# Patient Record
Sex: FEMALE | Race: WHITE | NOT HISPANIC OR LATINO | Employment: FULL TIME | URBAN - METROPOLITAN AREA
[De-identification: names, ages, dates, MRNs, and addresses within clinical notes are randomized per-mention and may not be internally consistent; named-entity substitution may affect disease eponyms.]

---

## 2017-04-06 ENCOUNTER — PATIENT MESSAGE (OUTPATIENT)
Dept: NEUROLOGY | Facility: CLINIC | Age: 52
End: 2017-04-06

## 2017-04-07 RX ORDER — RASAGILINE 1 MG/1
1 TABLET ORAL DAILY
Qty: 90 TABLET | Refills: 3 | Status: SHIPPED | OUTPATIENT
Start: 2017-04-07 | End: 2017-04-10 | Stop reason: SDUPTHER

## 2017-04-07 NOTE — TELEPHONE ENCOUNTER
Patient returned  Call and said the pharmacy needs the Rx renewed before it can be refilled. i informed patient that staff would reorder Rx so that it may be signed. Patient verbally expressed understanding.

## 2017-04-10 ENCOUNTER — PATIENT MESSAGE (OUTPATIENT)
Dept: NEUROLOGY | Facility: CLINIC | Age: 52
End: 2017-04-10

## 2017-04-11 RX ORDER — RASAGILINE 1 MG/1
1 TABLET ORAL DAILY
Qty: 90 TABLET | Refills: 3 | Status: SHIPPED | OUTPATIENT
Start: 2017-04-11 | End: 2018-04-05 | Stop reason: SDUPTHER

## 2017-07-03 ENCOUNTER — TELEPHONE (OUTPATIENT)
Dept: NEUROLOGY | Facility: CLINIC | Age: 52
End: 2017-07-03

## 2017-07-03 NOTE — TELEPHONE ENCOUNTER
----- Message from Kim Hewitt sent at 7/3/2017 10:36 AM CDT -----  Contact: Patient [phone]  Patient is calling to schedule a follow up appt with Dr. Padgett or his assistant. Please call

## 2017-07-03 NOTE — TELEPHONE ENCOUNTER
Called and left a message for Ms. Navarro regarding an appt with . I stated that I scheduled her for the next spot in August which is 29 at 4:00PM

## 2017-07-13 DIAGNOSIS — G20.A1 PARKINSON DISEASE: ICD-10-CM

## 2017-07-13 RX ORDER — LEVODOPA AND CARBIDOPA 145; 36.25 MG/1; MG/1
CAPSULE, EXTENDED RELEASE ORAL
Qty: 270 CAPSULE | Refills: 1 | Status: SHIPPED | OUTPATIENT
Start: 2017-07-13 | End: 2021-08-20

## 2017-07-14 ENCOUNTER — PATIENT MESSAGE (OUTPATIENT)
Dept: NEUROLOGY | Facility: CLINIC | Age: 52
End: 2017-07-14

## 2017-07-24 ENCOUNTER — PATIENT MESSAGE (OUTPATIENT)
Dept: NEUROLOGY | Facility: CLINIC | Age: 52
End: 2017-07-24

## 2017-08-29 ENCOUNTER — OFFICE VISIT (OUTPATIENT)
Dept: NEUROLOGY | Facility: CLINIC | Age: 52
End: 2017-08-29
Payer: COMMERCIAL

## 2017-08-29 VITALS
DIASTOLIC BLOOD PRESSURE: 73 MMHG | BODY MASS INDEX: 21.18 KG/M2 | SYSTOLIC BLOOD PRESSURE: 117 MMHG | HEART RATE: 72 BPM | HEIGHT: 66 IN | WEIGHT: 131.81 LBS

## 2017-08-29 DIAGNOSIS — G20.A1 PD (PARKINSON'S DISEASE): Primary | Chronic | ICD-10-CM

## 2017-08-29 PROCEDURE — 99999 PR PBB SHADOW E&M-EST. PATIENT-LVL III: CPT | Mod: PBBFAC,,, | Performed by: PSYCHIATRY & NEUROLOGY

## 2017-08-29 PROCEDURE — 99215 OFFICE O/P EST HI 40 MIN: CPT | Mod: S$GLB,,, | Performed by: PSYCHIATRY & NEUROLOGY

## 2017-08-29 PROCEDURE — 3008F BODY MASS INDEX DOCD: CPT | Mod: S$GLB,,, | Performed by: PSYCHIATRY & NEUROLOGY

## 2017-08-29 NOTE — LETTER
September 5, 2017      Luis Manuel Freire MD  [address] [place] [address]           [facility]  [address]  [place] [address]  Phone: [phone]  Fax: [phone]          Patient: Tika Navarro   MR Number: [id number]   YOB: 1965   Date of Visit: 8/29/2017       Dear Dr. Luis Manuel Freire:    Thank you for referring Tika Navarro to me for evaluation. Attached you will find relevant portions of my assessment and plan of care.    If you have questions, please do not hesitate to call me. I look forward to following Tika Navarro along with you.    Sincerely,    Jorgito Davey  CC:  No Recipients    If you would like to receive this communication electronically, please contact [email or web address] or [phone] to request more information on [employer] Link access.    For providers and/or their staff who would like to refer a patient to [facility], please contact us through our one-stop-shop provider referral line, [facility], at [phone].    If you feel you have received this communication in error or would no longer like to receive these types of communications, please e-mail [email or web address]

## 2017-08-29 NOTE — PROGRESS NOTES
Name: Tika Navarro  MRN: [id number]   CSN: [id number]      Date: 08/29/2017    Referring physician:  Luis Manuel Freire MD  [address] [place] [address]    Chief Complaint:   - taking rytary 145 1 tab tid plus adds in cd/ld 25/100 1/4 tabs as needed  - doing well overall  - feels gait si a bit crooked  - still fairly brittle with dyskinesia  - declines dbs or duopa  - might be good candidate for neuroderm patch study  - dx with jose schuster of hand      From 6/2016 with Alisia:  Dyskinesias are not bad until she gets anxious. Mainly notes in the RUE/ RLE.  Tends to note dyskinesias in the evening around 7 to 7:30 PM, which annoys her as she is home and eating supper and wants to relax. She mentions that if could choose to have tremor or dyskinesias, she'd choose tremor.     She takes Rytary at 7:30- 12:30- 5:30. She uses 1/4 tab of cd/ld PRN tremor, stiffness, or slowness. On a bad day, she will use 1/4 tab up to TID. She has days where she does not require any extra doses.      Impulsive tendencies:  Eating, sex, gambling, and spending.    History of Present Illness (HPI):  46 yo patient with history of YOPD, previously seen by my colleague Dr. Burris, last visit one year ago.    Patient describes initial symptoms of R hand tremor, progressing with mariela and CW over the past 5-7 years.  Had ICD with MPX - now not taking.  Poorly compliant with CD/LD 25/100 and has tendency to use as needed.    Wants more stability and regular follow-up.    Nonmotor/Premotor symptoms:  Hyposmia? Yes  RBD/sleep issues? Yes  Depression/anxiety? No  Constipation? No  Urinary issues? No  Sexual dysfunction? No  Orthostasis? No  Falls? No  Cognitive impairment? No  Psychoses? No  Pain? No    Per Dr. Burris's notes: 47 yo female with PD.  She takes Mirapex ER 0.75mg daily which has been titrated down due to her obsessive gambling problem. She was started on Sinemet 25/100 that she is taking 1 tablet twice daily. She is tolerating it well  without major side effects, but does not think it is helpful at all for right hand tremor. She was previously on Artane with major improvement in symptoms but had to stop that due to forgetfulness and stuttering problems.  Currently she states that her symptoms are worse since March when she stopped Artane. She is having trouble with writing and balance.  Her sleep is fine and has not had any falls. Her gambling problem is improved according to her, but still compulsive 1/week.     ROS:  Review of Systems   Constitutional: Negative for malaise/fatigue and weight loss.   HENT: Negative for hearing loss.    Eyes: Negative for blurred vision and double vision.   Respiratory: Negative for shortness of breath and stridor.    Cardiovascular: Negative for chest pain and palpitations.   Gastrointestinal: Negative for constipation, nausea and vomiting.   Genitourinary: Negative for frequency and urgency.   Musculoskeletal: Negative for falls and myalgias.   Skin: Negative for rash.   Neurological: Positive for tremors. Negative for focal weakness and seizures.   Endo/Heme/Allergies: Does not bruise/bleed easily.   Psychiatric/Behavioral: Negative for depression, hallucinations and memory loss. The patient is not nervous/anxious.        Past Medical History: The patient  has a past medical history of Anxiety; Hypertension; Mitral valve prolapse syndrome; Other chronic sinusitis; Parkinson disease (2004); and PONV (postoperative nausea and vomiting).    Social History: The patient  reports that she has never smoked. She does not have any smokeless tobacco history on file. She reports that she drinks about 0.6 oz of alcohol per week .    Family History: Their family history includes Coronary artery disease in her father; Diabetes in her father and mother; Hypertension in her mother; Neuropathy in her mother.    Allergies: No known drug allergies     Meds:   Current Outpatient Prescriptions on File Prior to Visit   Medication  "Sig Dispense Refill    alprazolam (XANAX XR) 0.5 MG Tb24 Take by mouth once daily.      amantadine HCl (SYMMETREL) 50 mg/5 mL Soln Take 5 ml in morning and repeat dose if needed between 3 PM  and 4 PM. 1 Bottle 5    carbidopa-levodopa  mg (SINEMET)  mg per tablet Take 0.5 tablets by mouth 4 (four) times daily. 180 tablet 5    nebivolol (BYSTOLIC) 2.5 MG tablet Take 1 tablet by mouth Daily.      paroxetine (PAXIL) 10 MG tablet Take 20 mg by mouth Twice daily.       rasagiline (AZILECT) 1 mg Tab Take 1 tablet (1 mg total) by mouth once daily. 90 tablet 3    RYTARY 36. mg CpSR Take 1 capsule by mouth 3 (three) times daily. 12 capsule 0    RYTARY 36. mg CpSR Take 1 capsule by mouth 3 (three) times daily. 270 capsule 3    RYTARY 36. mg CpSR TAKE 1 CAPSULE 3 TIMES A    capsule 1    benztropine (COGENTIN) 0.5 MG tablet Take 0.5-1 tablets (0.25-0.5 mg total) by mouth 2 (two) times daily. 60 tablet 11     No current facility-administered medications on file prior to visit.      Exam:  /73   Pulse 72   Ht 5' 6" (1.676 m)   Wt 59.8 kg (131 lb 13.4 oz)   LMP 12/28/2011   BMI 21.28 kg/m²     OFF EXAM  * Specialized movement exam  Normal speech.    Mild facial masking.   R SEVERE bradykinesia while OFF   Mod on L.    Persistent resting tremor of R hand,   Some R foot dystonia, enhanced with gait - present in 2014.   No other chorea, athetosis, myoclonus, or tics.   No motor impersistence.   Gait is normal-based and steady with good stride length      Laboratory/Radiological:  - Results:    - Independent review of images: MRI normal 2011          Diagnoses:          1) Early onset PD, more progressive dyskinesia and motor fluctuations.  On Azilect, Rytary 145s, CD/LD as needed and Amantadine 100 mg daily  2) Hip / leg length discrepancy?  Also has R sided Dequervains - seems to have general worsening or R sided symptoms 2/2 neuro-ortho.  Is it also dystonic?    Medical " Decision Making:  - Cut Rytary to 95 to avoid dyskineisa.  May be too off...which would make her an excellent candidate for the new Amantadine ER.  - Alternative would be to switch Azilect (she's not sure it is effective) for new Xadago.   - Ortho    Luis Enrique Padgett MD, MPH  Division of Movement and Memory Disorders  [facility]  [phone]

## 2017-12-14 ENCOUNTER — PATIENT MESSAGE (OUTPATIENT)
Dept: NEUROLOGY | Facility: CLINIC | Age: 52
End: 2017-12-14

## 2017-12-15 ENCOUNTER — HOSPITAL ENCOUNTER (EMERGENCY)
Facility: HOSPITAL | Age: 52
Discharge: HOME | End: 2017-12-15
Attending: EMERGENCY MEDICINE
Payer: COMMERCIAL

## 2017-12-15 VITALS
TEMPERATURE: 98 F | HEART RATE: 78 BPM | OXYGEN SATURATION: 100 % | SYSTOLIC BLOOD PRESSURE: 120 MMHG | HEIGHT: 66 IN | DIASTOLIC BLOOD PRESSURE: 57 MMHG | RESPIRATION RATE: 18 BRPM | BODY MASS INDEX: 21.21 KG/M2 | WEIGHT: 132 LBS

## 2017-12-15 DIAGNOSIS — S73.191A TEAR OF RIGHT ACETABULAR LABRUM, INITIAL ENCOUNTER: Primary | ICD-10-CM

## 2017-12-15 PROCEDURE — 99283 EMERGENCY DEPT VISIT LOW MDM: CPT

## 2017-12-15 RX ORDER — NEBIVOLOL 2.5 MG/1
2.5 TABLET ORAL DAILY
Qty: 5 TABLET | Refills: 0 | Status: SHIPPED | OUTPATIENT
Start: 2017-12-15 | End: 2023-03-15 | Stop reason: SDUPTHER

## 2017-12-15 RX ORDER — NAPROXEN AND ESOMEPRAZOLE MAGNESIUM 20; 500 MG/1; MG/1
1 TABLET, DELAYED RELEASE ORAL 2 TIMES DAILY
Qty: 30 TABLET | Refills: 0 | Status: SHIPPED | OUTPATIENT
Start: 2017-12-15 | End: 2017-12-20

## 2017-12-16 NOTE — ED PROVIDER NOTES
Encounter Date: 12/15/2017       History     Chief Complaint   Patient presents with    Hip Pain     51y F ambulatory to ED with c/o right hip pain since July, denies injury or trauma. Pt was seen by ortho on Monday. Pt recently had MRI and was diagnosed with tendon tear     Tika Navarro 51 y.o. afebrile female with HTN, MVP, chronic sinusitis, anxiety, and Parkinson's disease presented to the ED with need for evaluation of right hip pain.  She denies any trauma to the affected hip and states that the pain is present since approximately July.  He reports pain that is a constant ache and exacerbated with certain movements and ambulation.  She denies any pain radiation, back pain, numbness or tingling.  She does report that she has had to use a walker due to the pain however denies any weakness are falling.  He has tried a course of NSAIDs with little relief of symptoms.  She does report that she is seeing an orthopedic and had an MRI earlier this week but is still unclear as to the etiology of her pain.       The history is provided by the patient and the spouse.     Review of patient's allergies indicates:   Allergen Reactions    No known drug allergies      Past Medical History:   Diagnosis Date    Anxiety     Hypertension     Mitral valve prolapse syndrome     Other chronic sinusitis     Parkinson disease 2004    Right tremor type    PONV (postoperative nausea and vomiting)      Past Surgical History:   Procedure Laterality Date    BREAST SURGERY      TONSILLECTOMY, ADENOIDECTOMY       Family History   Problem Relation Age of Onset    Coronary artery disease Father     Diabetes Father     Hypertension Mother     Neuropathy Mother     Diabetes Mother     Parkinsonism Neg Hx     Breast cancer Neg Hx     Ovarian cancer Neg Hx      Social History   Substance Use Topics    Smoking status: Never Smoker    Smokeless tobacco: Not on file    Alcohol use 0.6 oz/week     1 Glasses of wine per week      Review of Systems   Constitutional: Positive for activity change. Negative for fever.   Eyes: Negative for visual disturbance.   Respiratory: Negative for chest tightness and shortness of breath.    Cardiovascular: Negative for chest pain.   Gastrointestinal: Negative for abdominal pain, nausea and vomiting.   Genitourinary: Negative for dysuria.   Musculoskeletal: Positive for arthralgias (right hip pain) and gait problem. Negative for back pain, joint swelling and myalgias.   Skin: Negative for rash and wound.   Neurological: Negative for weakness and numbness.   Hematological: Does not bruise/bleed easily.   All other systems reviewed and are negative.      Physical Exam     Initial Vitals [12/15/17 1943]   BP Pulse Resp Temp SpO2   (!) 120/57 78 18 97.9 °F (36.6 °C) 100 %      MAP       78         Physical Exam    Nursing note and vitals reviewed.  Constitutional: Vital signs are normal. She appears well-developed and well-nourished. She is not diaphoretic. She is cooperative.  Non-toxic appearance. She does not appear ill. No distress.   HENT:   Head: Normocephalic and atraumatic.   Eyes: Conjunctivae and lids are normal.   Neck: Neck supple. No neck rigidity.   Cardiovascular: Normal rate and regular rhythm.   Pulmonary/Chest: Breath sounds normal. No respiratory distress. She has no wheezes. She has no rhonchi.   Abdominal: Soft. Normal appearance. There is no tenderness. There is no rigidity and no guarding.   Musculoskeletal: Normal range of motion.   Neurological: She is alert and oriented to person, place, and time. She has normal strength. No sensory deficit. GCS eye subscore is 4. GCS verbal subscore is 5. GCS motor subscore is 6.   Skin: Skin is warm, dry and intact. No rash noted. No erythema.   Psychiatric: She has a normal mood and affect. Her speech is normal and behavior is normal. Thought content normal.         ED Course   Procedures  Labs Reviewed - No data to display     Tika P  Melanie 51 y.o. afebrile female with HTN, MVP, chronic sinusitis, anxiety, and Parkinson's disease presented to the ED with need for evaluation of right hip pain.  She denies any trauma to the affected hip and states that the pain is present since approximately July.  He reports pain that is a constant ache and exacerbated with certain movements and ambulation.  She denies any pain radiation, back pain, numbness or tingling.  She does report that she has had to use a walker due to the pain however denies any weakness are falling.  He has tried a course of NSAIDs with little relief of symptoms.  She does report that she is seeing an orthopedic and had an MRI earlier this week but is still unclear as to the etiology of her pain.  ROS positive for right hip pain.  Physical exam reveals patient well appearing in no obvious distress although she does walk with a mild shuffling and antalgic gait.  Full range of motion of all extremities including right lower extremity and hip.  No pain on passive range of motion.  No bony deformity, bony tenderness or laxity noted.  Neurovascularly intact.    DDX: fracture, sprain, dislocation, soft tissue strain    ED management: No imaging warranted at this time as patient does present with MRI that was completed this week revealing labrum tear at the right hip with no new trauma since this time.  This is likely the etiology of her chronic right hip pain. Instructed patient on RICE, that she should continue with NSAID therapy.  She was instructed to follow-up with orthopedic for their evaluation and possible steroid injection.  She is also requesting a refill on her blood pressure medication as she has few left.     Impression/Plan: The encounter diagnosis was Tear of right acetabular labrum, initial encounter. Discharged with Vimovo and bystolic. Patient will follow up with Primary or orthopedic.  Patient cautioned on when to return to ED.  Pt. Understands and agrees with current  treatment plan                      Attending Attestation:     Physician Attestation Statement for NP/PA:   I discussed this assessment and plan of this patient with the NP/PA, but I did not personally examine the patient. The face to face encounter was performed by the NP/PA.                  ED Course      Clinical Impression:   The encounter diagnosis was Tear of right acetabular labrum, initial encounter.    Disposition:   Disposition: Discharged  Condition: Stable                        DUANE Ernst  12/17/17 2026       Monica Mi MD  12/20/17 1020

## 2017-12-16 NOTE — ED NOTES
Patient identifiers for Tika Navarro checked and correct.  LOC: The patient is awake, alert and aware of environment with an appropriate affect, the patient is oriented x 3 and speaking appropriately.  APPEARANCE: Patient uncomfortable and in no acute distress, patient is clean and well groomed, patient's clothing are properly fastened.  SKIN: The skin is warm and dry, patient has normal skin turgor and moist mucus membranes.  MUSKULOSKELETAL: Patient moving all extremities, no obvious swelling or deformities noted.

## 2017-12-20 ENCOUNTER — OFFICE VISIT (OUTPATIENT)
Dept: PAIN MEDICINE | Facility: CLINIC | Age: 52
End: 2017-12-20
Attending: ANESTHESIOLOGY
Payer: COMMERCIAL

## 2017-12-20 VITALS
DIASTOLIC BLOOD PRESSURE: 60 MMHG | BODY MASS INDEX: 22.32 KG/M2 | RESPIRATION RATE: 16 BRPM | HEIGHT: 66 IN | WEIGHT: 138.88 LBS | SYSTOLIC BLOOD PRESSURE: 120 MMHG | HEART RATE: 74 BPM | TEMPERATURE: 97 F

## 2017-12-20 DIAGNOSIS — S73.191A TEAR OF RIGHT ACETABULAR LABRUM, INITIAL ENCOUNTER: ICD-10-CM

## 2017-12-20 DIAGNOSIS — M25.551 RIGHT HIP PAIN: Primary | ICD-10-CM

## 2017-12-20 PROCEDURE — 99999 PR PBB SHADOW E&M-EST. PATIENT-LVL IV: CPT | Mod: PBBFAC,,, | Performed by: ANESTHESIOLOGY

## 2017-12-20 PROCEDURE — 99203 OFFICE O/P NEW LOW 30 MIN: CPT | Mod: 25,S$GLB,, | Performed by: ANESTHESIOLOGY

## 2017-12-20 PROCEDURE — 20611 DRAIN/INJ JOINT/BURSA W/US: CPT | Mod: RT,S$GLB,, | Performed by: ANESTHESIOLOGY

## 2017-12-20 RX ORDER — METHYLPREDNISOLONE ACETATE 40 MG/ML
40 INJECTION, SUSPENSION INTRA-ARTICULAR; INTRALESIONAL; INTRAMUSCULAR; SOFT TISSUE
Status: COMPLETED | OUTPATIENT
Start: 2017-12-20 | End: 2017-12-20

## 2017-12-20 RX ORDER — ETODOLAC 400 MG/1
400 TABLET, FILM COATED ORAL 2 TIMES DAILY PRN
Qty: 30 TABLET | Refills: 3 | Status: SHIPPED | OUTPATIENT
Start: 2017-12-20 | End: 2018-02-18

## 2017-12-20 RX ORDER — PAROXETINE HYDROCHLORIDE 20 MG/1
TABLET, FILM COATED ORAL
COMMUNITY
Start: 2017-11-25 | End: 2021-08-20

## 2017-12-20 RX ORDER — BUPIVACAINE HYDROCHLORIDE 5 MG/ML
6 INJECTION, SOLUTION EPIDURAL; INTRACAUDAL
Status: COMPLETED | OUTPATIENT
Start: 2017-12-20 | End: 2017-12-20

## 2017-12-20 RX ADMIN — METHYLPREDNISOLONE ACETATE 40 MG: 40 INJECTION, SUSPENSION INTRA-ARTICULAR; INTRALESIONAL; INTRAMUSCULAR; SOFT TISSUE at 10:12

## 2017-12-20 RX ADMIN — BUPIVACAINE HYDROCHLORIDE 30 MG: 5 INJECTION, SOLUTION EPIDURAL; INTRACAUDAL at 10:12

## 2017-12-20 NOTE — LETTER
December 20, 2017      Tirso Lovett MD  [address]  [place] [address]           [facility]  [address]  [place] [address]  Phone: [phone]  Fax: [phone]          Patient: Tika Navarro   MR Number: [id number]   YOB: 1965   Date of Visit: 12/20/2017       Dear Dr. Tirso Lovett:    Thank you for referring Tika Navarro to me for evaluation. Attached you will find relevant portions of my assessment and plan of care.    If you have questions, please do not hesitate to call me. I look forward to following Tika Navarro along with you.    Sincerely,    Juanita Dominguez MD    Enclosure  CC:  No Recipients    If you would like to receive this communication electronically, please contact [email or web address] or [phone] to request more information on [employer] Link access.    For providers and/or their staff who would like to refer a patient to [facility], please contact us through our one-stop-shop provider referral line, [facility], at [phone].    If you feel you have received this communication in error or would no longer like to receive these types of communications, please e-mail [email or web address]

## 2017-12-20 NOTE — PATIENT INSTRUCTIONS
Recommend taking Etodolac 400 mg up to twice daily as needed.  Take medication with meals.  If you experience any upset stomach, nausea, or vomiting, stop taking this medication.    Recommend using Tylenol (acetaminophen) 1000 mg every 8 hours as needed for pain.  Do not take this with any other medications containing acetaminophen.  Do not exceed 3000 mg of acetaminophen in 24 hours.

## 2017-12-20 NOTE — PROGRESS NOTES
Subjective:     Patient ID: Tika Navarro is a 51 y.o. female.    Chief Complaint: Pain    Consulted by: Bony     Disclaimer: This note was generated using voice recognition software.  There may be a typographical errors that were missed during proofreading.      HPI:    Tika Navarro is a 51 y.o. female who presents today with R hip pain that has been ongoing for past 10 months.   This pain is described in detail below.    Patient reports that her hip pain started roughly 10 months ago that is described as a stabbing pain.  The pain started insidiously and has been getting progressively worse. The pain is worse with walking, standing up, and bending over (anything that puts pressure on her hip). The only thing that tends to alleviate the pain is laying down with her leg up.     Physical Therapy: No    Non-pharmacologic Treatment:     · Ice/Heat: cold packs help mildly  · TENS: no   · Massage: never  · Chiropractic care: never  · Acupuncture: never         Pain Medications:         · Currently taking: tylenol 500mg roughly 6x times daily, aleve 200mg BID    · Has tried in the past:      · Has not tried: Opioids, Muscle relaxants, TCAs, SNRIs, anticonvulsants, topical creams    Blood thinners: none    Interventional Therapies: never    Relevant Surgeries: none    Affecting sleep? yes    Affecting daily activities? yes    Depressive symptoms? no          · SI/HI? No    Work status: employed    Prescription Monitoring Program database:  Not applicable    Pain Scales  Best: 10/10  Worst: 10/10  Usually: 10/10  Today: 10/10        Last 3 PDI Scores 12/20/2017   Pain Disability Index (PDI) 55       Review of Systems   Constitution: Negative for chills, fever, malaise/fatigue, weight gain and weight loss.   HENT: Negative for ear pain and hoarse voice.    Eyes: Negative for blurred vision, pain and visual disturbance.   Cardiovascular: Negative for chest pain, dyspnea on exertion and irregular heartbeat.    Respiratory: Negative for cough, shortness of breath and wheezing.    Endocrine: Negative for cold intolerance and heat intolerance.   Hematologic/Lymphatic: Negative for adenopathy and bleeding problem. Bruises/bleeds easily.   Skin: Negative for color change, itching and rash.   Musculoskeletal: Positive for falls, joint pain and stiffness. Negative for back pain and neck pain.   Gastrointestinal: Negative for change in bowel habit, diarrhea, hematemesis, hematochezia, melena and vomiting.   Genitourinary: Negative for flank pain, frequency, hematuria and urgency.   Neurological: Positive for difficulty with concentration and loss of balance. Negative for dizziness, headaches and seizures.   Psychiatric/Behavioral: Negative for altered mental status, depression and suicidal ideas. The patient is not nervous/anxious.    Allergic/Immunologic: Negative for HIV exposure.             Past Medical History:   Diagnosis Date    Anxiety     Hypertension     Mitral valve prolapse syndrome     Other chronic sinusitis     Parkinson disease 2004    Right tremor type    PONV (postoperative nausea and vomiting)        Past Surgical History:   Procedure Laterality Date    BREAST SURGERY      TONSILLECTOMY, ADENOIDECTOMY         Review of patient's allergies indicates:   Allergen Reactions    No known drug allergies        Current Outpatient Prescriptions   Medication Sig Dispense Refill    alprazolam (XANAX XR) 0.5 MG Tb24 Take by mouth once daily.      amantadine HCl (SYMMETREL) 50 mg/5 mL Soln Take 5 ml in morning and repeat dose if needed between 3 PM  and 4 PM. 1 Bottle 5    benztropine (COGENTIN) 0.5 MG tablet Take 0.5-1 tablets (0.25-0.5 mg total) by mouth 2 (two) times daily. 60 tablet 11    carbidopa-levodopa (RYTARY) 23.75-95 mg CpSR Take 1 capsule by mouth 3 (three) times daily. 270 capsule 3    carbidopa-levodopa  mg (SINEMET)  mg per tablet Take 0.5 tablets by mouth 4 (four) times daily.  "180 tablet 5    naproxen-esomeprazole (VIMOVO) 500-20 mg TbID Take 1 tablet by mouth 2 (two) times daily. 30 tablet 0    nebivolol (BYSTOLIC) 2.5 MG Tab Take 1 tablet (2.5 mg total) by mouth once daily. 5 tablet 0    paroxetine (PAXIL) 10 MG tablet Take 20 mg by mouth Twice daily.       rasagiline (AZILECT) 1 mg Tab Take 1 tablet (1 mg total) by mouth once daily. 90 tablet 3    RYTARY 36. mg CpSR Take 1 capsule by mouth 3 (three) times daily. 12 capsule 0    RYTARY 36. mg CpSR Take 1 capsule by mouth 3 (three) times daily. 270 capsule 3    RYTARY 36. mg CpSR TAKE 1 CAPSULE 3 TIMES A    capsule 1     No current facility-administered medications for this visit.        Family History   Problem Relation Age of Onset    Coronary artery disease Father     Diabetes Father     Hypertension Mother     Neuropathy Mother     Diabetes Mother     Parkinsonism Neg Hx     Breast cancer Neg Hx     Ovarian cancer Neg Hx        Social History     Social History    Marital status:      Spouse name: N/A    Number of children: N/A    Years of education: N/A     Occupational History    Not on file.     Social History Main Topics    Smoking status: Never Smoker    Smokeless tobacco: Not on file    Alcohol use 0.6 oz/week     1 Glasses of wine per week    Drug use: Unknown    Sexual activity: Not on file     Other Topics Concern    Not on file     Social History Narrative    Lives with boyfriend       Objective:     Vitals:    12/20/17 1045 12/20/17 1052   BP: 120/60    Pulse: 74    Resp: 16    Temp: 97.4 °F (36.3 °C)    TempSrc: Oral    Weight: 63 kg (138 lb 14.2 oz)    Height: 5' 6" (1.676 m)    PainSc: 10-Worst pain ever 10-Worst pain ever   PainLoc: Hip        GEN:  Well developed, well nourished.  No acute distress.   HEENT:  No trauma.  Mucous membranes moist.  Nares patent bilaterally.  PSYCH: Normal affect. Thought content appropriate.  CHEST:  Breathing symmetric.  No " audible wheezing.  ABD: Soft, non-tender, non-distended.  SKIN:  Warm, pink, dry.  No rash on exposed areas.    EXT:  No cyanosis, clubbing, or edema.  No color change or changes in nail or hair growth.  NEURO/MUSCULOSKELETAL:  Fully alert, oriented, and appropriate. Speech normal shun. No cranial nerve deficits.   Gait: Antalgic gait present  Motor Strength: 5/5 motor strength throughout lower extremities.   Sensory: No sensory deficit in the lower extremities.   SI Joint/Hip: Negative JADA bilaterally.  Negative FADIR bilaterally. Hip flexion mildly limited 2/2 to pain, otherwise normal ROM   Extreme pain with weight bearing on right hip.      Imaging:        The imaging studies listed below were independently reviewed by me, and I agree with the findings as documented below.     Outside MRI report and images show a labral tear in her right hip.  Report scanned into Epic.    Assessment:     Encounter Diagnoses   Name Primary?    Right hip pain Yes    Tear of right acetabular labrum, initial encounter        Plan:     Tika DECKER was seen today for hip pain.    Diagnoses and all orders for this visit:    Right hip pain  -     etodolac (LODINE) 400 MG tablet; Take 1 tablet (400 mg total) by mouth 2 (two) times daily as needed (pain).  -     Ambulatory consult to Physical Therapy  -     bupivacaine (PF) 0.5% (5 mg/ml) injection 30 mg; Inject 6 mLs (30 mg total) into the articular space one time.  -     methylPREDNISolone acetate injection 40 mg; Inject 1 mL (40 mg total) into the articular space one time.    Tear of right acetabular labrum, initial encounter  -     etodolac (LODINE) 400 MG tablet; Take 1 tablet (400 mg total) by mouth 2 (two) times daily as needed (pain).  -     Ambulatory consult to Physical Therapy         R hip pain is consistent with the above.    We discussed the assessment and recommendations.  All available images were reviewed. We discussed the disease process, prognosis, treatment  plan, and risks and benefits. The patient is aware of the risks and benefits of the medications being prescribed, common side effects, and proper usage. The following is the plan we agreed on:     1. R hip injection performed in clinic today as below, can repeat x 1 if needed  2. Referral to PT  3. Prescription for etodolac provided to be used PRN if pain returns following injection.  4. Recommend using Tylenol 1000 mg every 8 hours as needed for pain.  Do not take this with any other medications containing acetaminophen.  Do not exceed 3000 mg of acetaminophen in 24 hours.  5. Consider referral to [facility] if limited benefit from 1-4  6. RTC in 3-6 weeks or sooner if needed.      Date of Procedure: 12/20/2017    Procedure: Right hip intra-articular injection using ultrasound guidance    Pre-op diagnosis: Right hip pain    Post-op diagnosis: Right hip pain    Physician: Dr. Juanita Dominguez     Assistant: Dr. Gordon    Anesthestia: Local    EBL: None    Specimens: None    Ultrasound-guided Right hip intra-articular injection:   Consent for the procedure was obtained after the procedure was fully explained to the patient.     All medications, allergies, and relevant histories were reviewed. No recent antibiotics or infections.  A time-out was taken to verify the correct patient, procedure, laterality, and appropriate medications/allergies.    Patient was placed in the supine position with right hip slightly externally rotated. Area was prepped with chlorhexidine. Sterile precautions observed throughout the procedure. Xylocaine 1% was infiltrated locally over the entry site over the hip joint on the left side. A 21-gauge B bevel needle was introduced in the hip joint space under US guidance. A 7 mL solution of 0.5% Bupivacaine with Betamethasone 6 mg was injected after repeated negative aspirations.  US guidance showed good spread of local anesthesia. Needle was removed and a dressing was applied.  Patient  tolerated the procedure well without any complications.      Patient tolerated the procedure well without any complications    Thank you for allowing me to participate in the care of this patient.   Please do not hesitate to call me at [phone] with any questions or concerns.    I have seen the patient with the resident physician.  I have performed my own history and physical exam and we have come up with the above plan.  The patient is in agreement with our plan.       The above plan and management options were discussed at length with patient. Patient is in agreement with the above and verbalized understanding. It will be communicated with the referring physician via electronic record, fax, or mail.

## 2017-12-21 ENCOUNTER — PATIENT MESSAGE (OUTPATIENT)
Dept: PAIN MEDICINE | Facility: CLINIC | Age: 52
End: 2017-12-21

## 2017-12-26 NOTE — TELEPHONE ENCOUNTER
Please review patient request. She would like to know what her next step in treatment will be.     She was informed that provider does not return after the 1st of the year.     1. Treatment plan: R hip injection performed in clinic today as below, can repeat x 1 if needed  2. Referral to PT  3. Prescription for etodolac provided to be used PRN if pain returns following injection.  4. Recommend using Tylenol 1000 mg every 8 hours as needed for pain.  Do not take this with any other medications containing acetaminophen.  Do not exceed 3000 mg of acetaminophen in 24 hours.  5. Consider referral to [facility] if limited benefit from 1-4  RTC in 3-6 weeks or sooner if needed.

## 2017-12-28 ENCOUNTER — OFFICE VISIT (OUTPATIENT)
Dept: PAIN MEDICINE | Facility: CLINIC | Age: 52
End: 2017-12-28
Payer: COMMERCIAL

## 2017-12-28 VITALS — WEIGHT: 138 LBS | RESPIRATION RATE: 16 BRPM | TEMPERATURE: 97 F | BODY MASS INDEX: 22.18 KG/M2 | HEIGHT: 66 IN

## 2017-12-28 DIAGNOSIS — M70.61 GREATER TROCHANTERIC BURSITIS OF RIGHT HIP: ICD-10-CM

## 2017-12-28 DIAGNOSIS — M25.551 RIGHT HIP PAIN: ICD-10-CM

## 2017-12-28 DIAGNOSIS — S73.191D TEAR OF RIGHT ACETABULAR LABRUM, SUBSEQUENT ENCOUNTER: Primary | ICD-10-CM

## 2017-12-28 PROCEDURE — 99213 OFFICE O/P EST LOW 20 MIN: CPT | Mod: S$GLB,,, | Performed by: NURSE PRACTITIONER

## 2017-12-28 PROCEDURE — 99999 PR PBB SHADOW E&M-EST. PATIENT-LVL III: CPT | Mod: PBBFAC,,, | Performed by: NURSE PRACTITIONER

## 2017-12-28 RX ORDER — IBUPROFEN AND FAMOTIDINE 26.6; 8 MG/1; MG/1
1 TABLET ORAL 3 TIMES DAILY
Qty: 90 TABLET | Refills: 1 | Status: SHIPPED | OUTPATIENT
Start: 2017-12-28 | End: 2018-01-27

## 2017-12-28 RX ORDER — IBUPROFEN AND FAMOTIDINE 26.6; 8 MG/1; MG/1
1 TABLET ORAL 3 TIMES DAILY
Qty: 90 TABLET | Refills: 1 | Status: SHIPPED | OUTPATIENT
Start: 2017-12-28 | End: 2017-12-28 | Stop reason: SDUPTHER

## 2017-12-28 NOTE — PROGRESS NOTES
Subjective:     Patient ID: Tika Navarro is a 52 y.o. female.    Chief Complaint: Pain    Consulted by: Bony     Disclaimer: This note was generated using voice recognition software.  There may be a typographical errors that were missed during proofreading.      HPI:    Tika Navarro is a 52 y.o. female who presents today with R hip pain that has been ongoing for past 10 months.   This pain is described in detail below.    Patient reports that her hip pain started roughly 10 months ago that is described as a stabbing pain.  The pain started insidiously and has been getting progressively worse. The pain is worse with walking, standing up, and bending over (anything that puts pressure on her hip). The only thing that tends to alleviate the pain is laying down with her leg up.     Interval History (12/28/2017):  The patient returns to clinic today for follow up. She is s/p right hip joint injection on 12/20/2017. She reports no significant relief of her pain. She reports increased right hip pain since the procedure. She describes the pain as constant, sharp, and stabbing in nature. She also reports increased pain on the outer lateral side of her right hip with pain that radiates into her groin. She reports that her pain is worse with walking and standing. She reports difficulty ambulating due to her pain. She is currently taking Etodolac with limited relief. She has also tried Vimovo in the past with limited relief. She denies any other health changes. She denies any bowel or bladder incontinence.     Physical Therapy: No    Non-pharmacologic Treatment:     · Ice/Heat: cold packs help mildly  · TENS: no   · Massage: never  · Chiropractic care: never  · Acupuncture: never         Pain Medications:         · Currently taking: tylenol 500mg roughly 6x times daily, Etodolac    · Has tried in the past:      · Has not tried: Opioids, Muscle relaxants, TCAs, SNRIs, anticonvulsants, topical creams    Blood  thinners: none    Interventional Therapies: never    Relevant Surgeries: none    Affecting sleep? yes    Affecting daily activities? yes    Depressive symptoms? no          · SI/HI? No    Work status: employed    Prescription Monitoring Program database:  Not applicable    Pain Scales  Best: 10/10  Worst: 10/10  Usually: 10/10  Today: 10/10    Hip Pain    Associated symptoms include stiffness. Pertinent negatives include no fever or itching.       Last 3 PDI Scores 12/28/2017 12/20/2017   Pain Disability Index (PDI) 61 55       Review of Systems   Constitution: Negative for chills, fever, malaise/fatigue, weight gain and weight loss.   HENT: Negative for ear pain and hoarse voice.    Eyes: Negative for blurred vision, pain and visual disturbance.   Cardiovascular: Negative for chest pain, dyspnea on exertion and irregular heartbeat.   Respiratory: Negative for cough, shortness of breath and wheezing.    Endocrine: Negative for cold intolerance and heat intolerance.   Hematologic/Lymphatic: Negative for adenopathy and bleeding problem. Bruises/bleeds easily.   Skin: Negative for color change, itching and rash.   Musculoskeletal: Positive for falls, joint pain and stiffness. Negative for back pain and neck pain.   Gastrointestinal: Negative for change in bowel habit, diarrhea, hematemesis, hematochezia, melena and vomiting.   Genitourinary: Negative for flank pain, frequency, hematuria and urgency.   Neurological: Positive for difficulty with concentration and loss of balance. Negative for dizziness, headaches and seizures.   Psychiatric/Behavioral: Negative for altered mental status, depression and suicidal ideas. The patient is not nervous/anxious.    Allergic/Immunologic: Negative for HIV exposure.             Past Medical History:   Diagnosis Date    Anxiety     Hypertension     Mitral valve prolapse syndrome     Other chronic sinusitis     Parkinson disease 2004    Right tremor type    PONV (postoperative  nausea and vomiting)        Past Surgical History:   Procedure Laterality Date    BREAST SURGERY      TONSILLECTOMY, ADENOIDECTOMY         Review of patient's allergies indicates:   Allergen Reactions    No known drug allergies        Current Outpatient Prescriptions   Medication Sig Dispense Refill    alprazolam (XANAX XR) 0.5 MG Tb24 Take by mouth once daily.      amantadine HCl (SYMMETREL) 50 mg/5 mL Soln Take 5 ml in morning and repeat dose if needed between 3 PM  and 4 PM. 1 Bottle 5    carbidopa-levodopa (RYTARY) 23.75-95 mg CpSR Take 1 capsule by mouth 3 (three) times daily. 270 capsule 3    carbidopa-levodopa  mg (SINEMET)  mg per tablet Take 0.5 tablets by mouth 4 (four) times daily. 180 tablet 5    etodolac (LODINE) 400 MG tablet Take 1 tablet (400 mg total) by mouth 2 (two) times daily as needed (pain). 30 tablet 3    nebivolol (BYSTOLIC) 2.5 MG Tab Take 1 tablet (2.5 mg total) by mouth once daily. 5 tablet 0    paroxetine (PAXIL) 10 MG tablet Take 20 mg by mouth Twice daily.       paroxetine (PAXIL) 20 MG tablet       rasagiline (AZILECT) 1 mg Tab Take 1 tablet (1 mg total) by mouth once daily. 90 tablet 3    RYTARY 36. mg CpSR Take 1 capsule by mouth 3 (three) times daily. 12 capsule 0    RYTARY 36. mg CpSR Take 1 capsule by mouth 3 (three) times daily. 270 capsule 3    RYTARY 36. mg CpSR TAKE 1 CAPSULE 3 TIMES A    capsule 1    benztropine (COGENTIN) 0.5 MG tablet Take 0.5-1 tablets (0.25-0.5 mg total) by mouth 2 (two) times daily. 60 tablet 11     No current facility-administered medications for this visit.        Family History   Problem Relation Age of Onset    Coronary artery disease Father     Diabetes Father     Hypertension Mother     Neuropathy Mother     Diabetes Mother     Parkinsonism Neg Hx     Breast cancer Neg Hx     Ovarian cancer Neg Hx        Social History     Social History    Marital status:      Spouse name: N/A  "   Number of children: N/A    Years of education: N/A     Occupational History    Not on file.     Social History Main Topics    Smoking status: Never Smoker    Smokeless tobacco: Not on file    Alcohol use 0.6 oz/week     1 Glasses of wine per week    Drug use: Unknown    Sexual activity: Not on file     Other Topics Concern    Not on file     Social History Narrative    Lives with boyfriend       Objective:     Vitals:    12/28/17 1517 12/28/17 1522   Resp: 16    Temp: 97.3 °F (36.3 °C)    TempSrc: Oral    Weight: 62.6 kg (138 lb)    Height: 5' 6" (1.676 m)    PainSc: 10-Worst pain ever 10-Worst pain ever   PainLoc: Hip        GEN:  Well developed, well nourished.  No acute distress.   HEENT:  No trauma.  Mucous membranes moist.  Nares patent bilaterally.  PSYCH: Normal affect. Thought content appropriate.  CHEST:  Breathing symmetric.  No audible wheezing.  ABD: Soft, non-tender, non-distended.  SKIN:  Warm, pink, dry.  No rash on exposed areas.    EXT:  No cyanosis, clubbing, or edema.  No color change or changes in nail or hair growth.  NEURO/MUSCULOSKELETAL:  Fully alert, oriented, and appropriate. Speech normal shun. No cranial nerve deficits.   Gait: Antalgic- ambulates with wheelchair.   Motor Strength: 5/5 motor strength throughout lower extremities.   Sensory: No sensory deficit in the lower extremities.   SI Joint/Hip: Negative JADA bilaterally.  Negative FADIR bilaterally. Normal ROM with pain on external rotation.   Extreme pain with weight bearing on right hip.  TTP over right GT bursa.     Imaging:        The imaging studies listed below were independently reviewed by me, and I agree with the findings as documented below.     Outside MRI report and images show a labral tear in her right hip.  Report scanned into Epic.    Assessment:     Encounter Diagnoses   Name Primary?    Tear of right acetabular labrum, subsequent encounter Yes    Greater trochanteric bursitis of right hip     " Right hip pain        Plan:     Tika DECKER was seen today for hip pain.    Diagnoses and all orders for this visit:    Tear of right acetabular labrum, subsequent encounter  -     ibuprofen-famotidine (DUEXIS) 800-26.6 mg Tab; Take 1 tablet by mouth 3 (three) times daily.  -     Ambulatory consult to Sports Medicine    Greater trochanteric bursitis of right hip  -     ibuprofen-famotidine (DUEXIS) 800-26.6 mg Tab; Take 1 tablet by mouth 3 (three) times daily.  -     Ambulatory consult to Sports Medicine    Right hip pain  -     ibuprofen-famotidine (DUEXIS) 800-26.6 mg Tab; Take 1 tablet by mouth 3 (three) times daily.  -     Ambulatory consult to Sports Medicine         R hip pain is consistent with the above.    We discussed the assessment and recommendations.  All available images were reviewed. We discussed the disease process, prognosis, treatment plan, and risks and benefits. The patient is aware of the risks and benefits of the medications being prescribed, common side effects, and proper usage. The following is the plan we agreed on:     1. She is scheduled for follow up with Dr. Dominguez next week. We may consider right GT bursa injection.   2. Trial Duexis TID PRN pain.   3. Consult Sports Medicine.   4. Continue PT.   5. Recommend using Tylenol 1000 mg every 8 hours as needed for pain.  Do not take this with any other medications containing acetaminophen.  Do not exceed 3000 mg of acetaminophen in 24 hours.  6. Consider referral to [facility] if limited benefit from 1-4  7. RTC in 3-6 weeks or sooner if needed.    The above plan and management options were discussed at length with patient. Patient is in agreement with the above and verbalized understanding.     Isabel Kidd NP  12/28/2017

## 2017-12-28 NOTE — TELEPHONE ENCOUNTER
Contacted and spoke with pt regarding sooner appointment. Pt was scheduled for same day appointment with NP, she accepted.    Pt verbalized understanding

## 2018-01-01 ENCOUNTER — PATIENT MESSAGE (OUTPATIENT)
Dept: PAIN MEDICINE | Facility: CLINIC | Age: 53
End: 2018-01-01

## 2018-01-05 ENCOUNTER — TELEPHONE (OUTPATIENT)
Dept: SPORTS MEDICINE | Facility: CLINIC | Age: 53
End: 2018-01-05

## 2018-01-05 NOTE — TELEPHONE ENCOUNTER
Called Pt to see if she'd like to reschedule her appt. And she stated she had emergency sx on 01/01/18 and no longer needed her appt.

## 2018-01-08 ENCOUNTER — PATIENT MESSAGE (OUTPATIENT)
Dept: NEUROLOGY | Facility: CLINIC | Age: 53
End: 2018-01-08

## 2018-01-09 ENCOUNTER — PATIENT MESSAGE (OUTPATIENT)
Dept: NEUROLOGY | Facility: CLINIC | Age: 53
End: 2018-01-09

## 2018-03-09 ENCOUNTER — PATIENT MESSAGE (OUTPATIENT)
Dept: NEUROLOGY | Facility: CLINIC | Age: 53
End: 2018-03-09

## 2018-03-10 ENCOUNTER — PATIENT MESSAGE (OUTPATIENT)
Dept: NEUROLOGY | Facility: CLINIC | Age: 53
End: 2018-03-10

## 2018-04-06 RX ORDER — RASAGILINE 1 MG/1
1 TABLET ORAL DAILY
Qty: 90 TABLET | Refills: 3 | Status: SHIPPED | OUTPATIENT
Start: 2018-04-06 | End: 2019-04-15 | Stop reason: SDUPTHER

## 2018-04-06 RX ORDER — RASAGILINE 1 MG/1
TABLET ORAL
Qty: 90 TABLET | Refills: 3 | Status: SHIPPED | OUTPATIENT
Start: 2018-04-06 | End: 2018-04-06 | Stop reason: SDUPTHER

## 2018-05-04 ENCOUNTER — PATIENT MESSAGE (OUTPATIENT)
Dept: NEUROLOGY | Facility: CLINIC | Age: 53
End: 2018-05-04

## 2018-05-30 ENCOUNTER — PATIENT MESSAGE (OUTPATIENT)
Dept: NEUROLOGY | Facility: CLINIC | Age: 53
End: 2018-05-30

## 2018-06-27 ENCOUNTER — OFFICE VISIT (OUTPATIENT)
Dept: NEUROLOGY | Facility: CLINIC | Age: 53
End: 2018-06-27
Payer: COMMERCIAL

## 2018-06-27 ENCOUNTER — TELEPHONE (OUTPATIENT)
Dept: NEUROLOGY | Facility: CLINIC | Age: 53
End: 2018-06-27

## 2018-06-27 VITALS
HEART RATE: 88 BPM | DIASTOLIC BLOOD PRESSURE: 68 MMHG | HEIGHT: 66 IN | BODY MASS INDEX: 21.44 KG/M2 | WEIGHT: 133.38 LBS | SYSTOLIC BLOOD PRESSURE: 115 MMHG

## 2018-06-27 DIAGNOSIS — G20.A1 PARKINSON DISEASE: Primary | ICD-10-CM

## 2018-06-27 PROCEDURE — 99214 OFFICE O/P EST MOD 30 MIN: CPT | Mod: S$GLB,,, | Performed by: PSYCHIATRY & NEUROLOGY

## 2018-06-27 PROCEDURE — 99999 PR PBB SHADOW E&M-EST. PATIENT-LVL III: CPT | Mod: PBBFAC,,, | Performed by: PSYCHIATRY & NEUROLOGY

## 2018-06-27 NOTE — PROGRESS NOTES
Name: Tika Navarro  MRN: [id number]   CSN: [id number]      Date: 06/27/2018      Chief Complaint:   - 3 hip surgeries, last one was fully replaced R side on May 23rd.  Had some wacky dreams in the post-op ion January, better now.  Previous plate screws in R hip not working, then they added a amelie, and failed as well with poor healing.  Then replaced the whole thing.  Feels well overall now.     - now with a bit of pain but ok  - less dyskinesia now  -adds a little IR in the am (1/4 to 1/2) to boost her on      From Aug 2017:  - taking rytary 145 1 tab tid plus adds in cd/ld 25/100 1/4 tabs as needed  - doing well overall  - feels gait si a bit crooked  - still fairly brittle with dyskinesia  - declines dbs or duopa  - might be good candidate for neuroderm patch study  - dx with jose schuster of hand      From 6/2016 with Alisia:  Dyskinesias are not bad until she gets anxious. Mainly notes in the RUE/ RLE.  Tends to note dyskinesias in the evening around 7 to 7:30 PM, which annoys her as she is home and eating supper and wants to relax. She mentions that if could choose to have tremor or dyskinesias, she'd choose tremor.     She takes Rytary at 7:30- 12:30- 5:30. She uses 1/4 tab of cd/ld PRN tremor, stiffness, or slowness. On a bad day, she will use 1/4 tab up to TID. She has days where she does not require any extra doses.      Impulsive tendencies:  Eating, sex, gambling, and spending.    History of Present Illness (HPI):  46 yo patient with history of YOPD, previously seen by my colleague Dr. Burris, last visit one year ago.    Patient describes initial symptoms of R hand tremor, progressing with mariela and CW over the past 5-7 years.  Had ICD with MPX - now not taking.  Poorly compliant with CD/LD 25/100 and has tendency to use as needed.    Wants more stability and regular follow-up.    Nonmotor/Premotor symptoms:  Hyposmia? Yes  RBD/sleep issues? Yes  Depression/anxiety? No  Constipation? No  Urinary issues?  No  Sexual dysfunction? No  Orthostasis? No  Falls? No  Cognitive impairment? No  Psychoses? No  Pain? No    Per Dr. Burris's notes: 45 yo female with PD.  She takes Mirapex ER 0.75mg daily which has been titrated down due to her obsessive gambling problem. She was started on Sinemet 25/100 that she is taking 1 tablet twice daily. She is tolerating it well without major side effects, but does not think it is helpful at all for right hand tremor. She was previously on Artane with major improvement in symptoms but had to stop that due to forgetfulness and stuttering problems.  Currently she states that her symptoms are worse since March when she stopped Artane. She is having trouble with writing and balance.  Her sleep is fine and has not had any falls. Her gambling problem is improved according to her, but still compulsive 1/week.     ROS:  Review of Systems   Constitutional: Negative for malaise/fatigue and weight loss.   HENT: Negative for hearing loss.    Eyes: Negative for blurred vision and double vision.   Respiratory: Negative for shortness of breath and stridor.    Cardiovascular: Negative for chest pain and palpitations.   Gastrointestinal: Negative for constipation, nausea and vomiting.   Genitourinary: Negative for frequency and urgency.   Musculoskeletal: Negative for falls and myalgias.   Skin: Negative for rash.   Neurological: Positive for tremors. Negative for focal weakness and seizures.   Endo/Heme/Allergies: Does not bruise/bleed easily.   Psychiatric/Behavioral: Negative for depression, hallucinations and memory loss. The patient is not nervous/anxious.        Past Medical History: The patient  has a past medical history of Anxiety; Hypertension; Mitral valve prolapse syndrome; Other chronic sinusitis; Parkinson disease (2004); and PONV (postoperative nausea and vomiting).    Social History: The patient  reports that she has never smoked. She does not have any smokeless tobacco history on file. She  "reports that she drinks about 0.6 oz of alcohol per week .    Family History: Their family history includes Coronary artery disease in her father; Diabetes in her father and mother; Hypertension in her mother; Neuropathy in her mother.    Allergies: No known drug allergies     Meds:   Current Outpatient Prescriptions on File Prior to Visit   Medication Sig Dispense Refill    alprazolam (XANAX XR) 0.5 MG Tb24 Take by mouth once daily.      amantadine HCl (SYMMETREL) 50 mg/5 mL Soln Take 5 ml in morning and repeat dose if needed between 3 PM  and 4 PM. 1 Bottle 5    carbidopa-levodopa (RYTARY) 23.75-95 mg CpSR Take 1 capsule by mouth 3 (three) times daily. 270 capsule 3    carbidopa-levodopa  mg (SINEMET)  mg per tablet Take 0.5 tablets by mouth 4 (four) times daily. 180 tablet 5    nebivolol (BYSTOLIC) 2.5 MG Tab Take 1 tablet (2.5 mg total) by mouth once daily. 5 tablet 0    paroxetine (PAXIL) 10 MG tablet Take 20 mg by mouth Twice daily.       rasagiline (AZILECT) 1 mg Tab Take 1 tablet (1 mg total) by mouth once daily. 90 tablet 3    benztropine (COGENTIN) 0.5 MG tablet Take 0.5-1 tablets (0.25-0.5 mg total) by mouth 2 (two) times daily. 60 tablet 11    paroxetine (PAXIL) 20 MG tablet       RYTARY 36. mg CpSR Take 1 capsule by mouth 3 (three) times daily. 12 capsule 0    RYTARY 36. mg CpSR Take 1 capsule by mouth 3 (three) times daily. 270 capsule 3    RYTARY 36. mg CpSR TAKE 1 CAPSULE 3 TIMES A    capsule 1     No current facility-administered medications on file prior to visit.      Exam:  /68   Pulse 88   Ht 5' 6" (1.676 m)   Wt 60.5 kg (133 lb 6.1 oz)   LMP 12/28/2011   BMI 21.53 kg/m²     OFF EXAM  * Specialized movement exam  Normal speech.    Mild facial masking.   R SEVERE bradykinesia while OFF   Mod on L.    Persistent resting tremor of R hand,   Some R foot dystonia, enhanced with gait - present in 2014.   No other chorea, athetosis, myoclonus, " or tics.   No motor impersistence.   Gait is normal-based and steady with good stride length      Laboratory/Radiological:  - Results:    - Independent review of images: MRI normal 2011    Diagnoses:          1) Early onset PD, more progressive dyskinesia and motor fluctuations.  On Azilect, Rytary 145s, CD/LD as needed and Amantadine 100 mg daily  2) Hip / leg length discrepancy?  Also has R sided Dequervains - seems to have general worsening or R sided symptoms 2/2 neuro-ortho.  Is it also dystonic?    Medical Decision Making:  - rational polypharmacy  - rytary balanced with amantadine    Luis Enrique Padgett MD, MPH  Division of Movement and Memory Disorders  [facility]  [phone]

## 2018-08-29 RX ORDER — LEVODOPA AND CARBIDOPA 95; 23.75 MG/1; MG/1
CAPSULE, EXTENDED RELEASE ORAL
Qty: 270 CAPSULE | Refills: 3 | Status: SHIPPED | OUTPATIENT
Start: 2018-08-29 | End: 2018-08-30 | Stop reason: SDUPTHER

## 2018-11-01 ENCOUNTER — PATIENT MESSAGE (OUTPATIENT)
Dept: NEUROLOGY | Facility: CLINIC | Age: 53
End: 2018-11-01

## 2018-11-01 DIAGNOSIS — G20.A1 PD (PARKINSON'S DISEASE): ICD-10-CM

## 2018-11-05 RX ORDER — CARBIDOPA AND LEVODOPA 25; 100 MG/1; MG/1
0.5 TABLET ORAL 4 TIMES DAILY
Qty: 180 TABLET | Refills: 5 | Status: SHIPPED | OUTPATIENT
Start: 2018-11-05 | End: 2021-03-18 | Stop reason: SDUPTHER

## 2018-12-31 RX ORDER — IBUPROFEN AND FAMOTIDINE 800; 26.6 MG/1; MG/1
TABLET, COATED ORAL
Qty: 90 TABLET | OUTPATIENT
Start: 2018-12-31

## 2019-02-22 ENCOUNTER — TELEPHONE (OUTPATIENT)
Dept: NEUROLOGY | Facility: CLINIC | Age: 54
End: 2019-02-22

## 2019-02-22 NOTE — TELEPHONE ENCOUNTER
----- Message from Nina Elena sent at 2/22/2019 10:06 AM CST -----  Contact: PT  Pt would like to be called back regarding getting a appt    Pt can be reached at [phone]

## 2019-04-15 RX ORDER — RASAGILINE 1 MG/1
1 TABLET ORAL DAILY
Qty: 90 TABLET | Refills: 3 | Status: SHIPPED | OUTPATIENT
Start: 2019-04-15 | End: 2019-04-17 | Stop reason: SDUPTHER

## 2019-04-16 ENCOUNTER — PATIENT MESSAGE (OUTPATIENT)
Dept: NEUROLOGY | Facility: CLINIC | Age: 54
End: 2019-04-16

## 2019-04-16 RX ORDER — RASAGILINE 1 MG/1
1 TABLET ORAL DAILY
Qty: 90 TABLET | Refills: 3 | Status: CANCELLED | OUTPATIENT
Start: 2019-04-16

## 2019-04-17 ENCOUNTER — PATIENT MESSAGE (OUTPATIENT)
Dept: NEUROLOGY | Facility: CLINIC | Age: 54
End: 2019-04-17

## 2019-04-17 RX ORDER — RASAGILINE 1 MG/1
1 TABLET ORAL DAILY
Qty: 90 TABLET | Refills: 3 | Status: SHIPPED | OUTPATIENT
Start: 2019-04-17 | End: 2020-03-31

## 2019-04-17 NOTE — TELEPHONE ENCOUNTER
----- Message from Epifanio Tyson sent at 4/17/2019 12:34 PM CDT -----  Rx Refill/Request     Is this a Refill or New Rx: refill   Rx Name and Strength: rasagiline (AZILECT) 1 mg Tab    Preferred Pharmacy with phone number: see below  Communication Preference: [phone]  Additional Information: Pt said she's out of the medication and needs this filled asap today    [id number] - [place] - [address]  [address] [place] [address]  Phone: [phone] Fax: [phone]

## 2019-04-18 ENCOUNTER — PATIENT MESSAGE (OUTPATIENT)
Dept: NEUROLOGY | Facility: CLINIC | Age: 54
End: 2019-04-18

## 2019-04-18 NOTE — TELEPHONE ENCOUNTER
----- Message from Fitz Mojica sent at 4/18/2019 11:12 AM CDT -----  Contact: Patient @ [phone]  Patient calling to get an update on the medication refill request for (rasagiline (AZILECT) 1 mg Tab ) pls forward a new script to:     [id number] - [place] - [address]  [address] [place] [address]  Phone: [phone] Fax: [phone]    PATIENT IS OUT OF MEDICATION

## 2019-06-06 ENCOUNTER — TELEPHONE (OUTPATIENT)
Dept: NEUROLOGY | Facility: CLINIC | Age: 54
End: 2019-06-06

## 2019-06-06 ENCOUNTER — PATIENT MESSAGE (OUTPATIENT)
Dept: NEUROLOGY | Facility: CLINIC | Age: 54
End: 2019-06-06

## 2019-06-06 NOTE — TELEPHONE ENCOUNTER
----- Message from Fitz Mojica sent at 6/6/2019  4:22 PM CDT -----  Contact: Patient @ [phone]  Patient calling to schedule a f/u appt, pls call

## 2019-06-24 ENCOUNTER — OFFICE VISIT (OUTPATIENT)
Dept: NEUROLOGY | Facility: CLINIC | Age: 54
End: 2019-06-24
Payer: COMMERCIAL

## 2019-06-24 VITALS
DIASTOLIC BLOOD PRESSURE: 70 MMHG | BODY MASS INDEX: 22.11 KG/M2 | SYSTOLIC BLOOD PRESSURE: 123 MMHG | HEART RATE: 87 BPM | HEIGHT: 66 IN | WEIGHT: 137.56 LBS

## 2019-06-24 DIAGNOSIS — G20.A1 PARKINSON DISEASE: Primary | ICD-10-CM

## 2019-06-24 PROCEDURE — 99999 PR PBB SHADOW E&M-EST. PATIENT-LVL III: CPT | Mod: PBBFAC,,, | Performed by: PSYCHIATRY & NEUROLOGY

## 2019-06-24 PROCEDURE — 99215 OFFICE O/P EST HI 40 MIN: CPT | Mod: S$GLB,,, | Performed by: PSYCHIATRY & NEUROLOGY

## 2019-06-24 PROCEDURE — 99215 PR OFFICE/OUTPT VISIT, EST, LEVL V, 40-54 MIN: ICD-10-PCS | Mod: S$GLB,,, | Performed by: PSYCHIATRY & NEUROLOGY

## 2019-06-24 PROCEDURE — 99999 PR PBB SHADOW E&M-EST. PATIENT-LVL III: ICD-10-PCS | Mod: PBBFAC,,, | Performed by: PSYCHIATRY & NEUROLOGY

## 2019-06-24 RX ORDER — AMANTADINE HYDROCHLORIDE 100 MG/1
100 CAPSULE, GELATIN COATED ORAL 2 TIMES DAILY
Qty: 60 CAPSULE | Refills: 11 | Status: SHIPPED | OUTPATIENT
Start: 2019-06-24 | End: 2019-08-01 | Stop reason: SDUPTHER

## 2019-06-24 NOTE — PROGRESS NOTES
"Name: Tika Navarro  MRN: [id number]   CSN: [id number]      Date: 06/24/2019      Chief Complaint:  - last seen last year  - more off time, better stronger percieved at 3 hours ("Rytary dip")  - takes 1/2 tab cd/ld at 1 hr, then she gets dyskinetic  - no new tremor  - more dy skinesia  - cog ok  - mood stable  - no new bowel or bladder issues    From May 2018:   - 3 hip surgeries, last one was fully replaced R side on May 23rd.  Had some wacky dreams in the post-op ion January, better now.  Previous plate screws in R hip not working, then they added a amelie, and failed as well with poor healing.  Then replaced the whole thing.  Feels well overall now.     - now with a bit of pain but ok  - less dyskinesia now  -adds a little IR in the am (1/4 to 1/2) to boost her on      From Aug 2017:  - taking rytary 145 1 tab tid plus adds in cd/ld 25/100 1/4 tabs as needed  - doing well overall  - feels gait si a bit crooked  - still fairly brittle with dyskinesia  - declines dbs or duopa  - might be good candidate for neuroderm patch study  - dx with jose schuster of hand      From 6/2016 with Alisia:  Dyskinesias are not bad until she gets anxious. Mainly notes in the RUE/ RLE.  Tends to note dyskinesias in the evening around 7 to 7:30 PM, which annoys her as she is home and eating supper and wants to relax. She mentions that if could choose to have tremor or dyskinesias, she'd choose tremor.     She takes Rytary at 7:30- 12:30- 5:30. She uses 1/4 tab of cd/ld PRN tremor, stiffness, or slowness. On a bad day, she will use 1/4 tab up to TID. She has days where she does not require any extra doses.      Impulsive tendencies:  Eating, sex, gambling, and spending.    History of Present Illness (HPI):  48 yo patient with history of YOPD, previously seen by my colleague Dr. Burris, last visit one year ago.    Patient describes initial symptoms of R hand tremor, progressing with mariela and CW over the past 5-7 years.  Had ICD with MPX - " now not taking.  Poorly compliant with CD/LD 25/100 and has tendency to use as needed.    Wants more stability and regular follow-up.    Nonmotor/Premotor symptoms:  Hyposmia? Yes  RBD/sleep issues? Yes  Depression/anxiety? No  Constipation? No  Urinary issues? No  Sexual dysfunction? No  Orthostasis? No  Falls? No  Cognitive impairment? No  Psychoses? No  Pain? No    Per Dr. Burris's notes: 47 yo female with PD.  She takes Mirapex ER 0.75mg daily which has been titrated down due to her obsessive gambling problem. She was started on Sinemet 25/100 that she is taking 1 tablet twice daily. She is tolerating it well without major side effects, but does not think it is helpful at all for right hand tremor. She was previously on Artane with major improvement in symptoms but had to stop that due to forgetfulness and stuttering problems.  Currently she states that her symptoms are worse since March when she stopped Artane. She is having trouble with writing and balance.  Her sleep is fine and has not had any falls. Her gambling problem is improved according to her, but still compulsive 1/week.     ROS:  Review of Systems   Constitutional: Negative for malaise/fatigue and weight loss.   HENT: Negative for hearing loss.    Eyes: Negative for blurred vision and double vision.   Respiratory: Negative for shortness of breath and stridor.    Cardiovascular: Negative for chest pain and palpitations.   Gastrointestinal: Negative for constipation, nausea and vomiting.   Genitourinary: Negative for frequency and urgency.   Musculoskeletal: Negative for falls and myalgias.   Skin: Negative for rash.   Neurological: Positive for tremors. Negative for focal weakness and seizures.   Endo/Heme/Allergies: Does not bruise/bleed easily.   Psychiatric/Behavioral: Negative for depression, hallucinations and memory loss. The patient is not nervous/anxious.      Past Medical History: The patient  has a past medical history of Anxiety,  "Hypertension, Mitral valve prolapse syndrome, Other chronic sinusitis, Parkinson disease (2004), and PONV (postoperative nausea and vomiting).    Social History: The patient  reports that she has never smoked. She does not have any smokeless tobacco history on file. She reports that she drinks about 0.6 oz of alcohol per week.    Family History: Their family history includes Coronary artery disease in her father; Diabetes in her father and mother; Hypertension in her mother; Neuropathy in her mother.    Allergies: No known drug allergies     Meds:   Current Outpatient Medications on File Prior to Visit   Medication Sig Dispense Refill    amantadine HCl (SYMMETREL) 50 mg/5 mL Soln Take 5 ml in morning and repeat dose if needed between 3 PM  and 4 PM. 1 Bottle 5    carbidopa-levodopa (RYTARY) 23.75-95 mg CpSR Take 1 capsule by mouth 3 (three) times daily. 270 capsule 3    carbidopa-levodopa  mg (SINEMET)  mg per tablet Take 0.5 tablets by mouth 4 (four) times daily. 180 tablet 5    nebivolol (BYSTOLIC) 2.5 MG Tab Take 1 tablet (2.5 mg total) by mouth once daily. 5 tablet 0    paroxetine (PAXIL) 20 MG tablet       rasagiline (AZILECT) 1 mg Tab Take 1 tablet (1 mg total) by mouth once daily. 90 tablet 3    alprazolam (XANAX XR) 0.5 MG Tb24 Take by mouth once daily.      paroxetine (PAXIL) 10 MG tablet Take 20 mg by mouth Twice daily.       RYTARY 36. mg CpSR Take 1 capsule by mouth 3 (three) times daily. 270 capsule 3    RYTARY 36. mg CpSR TAKE 1 CAPSULE 3 TIMES A    capsule 1    [DISCONTINUED] benztropine (COGENTIN) 0.5 MG tablet Take 0.5-1 tablets (0.25-0.5 mg total) by mouth 2 (two) times daily. 60 tablet 11     No current facility-administered medications on file prior to visit.      Exam:  /70   Pulse 87   Ht 5' 6" (1.676 m)   Wt 62.4 kg (137 lb 9.1 oz)   LMP 12/28/2011   BMI 22.20 kg/m²     OFF EXAM  * Specialized movement exam  Normal speech.    Mild facial " masking.   R SEVERE bradykinesia while OFF   Mod on L.    Persistent resting tremor of R hand,   Some R foot dystonia, enhanced with gait - present in 2014.   No other chorea, athetosis, myoclonus, or tics.   No motor impersistence.   Gait is normal-based and steady with good stride length      Laboratory/Radiological:  - Results:    - Independent review of images: MRI normal 2011    Diagnoses:          1) Early onset PD, more progressive dyskinesia and motor fluctuations.  On Azilect, Rytary 145s, CD/LD as needed and Amantadine 100 mg daily  2) Hip / leg length discrepancy?  Also has R sided Dequervains - seems to have general worsening or R sided symptoms 2/2 neuro-ortho.  Is it also dystonic?    Medical Decision Making:  - rational polypharmacy --> rytary balanced with amantadine  - R hip films pending  - PT/OT    Luis Enrique Padgett MD, MPH  Division of Movement and Memory Disorders  [facility]  [phone]

## 2019-06-25 ENCOUNTER — HOSPITAL ENCOUNTER (EMERGENCY)
Facility: HOSPITAL | Age: 54
Discharge: HOME | End: 2019-06-25
Attending: EMERGENCY MEDICINE
Payer: COMMERCIAL

## 2019-06-25 VITALS
WEIGHT: 135 LBS | DIASTOLIC BLOOD PRESSURE: 56 MMHG | BODY MASS INDEX: 21.69 KG/M2 | HEART RATE: 92 BPM | RESPIRATION RATE: 18 BRPM | SYSTOLIC BLOOD PRESSURE: 123 MMHG | HEIGHT: 66 IN | OXYGEN SATURATION: 97 % | TEMPERATURE: 98 F
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
## 2019-06-25 DIAGNOSIS — S00.03XA CONTUSION OF SCALP, INITIAL ENCOUNTER: ICD-10-CM

## 2019-06-25 PROCEDURE — 99282 PR EMERGENCY DEPT VISIT,LEVEL II: ICD-10-PCS | Mod: ,,, | Performed by: EMERGENCY MEDICINE

## 2019-06-25 PROCEDURE — 99282 EMERGENCY DEPT VISIT SF MDM: CPT

## 2019-06-25 PROCEDURE — 99282 EMERGENCY DEPT VISIT SF MDM: CPT | Mod: ,,, | Performed by: EMERGENCY MEDICINE

## 2019-06-25 NOTE — ED NOTES
Patient arrives for evaluation of pain to back of head after she tripped today and hit head during fall - patient denies LOC - denies visual changes - denies other injuries - no open wounds

## 2019-06-25 NOTE — ED PROVIDER NOTES
Encounter Date: 6/25/2019    SCRIBE #1 NOTE: I, Leslye Rutherford, am scribing for, and in the presence of,  Dr. Barcenas. I have scribed the entire note.       History     Chief Complaint   Patient presents with    Fall     Pt tripped and fell and hit her head on the elevator door. PT denies blood thinner or LOC. No laceration noted.      The patient is a 53 y.o. female with past medical history of Parkinson's, anxiety, HTN, mitral valve prolapse syndrome, and chronic sinusitis presenting with a chief complaint of left-sided head pain s/p fall. The patient was heading into work when she got her foot stuck and tripped. She knocked her left forehead into a wall. The patient denies losing consciousness, there is no bleeding, nausea, double vision, ear pain or rhinnorrhea. The patient has had a hip replacement and dawson hip pain at baseline.     The history is provided by the patient and medical records.     Review of patient's allergies indicates:   Allergen Reactions    No known drug allergies      Past Medical History:   Diagnosis Date    Anxiety     Hypertension     Mitral valve prolapse syndrome     Other chronic sinusitis     Parkinson disease 2004    Right tremor type    PONV (postoperative nausea and vomiting)      Past Surgical History:   Procedure Laterality Date    [id number] localization at 8am Right 12/28/2015    Performed by Michele Doyle MD at [facility] OR [address] [place] [address]    BREAST SURGERY      TONSILLECTOMY, ADENOIDECTOMY       Family History   Problem Relation Age of Onset    Coronary artery disease Father     Diabetes Father     Hypertension Mother     Neuropathy Mother     Diabetes Mother     Parkinsonism Neg Hx     Breast cancer Neg Hx     Ovarian cancer Neg Hx      Social History     Tobacco Use    Smoking status: Never Smoker   Substance Use Topics    Alcohol use: Yes     Alcohol/week: 0.6 oz     Types: 1 Glasses of wine per week    Drug use: Not on file     Review of Systems    Constitutional: Negative for fever.   HENT: Negative for ear pain and rhinorrhea.    Eyes: Negative for visual disturbance.   Respiratory: Negative for shortness of breath.    Cardiovascular: Negative for leg swelling.   Gastrointestinal: Negative for nausea.   Genitourinary: Negative for frequency.   Musculoskeletal: Negative for myalgias.   Skin: Negative for wound.   Neurological: Positive for headaches. Negative for weakness and light-headedness.       Physical Exam     Initial Vitals [06/25/19 0927]   BP Pulse Resp Temp SpO2   (!) 123/56 92 18 98.4 °F (36.9 °C) 97 %      MAP       --         Physical Exam    Nursing note and vitals reviewed.  Constitutional: She is cooperative.   HENT:   Mild contusion and swelling of the right forehead scalp no open wounds   Eyes: Conjunctivae and EOM are normal.   Neck: Normal range of motion. Neck supple.   Cardiovascular: Normal rate and regular rhythm.   Pulmonary/Chest: No bradypnea. No respiratory distress.   Musculoskeletal: Normal range of motion.   Neurological: She is alert. She has normal strength. No cranial nerve deficit. GCS eye subscore is 4. GCS verbal subscore is 5. GCS motor subscore is 6.   Mild tremor of right upper extremity   Skin: Skin is warm.         ED Course   Procedures  Labs Reviewed - No data to display       Imaging Results    None          Medical Decision Making:   History:   Old Medical Records: I decided to obtain old medical records.  Initial Assessment:   History of Parkinson's recent fall striking the edge of an elevator door and she fell into the elevator after tripping no loss of consciousness good memory            Scribe Attestation:   Scribe #1: I performed the above scribed service and the documentation accurately describes the services I performed. I attest to the accuracy of the note.    Attending Attestation:             Attending ED Notes:   Patient with history of Parkinson's apparently had a fall striking the edge of an  elevator door and she fell into the elevator denies loss of consciousness has good memory of the event patient was evaluated found to be at her baseline neurologically she did have a small contusion of the forehead scalp there was no open wounds was not felt that the patient needed imaging patient is reassured she is also informed that she will probably feel lot of soreness tomorrow             Clinical Impression:       ICD-10-CM ICD-9-CM   1. Fall, initial encounter W19.XXXA E888.9   2. Contusion of scalp, initial encounter S00.03XA 920         Disposition:   Disposition: Discharged  Condition: Stable                        Alexis Pickett MD  06/27/19 5295

## 2019-06-27 ENCOUNTER — HOSPITAL ENCOUNTER (EMERGENCY)
Facility: HOSPITAL | Age: 54
Discharge: HOME | End: 2019-06-27
Attending: EMERGENCY MEDICINE
Payer: COMMERCIAL

## 2019-06-27 VITALS
HEIGHT: 66 IN | OXYGEN SATURATION: 98 % | RESPIRATION RATE: 16 BRPM | DIASTOLIC BLOOD PRESSURE: 64 MMHG | SYSTOLIC BLOOD PRESSURE: 119 MMHG | HEART RATE: 78 BPM | WEIGHT: 135 LBS | TEMPERATURE: 99 F | BODY MASS INDEX: 21.69 KG/M2

## 2019-06-27 DIAGNOSIS — M25.551 RIGHT HIP PAIN: ICD-10-CM
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
## 2019-06-27 DIAGNOSIS — S09.90XA TRAUMATIC INJURY OF HEAD, INITIAL ENCOUNTER: ICD-10-CM

## 2019-06-27 PROCEDURE — 99284 EMERGENCY DEPT VISIT MOD MDM: CPT | Mod: ,,, | Performed by: PHYSICIAN ASSISTANT

## 2019-06-27 PROCEDURE — 99284 PR EMERGENCY DEPT VISIT,LEVEL IV: ICD-10-PCS | Mod: ,,, | Performed by: PHYSICIAN ASSISTANT

## 2019-06-27 PROCEDURE — 99284 EMERGENCY DEPT VISIT MOD MDM: CPT | Mod: 25

## 2019-06-27 NOTE — ED PROVIDER NOTES
"Encounter Date: 6/27/2019       History     Chief Complaint   Patient presents with    Head Injury     Pt states that she was seen for head injury on Tues, was seen here.  She states that her workman's comp wants CT scan and hip xray.     52 y/o WF with history of HTN, mitral valve prolapse, parkinsons presents to the ED c/ head injury. On 6/25 she fell at work, struck her head on a wall, denies any LOC. She is not on any blood thinners. She was evaluated in the ED - she had a normal exam and no imaging was done. She still had a headache today so she contacted workers comp. She was instructed by them to present to the ED for CT head, neck imaging and R hip imaging. She reports minimal pain that is 3/10 "soreness" to the neck and R hip. She is s/p R hip replacement in May 2018. She denies f/c, chest pain, SOB, abdominal pain, nausea, dysuria, numbness, changes in vision, dizziness.     The history is provided by the patient.     Review of patient's allergies indicates:   Allergen Reactions    No known drug allergies      Past Medical History:   Diagnosis Date    Anxiety     Hypertension     Mitral valve prolapse syndrome     Other chronic sinusitis     Parkinson disease 2004    Right tremor type    PONV (postoperative nausea and vomiting)      Past Surgical History:   Procedure Laterality Date    [id number]/wire localization at 8am Right 12/28/2015    Performed by Michele Doyle MD at [facility] OR [address] [place] [address]    BREAST SURGERY      TONSILLECTOMY, ADENOIDECTOMY       Family History   Problem Relation Age of Onset    Coronary artery disease Father     Diabetes Father     Hypertension Mother     Neuropathy Mother     Diabetes Mother     Parkinsonism Neg Hx     Breast cancer Neg Hx     Ovarian cancer Neg Hx      Social History     Tobacco Use    Smoking status: Never Smoker   Substance Use Topics    Alcohol use: Yes     Alcohol/week: 0.6 oz     Types: 1 Glasses of wine per week    Drug use: Not " on file     Review of Systems   Constitutional: Negative for chills and fever.   HENT: Negative for congestion, rhinorrhea and sore throat.    Eyes: Negative for photophobia and visual disturbance.   Respiratory: Negative for shortness of breath.    Cardiovascular: Negative for chest pain.   Gastrointestinal: Negative for abdominal pain, constipation, diarrhea, nausea and vomiting.   Genitourinary: Negative for dysuria and hematuria.   Musculoskeletal: Positive for arthralgias (R hip) and neck pain. Negative for back pain and myalgias.   Skin: Negative for rash and wound.   Neurological: Positive for headaches. Negative for light-headedness and numbness.   Psychiatric/Behavioral: Negative for confusion.       Physical Exam     Initial Vitals [06/27/19 1239]   BP Pulse Resp Temp SpO2   119/64 78 16 98.7 °F (37.1 °C) 98 %      MAP       --         Physical Exam    Nursing note and vitals reviewed.  Constitutional: She appears well-developed and well-nourished. She is not diaphoretic. No distress.   HENT:   Head: Normocephalic and atraumatic.   Neck: Normal range of motion. Neck supple. Muscular tenderness present. Normal range of motion present. No neck rigidity.   Cardiovascular: Normal rate, regular rhythm and normal heart sounds. Exam reveals no gallop and no friction rub.    No murmur heard.  Pulmonary/Chest: Breath sounds normal. She has no wheezes. She has no rhonchi. She has no rales.   Abdominal: Soft. Bowel sounds are normal. There is no tenderness. There is no rebound and no guarding.   Musculoskeletal: She exhibits tenderness (R hip).   Tremor to RUE - chronic from Parkinsons. Pain to the R hip when ranging the R leg. Normal sensation to bilateral LE. Bilateral pedal pulses 2+   Neurological: She is alert and oriented to person, place, and time.   Skin: Skin is warm and dry. No rash noted. No erythema.   Psychiatric: She has a normal mood and affect.         ED Course   Procedures  Labs Reviewed - No data  to display       Imaging Results          CT Head Without Contrast (Final result)  Result time 06/27/19 14:31:56    Final result by Kvng Allred DO (06/27/19 14:31:56)                 Impression:      Unremarkable noncontrast CT head specifically without evidence for acute intracranial hemorrhage.  Clinical correlation and further evaluation as warranted.      Electronically signed by: Kvng Allred DO  Date:    06/27/2019  Time:    14:31             Narrative:    EXAMINATION:  CT HEAD WITHOUT CONTRAST    CLINICAL HISTORY:  Head trauma, headache;    TECHNIQUE:  Multiple sequential 5 mm axial images of the head without contrast.  Coronal and sagittal reformatted imaging from the axial acquisition.    COMPARISON:  None    FINDINGS:  There is no evidence for acute intracranial hemorrhage or sulcal effacement.  The ventricles are normal in size without hydrocephalus.  There is no midline shift or mass effect.  Visualized paranasal sinuses and mastoid air cells are clear.                               CT Cervical Spine Without Contrast (Final result)  Result time 06/27/19 14:40:26    Final result by Wesley Lacey MD (06/27/19 14:40:26)                 Impression:      1. No acute displaced fracture or dislocation of the cervical spine.  2. Biapical pulmonary scarring or atelectasis.  3. Additional findings above.      Electronically signed by: Wesley Lacey MD  Date:    06/27/2019  Time:    14:40             Narrative:    EXAMINATION:  CT CERVICAL SPINE WITHOUT CONTRAST    CLINICAL HISTORY:  C-spine trauma, NEXUS/CCR negative, low risk;    TECHNIQUE:  Low dose axial images, sagittal and coronal reformations were performed though the cervical spine.  Contrast was not administered.    COMPARISON:  None    FINDINGS:  Examination is limited secondary to motion artifact.    There is biapical atelectasis or scarring.  The esophagus is somewhat patulous.  The thyroid gland, parotid glands, and remaining salivary  glands are grossly unremarkable.  No significant tonsillar enlargement.  No significant cervical lymphadenopathy.  The paraspinous and hypopharyngeal soft tissues are grossly unremarkable.    Sagittal reformatted imaging demonstrates grossly adequate alignment of the cervical spine without significant vertebral body height loss or disc space height loss noting limited evaluation of the odontoid secondary to motion artifact.  No severe spinal canal stenosis or severe neuroforaminal narrowing at any level.  The airway is patent.                               X-Ray Hip 2 View Right (Final result)  Result time 06/27/19 15:32:16    Final result by Alvin Rangel MD (06/27/19 15:32:16)                 Impression:      Postoperative changes of total right hip arthroplasty.  No radiographic evidence of periprosthetic fracture or subsidence.  Comparison with prior examinations would be most helpful.    Nonspecific increase in cortical thickening involving the visualized proximal right femur.    Well corticated ossific fragments in the region of the lesser trochanter on the right.  The findings may represent remote injury.      Electronically signed by: Alvin Rangel MD  Date:    06/27/2019  Time:    15:32             Narrative:    EXAMINATION:  XR HIP 2 VIEW RIGHT    CLINICAL HISTORY:  R hip pain after a fall 2 days ago, s/p R THR;    TECHNIQUE:  AP view of the pelvis and frog leg lateral view of the right hip were performed.    COMPARISON:  None    FINDINGS:  Pelvis:    There are postoperative changes of total right hip arthroplasty.  The appearance and position of the hardware is unremarkable.  The pelvic ring is intact.  The sacroiliac joints are within normal limits.  There is sclerosis involving the pubic symphysis.  There are phleboliths throughout the pelvis.  There is no evidence of acute fracture or malalignment involving the pelvic bones.    Right hip:    There are postoperative changes of total right hip  arthroplasty.  The hardware appear well seated with no evidence of subsidence identified.  There is overall increase in cortical thickening involving the visualized right femur.  There are well-corticated bony fragments noted in the region of the lesser trochanter.  No definitive acute fracture is identified.                                 Medical Decision Making:   History:   Old Medical Records: I decided to obtain old medical records.  Old Records Summarized: records from previous admission(s).       <> Summary of Records: Seen in this ED 6/25 after fall. No imaging.   Independently Interpreted Test(s):   I have ordered and independently interpreted X-rays - see summary below.       <> Summary of X-Ray Reading(s): Xray R hip: R THR, hardware in place.  Clinical Tests:   Radiological Study: Ordered and Reviewed       APC / Resident Notes:   54 y/o WF with history of HTN, mitral valve prolapse, parkinsons presents to the ED c/ head injury. VSS. Alert and oriented. Tremor to RUE that is chronic and unchanged. Neuro intact. I do not suspect ICH, fracture, dislocation. She was sent here by her workers comp for imaging. CT head/Cspine and xray R hip ordered.     CT head with no ICH.  CT C spine with no fracture.  Xray R hip: s/p R THR, hardware in place.     I do not feel that she needs any further labs or imaging at this time. Stable for discharge.     She was discharged without any new prescriptions. She will continue OTC tylenol and/or advil as needed.  She will follow up with her PCP.  All of the patient's questions were answered.  I reviewed the patient's chart and imaging and discussed the case with my supervising physician.            Attending Attestation:     Physician Attestation Statement for NP/PA:   I discussed this assessment and plan of this patient with the NP/PA, but I did not personally examine the patient. The face to face encounter was performed by the NP/PA.    Other NP/PA Attestation Additions:     History of Present Illness: 53-year-old woman presents for further evaluation of injury sustained 2 days ago.                      Clinical Impression:       ICD-10-CM ICD-9-CM   1. Fall, initial encounter W19.XXXA E888.9   2. Traumatic injury of head, initial encounter S09.90XA 959.01   3. Right hip pain M25.551 719.45         Disposition:   Disposition: Discharged  Condition: Stable                        Roxie Pettit PA-C  06/27/19 2022

## 2019-06-27 NOTE — ED TRIAGE NOTES
Patient reports head injury this past Tuesday. Patient reports being evaluated and discharged from [facility] without complications. Patient presents back to ER today stating that after speaking to the nurse with workman's comp she was instructed to come back to ER for CT scan and xrays. Patient is A&Ox4 and following commands. Denies visual disturbances. Patient does report intermittent headache and right hip pain.

## 2019-07-19 ENCOUNTER — PATIENT MESSAGE (OUTPATIENT)
Dept: NEUROLOGY | Facility: CLINIC | Age: 54
End: 2019-07-19

## 2019-07-22 ENCOUNTER — PATIENT MESSAGE (OUTPATIENT)
Dept: NEUROLOGY | Facility: CLINIC | Age: 54
End: 2019-07-22

## 2019-07-23 RX ORDER — LEVODOPA AND CARBIDOPA 95; 23.75 MG/1; MG/1
CAPSULE, EXTENDED RELEASE ORAL
Qty: 270 CAPSULE | Refills: 3 | Status: SHIPPED | OUTPATIENT
Start: 2019-07-23 | End: 2020-01-09 | Stop reason: SDUPTHER

## 2019-08-05 ENCOUNTER — PATIENT MESSAGE (OUTPATIENT)
Dept: NEUROLOGY | Facility: CLINIC | Age: 54
End: 2019-08-05

## 2019-08-05 RX ORDER — AMANTADINE HYDROCHLORIDE 100 MG/1
100 CAPSULE, GELATIN COATED ORAL 2 TIMES DAILY
Qty: 60 CAPSULE | Refills: 11 | Status: SHIPPED | OUTPATIENT
Start: 2019-08-05 | End: 2019-11-13

## 2019-08-09 RX ORDER — AMANTADINE HYDROCHLORIDE 100 MG/1
100 CAPSULE, GELATIN COATED ORAL 2 TIMES DAILY
Qty: 60 CAPSULE | Refills: 11 | Status: SHIPPED | OUTPATIENT
Start: 2019-08-09 | End: 2019-11-13

## 2019-08-27 ENCOUNTER — PATIENT MESSAGE (OUTPATIENT)
Dept: NEUROLOGY | Facility: CLINIC | Age: 54
End: 2019-08-27

## 2019-09-16 ENCOUNTER — TELEPHONE (OUTPATIENT)
Dept: NEUROLOGY | Facility: CLINIC | Age: 54
End: 2019-09-16

## 2019-09-16 NOTE — TELEPHONE ENCOUNTER
----- Message from Sherie Guillermo sent at 2019 10:42 AM CDT -----  Contact: Pt requesting f/u visit via [employer]    More Detail >>  Appointment Request  Tika Navarro  Sent:  10:01 AM  To: [facility]     Tika Navarro  MRN: [id number] : 1965  Pt Home: [phone]    Entered: [phone]      Message     Appointment Request From: Tika Navarro    With Provider: Luis Enrique Padgett MD [[facility]]    Preferred Date Range: 2019 - 10/31/2019    Preferred Times: Any time    Reason for visit: Existing Patient    Comments:  Please schedule me an appt to update Dr. WILLIS on my PD & falls.

## 2019-09-26 ENCOUNTER — PATIENT MESSAGE (OUTPATIENT)
Dept: NEUROLOGY | Facility: CLINIC | Age: 54
End: 2019-09-26

## 2019-09-27 ENCOUNTER — PATIENT MESSAGE (OUTPATIENT)
Dept: NEUROLOGY | Facility: CLINIC | Age: 54
End: 2019-09-27

## 2019-11-13 ENCOUNTER — OFFICE VISIT (OUTPATIENT)
Dept: NEUROLOGY | Facility: CLINIC | Age: 54
End: 2019-11-13
Payer: COMMERCIAL

## 2019-11-13 VITALS
DIASTOLIC BLOOD PRESSURE: 70 MMHG | HEIGHT: 66 IN | SYSTOLIC BLOOD PRESSURE: 116 MMHG | WEIGHT: 135 LBS | HEART RATE: 76 BPM | BODY MASS INDEX: 21.69 KG/M2

## 2019-11-13 DIAGNOSIS — G20.A1 PARKINSON DISEASE: Primary | ICD-10-CM

## 2019-11-13 PROCEDURE — 99215 PR OFFICE/OUTPT VISIT, EST, LEVL V, 40-54 MIN: ICD-10-PCS | Mod: S$GLB,,, | Performed by: PSYCHIATRY & NEUROLOGY

## 2019-11-13 PROCEDURE — 99215 OFFICE O/P EST HI 40 MIN: CPT | Mod: S$GLB,,, | Performed by: PSYCHIATRY & NEUROLOGY

## 2019-11-13 PROCEDURE — 99999 PR PBB SHADOW E&M-EST. PATIENT-LVL III: ICD-10-PCS | Mod: PBBFAC,,, | Performed by: PSYCHIATRY & NEUROLOGY

## 2019-11-13 PROCEDURE — 99999 PR PBB SHADOW E&M-EST. PATIENT-LVL III: CPT | Mod: PBBFAC,,, | Performed by: PSYCHIATRY & NEUROLOGY

## 2019-11-13 RX ORDER — AMANTADINE HYDROCHLORIDE 100 MG/1
100 TABLET ORAL 2 TIMES DAILY
Qty: 180 TABLET | Refills: 30 | Status: SHIPPED | OUTPATIENT
Start: 2019-11-13 | End: 2020-11-12

## 2020-01-03 ENCOUNTER — TELEPHONE (OUTPATIENT)
Dept: NEUROLOGY | Facility: CLINIC | Age: 55
End: 2020-01-03

## 2020-01-03 NOTE — TELEPHONE ENCOUNTER
----- Message from Fitz Mojica sent at 1/3/2020 10:12 AM CST -----  Contact: Patient   Rx Refill/Request     Is this a Refill or New Rx:  Yes ( patient is out of medication )   Rx Name and Strength:  RYTARY 36. mg CpSR    Preferred Pharmacy with phone number:     [facility]/pharmacy #[id number] - [place] - [address]  [address] [place] [address]  Phone: [phone] Fax: [phone]

## 2020-01-03 NOTE — TELEPHONE ENCOUNTER
----- Message from Fitz Mojica sent at 1/3/2020 10:12 AM CST -----  Contact: Patient   Rx Refill/Request     Is this a Refill or New Rx:  Yes ( patient is out of medication )   Rx Name and Strength:  RYTARY 36. mg CpSR    Preferred Pharmacy with phone number:     [facility]/pharmacy #[id number] - [place] - [address]  [address] [place] [address]  Phone: [phone] Fax: [phone]

## 2020-01-03 NOTE — TELEPHONE ENCOUNTER
----- Message from Epifanio Tyson sent at 1/3/2020  2:50 PM CST -----  Pharmacy below is asking for call back regarding refill request for RYTARY 36. mg CpSR. Pharmacy states they did not receive VM left to fill medication.    [facility]/pharmacy #[id number] - [place] - [address]  [address] [place] [address]  Phone: [phone]

## 2020-01-03 NOTE — TELEPHONE ENCOUNTER
----- Message from Fitz Mojica sent at 1/3/2020 10:12 AM CST -----  Contact: Patient   Rx Refill/Request     Is this a Refill or New Rx:  Yes ( patient is out of medication )   Rx Name and Strength:  RYTARY 36. mg CpSR    Preferred Pharmacy with phone number:     [facility]/pharmacy #[id number] - [place] - [address]  [address] [place] [address]  Phone: [phone] Fax: [phone]

## 2020-02-19 ENCOUNTER — OFFICE VISIT (OUTPATIENT)
Dept: NEUROLOGY | Facility: CLINIC | Age: 55
End: 2020-02-19
Payer: COMMERCIAL

## 2020-02-19 VITALS
BODY MASS INDEX: 21.69 KG/M2 | DIASTOLIC BLOOD PRESSURE: 66 MMHG | HEART RATE: 78 BPM | SYSTOLIC BLOOD PRESSURE: 123 MMHG | WEIGHT: 135 LBS | HEIGHT: 66 IN

## 2020-02-19 DIAGNOSIS — R41.3 MEMORY LOSS: ICD-10-CM

## 2020-02-19 DIAGNOSIS — G20.A1 PD (PARKINSON'S DISEASE): Primary | Chronic | ICD-10-CM

## 2020-02-19 DIAGNOSIS — M25.551 RIGHT HIP PAIN: ICD-10-CM

## 2020-02-19 PROCEDURE — 99999 PR PBB SHADOW E&M-EST. PATIENT-LVL III: ICD-10-PCS | Mod: PBBFAC,,, | Performed by: PSYCHIATRY & NEUROLOGY

## 2020-02-19 PROCEDURE — 99215 PR OFFICE/OUTPT VISIT, EST, LEVL V, 40-54 MIN: ICD-10-PCS | Mod: S$GLB,,, | Performed by: PSYCHIATRY & NEUROLOGY

## 2020-02-19 PROCEDURE — 99999 PR PBB SHADOW E&M-EST. PATIENT-LVL III: CPT | Mod: PBBFAC,,, | Performed by: PSYCHIATRY & NEUROLOGY

## 2020-02-19 PROCEDURE — 99215 OFFICE O/P EST HI 40 MIN: CPT | Mod: S$GLB,,, | Performed by: PSYCHIATRY & NEUROLOGY

## 2020-02-19 NOTE — PROGRESS NOTES
"Name: Tika Navarro  MRN: [id number]   CSN: [id number]      Date: 02/19/2020      Chief Complaint:  2/19/20  -Rytary 145 1 tab TID [id number].  Lasting 4-5 hours before noticing effect wears off.    -Dyskinesias continue, trying amantidine 100 mg Qdaily with about 70% relief.  However, significantly worsens with stress.  -Continues on azilect, tolerating well  -Last fall, September 2019.  Using walker without further falls.    -Lots of stress at work with new boss.    -Memory loss - forgetting names of kids, best friends.  -Describes stabbing pain in R buttocks, no radiation.  Worse in AM.  Trying stretching, massage.  Feels similar pain to prior when she had R hip arthroplasty.      From 11/2019  - still having the Rytary dip  - more dystonia of the R side affecting gait  - not happy with her doing worse  - out of rytary 95s  - more freezing when off  - some ICD with shopping and eating and gambling    June 2019:  - last seen last year  - more off time, better stronger percieved at 3 hours ("Rytary dip")  - takes 1/2 tab cd/ld at 1 hr, then she gets dyskinetic  - no new tremor  - more dy skinesia  - cog ok  - mood stable  - no new bowel or bladder issues    From May 2018:   - 3 hip surgeries, last one was fully replaced R side on May 23rd.  Had some wacky dreams in the post-op ion January, better now.  Previous plate screws in R hip not working, then they added a amelie, and failed as well with poor healing.  Then replaced the whole thing.  Feels well overall now.     - now with a bit of pain but ok  - less dyskinesia now  -adds a little IR in the am (1/4 to 1/2) to boost her on      From Aug 2017:  - taking rytary 145 1 tab tid plus adds in cd/ld 25/100 1/4 tabs as needed  - doing well overall  - feels gait si a bit crooked  - still fairly brittle with dyskinesia  - declines dbs or duopa  - might be good candidate for neuroderm patch study  - dx with jose schuster of hand      From 6/2016 with " Alisia:  Dyskinesias are not bad until she gets anxious. Mainly notes in the RUE/ RLE.  Tends to note dyskinesias in the evening around 7 to 7:30 PM, which annoys her as she is home and eating supper and wants to relax. She mentions that if could choose to have tremor or dyskinesias, she'd choose tremor.     She takes Rytary at 7:30- 12:30- 5:30. She uses 1/4 tab of cd/ld PRN tremor, stiffness, or slowness. On a bad day, she will use 1/4 tab up to TID. She has days where she does not require any extra doses.      Impulsive tendencies:  Eating, sex, gambling, and spending.    History of Present Illness (HPI):  48 yo patient with history of YOPD, previously seen by my colleague Dr. Burris, last visit one year ago.    Patient describes initial symptoms of R hand tremor, progressing with mariela and CW over the past 5-7 years.  Had ICD with MPX - now not taking.  Poorly compliant with CD/LD 25/100 and has tendency to use as needed.    Wants more stability and regular follow-up.    Nonmotor/Premotor symptoms:  Hyposmia? Yes  RBD/sleep issues? Yes  Depression/anxiety? No  Constipation? No  Urinary issues? No  Sexual dysfunction? No  Orthostasis? No  Falls? No  Cognitive impairment? No  Psychoses? No  Pain? No    Per Dr. Burris's notes: 47 yo female with PD.  She takes Mirapex ER 0.75mg daily which has been titrated down due to her obsessive gambling problem. She was started on Sinemet 25/100 that she is taking 1 tablet twice daily. She is tolerating it well without major side effects, but does not think it is helpful at all for right hand tremor. She was previously on Artane with major improvement in symptoms but had to stop that due to forgetfulness and stuttering problems.  Currently she states that her symptoms are worse since March when she stopped Artane. She is having trouble with writing and balance.  Her sleep is fine and has not had any falls. Her gambling problem is improved according to her, but still compulsive  1/week.     ROS:  Review of Systems   Constitutional: Negative for malaise/fatigue and weight loss.   HENT: Negative for hearing loss.    Eyes: Negative for blurred vision and double vision.   Respiratory: Negative for shortness of breath and stridor.    Cardiovascular: Negative for chest pain and palpitations.   Gastrointestinal: Negative for constipation, nausea and vomiting.   Genitourinary: Negative for frequency and urgency.   Musculoskeletal: Negative for falls and myalgias.   Skin: Negative for rash.   Neurological: Positive for tremors. Negative for focal weakness and seizures.   Endo/Heme/Allergies: Does not bruise/bleed easily.   Psychiatric/Behavioral: Negative for depression, hallucinations and memory loss. The patient is not nervous/anxious.      Past Medical History: The patient  has a past medical history of Anxiety, Hypertension, Mitral valve prolapse syndrome, Other chronic sinusitis, Parkinson disease (2004), and PONV (postoperative nausea and vomiting).    Social History: The patient  reports that she has never smoked. She does not have any smokeless tobacco history on file. She reports that she drinks about 1.0 standard drinks of alcohol per week.    Family History: Their family history includes Coronary artery disease in her father; Diabetes in her father and mother; Hypertension in her mother; Neuropathy in her mother.    Allergies: No known drug allergies     Meds:   Current Outpatient Medications on File Prior to Visit   Medication Sig Dispense Refill    alprazolam (XANAX XR) 0.5 MG Tb24 Take by mouth once daily.      amantadine HCl 100 mg Tab Take 1 tablet (100 mg total) by mouth 2 (two) times daily. MUST BE TABLETS 180 tablet 30    carbidopa-levodopa (RYTARY) 23.75-95 mg CpSR Take 1 capsule by mouth 3 (three) times daily. 270 capsule 3    carbidopa-levodopa  mg (SINEMET)  mg per tablet Take 0.5 tablets by mouth 4 (four) times daily. 180 tablet 5    nebivolol (BYSTOLIC) 2.5  "MG Tab Take 1 tablet (2.5 mg total) by mouth once daily. 5 tablet 0    paroxetine (PAXIL) 10 MG tablet Take 20 mg by mouth Twice daily.       paroxetine (PAXIL) 20 MG tablet       rasagiline (AZILECT) 1 mg Tab Take 1 tablet (1 mg total) by mouth once daily. 90 tablet 3    RYTARY 36. mg CpSR Take 1 capsule by mouth 3 (three) times daily. 270 capsule 3    RYTARY 36. mg CpSR TAKE 1 CAPSULE 3 TIMES A    capsule 1     No current facility-administered medications on file prior to visit.      Exam:  Ht 5' 6" (1.676 m)   Wt 61.2 kg (135 lb)   LMP 2011   BMI 21.79 kg/m²     OFF EXAM  * Specialized movement exam  Normal speech.    Mild facial masking.   R SEVERE bradykinesia while OFF on R, Mod on L.    Intermittent resting tremor of R hand, worse when off.  Some R foot dystonia, enhanced with gait - present in .    Intermittent dyskinesias of RUE>LUE, BLE    No other chorea, athetosis, myoclonus, or tics.   No motor impersistence.   Gait is normal-based and steady with good stride length      Laboratory/Radiological:  - Results:  - Independent review of images: MRI normal     Diagnoses:          1) Early onset PD, more progressive dyskinesia and motor fluctuations.  On Azilect, Rytary, and Amantadine 100 mg daily.  2) R hip pain   3) Memory loss - likely some contributions from anxiety    Medical Decision Makin) Rytary 145  2) INCREASE amantadine frequency to BID 0800, 1200 (potential for TID).  Try Gocovri 137 mg.  2) Continue rasagiline  3) Exercise  4) Neuropsychology referral for neurocognitive testing  5) Refer to ortho for hip pain, similar to prior              Luis Enrique Padgett MD, MPH  Division of Movement and Memory Disorders  [facility]  [phone]        " today

## 2020-03-31 RX ORDER — RASAGILINE 1 MG/1
TABLET ORAL
Qty: 90 TABLET | Refills: 3 | Status: SHIPPED | OUTPATIENT
Start: 2020-03-31 | End: 2021-04-07

## 2020-04-16 ENCOUNTER — TELEPHONE (OUTPATIENT)
Dept: NEUROLOGY | Facility: CLINIC | Age: 55
End: 2020-04-16

## 2020-04-22 ENCOUNTER — TELEPHONE (OUTPATIENT)
Dept: NEUROLOGY | Facility: CLINIC | Age: 55
End: 2020-04-22

## 2020-04-29 ENCOUNTER — TELEPHONE (OUTPATIENT)
Dept: NEUROLOGY | Facility: CLINIC | Age: 55
End: 2020-04-29

## 2020-04-29 NOTE — TELEPHONE ENCOUNTER
Attempted to make contact to complete pre-visit questionnaires. No answer left message and call back number.

## 2020-05-04 ENCOUNTER — OFFICE VISIT (OUTPATIENT)
Dept: NEUROLOGY | Facility: CLINIC | Age: 55
End: 2020-05-04
Payer: COMMERCIAL

## 2020-05-04 DIAGNOSIS — R41.3 MEMORY LOSS: ICD-10-CM

## 2020-05-04 DIAGNOSIS — G20.A1 PD (PARKINSON'S DISEASE): Chronic | ICD-10-CM

## 2020-05-04 DIAGNOSIS — M25.551 RIGHT HIP PAIN: ICD-10-CM

## 2020-05-04 DIAGNOSIS — G20.A1 MILD NEUROCOGNITIVE DISORDER DUE TO PARKINSON'S DISEASE: ICD-10-CM

## 2020-05-04 DIAGNOSIS — F41.1 GENERALIZED ANXIETY DISORDER: Primary | ICD-10-CM

## 2020-05-04 DIAGNOSIS — F06.70 MILD NEUROCOGNITIVE DISORDER DUE TO PARKINSON'S DISEASE: ICD-10-CM

## 2020-05-04 PROCEDURE — 90791 PSYCH DIAGNOSTIC EVALUATION: CPT | Mod: 95,,, | Performed by: CLINICAL NEUROPSYCHOLOGIST

## 2020-05-04 PROCEDURE — 90791 PR PSYCHIATRIC DIAGNOSTIC EVALUATION: ICD-10-PCS | Mod: 95,,, | Performed by: CLINICAL NEUROPSYCHOLOGIST

## 2020-05-04 PROCEDURE — 99499 UNLISTED E&M SERVICE: CPT | Mod: 95,,, | Performed by: CLINICAL NEUROPSYCHOLOGIST

## 2020-05-04 PROCEDURE — 99499 NO LOS: ICD-10-PCS | Mod: 95,,, | Performed by: CLINICAL NEUROPSYCHOLOGIST

## 2020-05-04 NOTE — PATIENT INSTRUCTIONS
· Outpatient Neuropsych: Schedule Testing for June or July 2020 to characterize cognitive status, refine differential diagnosis (e.g., does PD partially explain cognitive trouble or just non-PD risk factors), and update treatment plan for patient to optimize cognition.

## 2020-05-04 NOTE — ASSESSMENT & PLAN NOTE
Assessment:  · Reports reduced largely frontal-subcortical type of cognitive trouble (slowed mental speed, retrieval-based memory trouble, trouble with multi-tasking) that is common in PD.   · But, has multiple non-PD cognitive risk factors (longstanding gen. Anxiety that needs improved treatment, middle insomnia, and pain) as well.   Plan:  · Outpatient Neuropsych: Schedule Testing for June or July 2020 to characterize cognitive status, refine differential diagnosis (e.g., does PD partially explain cognitive trouble or just non-PD risk factors), and update treatment plan for patient to optimize cognition.

## 2020-05-04 NOTE — ASSESSMENT & PLAN NOTE
Assessment:  · Longstanding SHAY that has remained fairly consistent on current medication  · Some improvement a few years ago with brief, problem-focused counseling, but that was short-lived  · Likely worsening her cognitive sxs 2/2 PD  Plan:  · After neuropsych testing for June or July 2020 will update treatment plan for patient to include: potential discussion of medication changes with Dr. Padgett and psychotherapy

## 2020-05-04 NOTE — PROGRESS NOTES
NEUROPSYCHOLOGY CONSULT (TELEHEALTH)    Referral Information  Name: Tika Navarro  MRN: [id number]  : 1965  Age: 54 y.o.  Race: White  Gender: female  Referring Provider: Luis Enrique Padgett Md  [address] [place] [address]  Billing: See below for details as coding/billing has changed   Telemedicine:   The patient location is: [place], [place]  [place] [facility] [place]  The chief complaint leading to consultation/medical necessity is: Memory Loss, anxiety, sleep trouble  Visit type: Virtual visit with synchronous audio and video  Total time spent with patient: 49-minutes  Each patient to whom he or she provides medical services by telemedicine is:  (1) informed of the relationship between the physician and patient and the respective role of any other health care provider with respect to management of the patient; and (2) notified that he or she may decline to receive medical services by telemedicine and may withdraw from such care at any time.  Consent/Emergency Plan: The patient expressed an understanding of the purpose of the evaluation and consented to all procedures. I informed the patient of limits to confidentiality and discussed an emergency plan.      SUMMARY/TREATMENT PLAN   Results from the interview indicate the following diagnoses and treatment plan recommendations. This was discussed with the patient who can follow a treatment plan without help from family.    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   · But, has multiple non-PD cognitive risk factors  (longstanding gen. Anxiety that needs improved treatment, middle insomnia, and pain) as well.   Plan:  · Outpatient Neuropsych: Schedule Testing for June or July 2020 to characterize cognitive status, refine differential diagnosis (e.g., does PD partially explain cognitive trouble or just non-PD risk factors), and update treatment plan for patient to optimize cognition.               Psychiatric    Generalized anxiety disorder - Primary    Current Assessment & Plan     Assessment:  · Longstanding SHAY that has remained fairly consistent on current medication  · Some improvement a few years ago with brief, problem-focused counseling, but that was short-lived  · Likely worsening her cognitive sxs 2/2 PD  Plan:  · After neuropsych testing for June or July 2020 will update treatment plan for patient to include: potential discussion of medication changes with Dr. Padgett and psychotherapy           Other Visit Diagnoses     Right hip pain            HISTORY OF PRESENT ILLNESS AND CURRENT SYMPTOMS     Ms. Navarro has active problems noted below.    Cognitive Symptoms:   Type/Examples:   · Attention: None  · Mental Speed: Slowed mental speed (longer to respond, longer to find a word)  · Memory:Yes, short-term memory trouble (events, appointments, conversations); prompting/cueing brings back reliably.   · Language: Unremarkable  · Visuospatial/Perceptual: None  · Executive Functioning: More trouble shifting attention and multi-tasking;    Onset: Around 2019, gradual onset of above cognitive sxs   Course: Progressively worse over the last 12-months. Contextually, increased health issues since 2018, change in boss that has increased stress, and more sleep trouble.     Current Functional Status/Needs:  ADLs  Self-Care Eating Safety Other   Independent: Independent Independent NA     Instrumental IADLs:   Driving Medications/Health Household Finances   -Independent -Independent -Independent -Independent     IADL 5/4/2020    Ability to Use Telephone Operates telephone on own initiative, looks up and dials numbers   Shopping Takes care of all shopping needs independently   Food Preparation Heats and serves prepared meals or prepares meals but does not maintain adequate diet   Housekeeping Performs light daily tasks such as dishwashing, bed making   Laundry Does personal laundry completely   Mode of Transportation Travels independently on public tranportation or drives own car   Responsibility for Own Medications Is responsible for taking medication in correct dosages at correct time   Ability to Handle Finances Manages financial matters independently (budgets, writes checks, pays rent and bills, goes to bank). Collects and keeps track of income         Psychiatric/Behavioral Symptoms:  Mood:  Depression/Dysphoria Anxiety/Fearfulness Irritability   -Mild to mod depression related to PD, future worries, loss of social connection, and  -Generalized Anxiety longstanding with some perfectionism  -On Paxil since 18yo for panic/agoraphobia and SHAY is around the same  -No panic attacks recently -Triggered mainly when she feels she can't do something     PHQ9 5/4/2020   Total Score 15     GAD7 5/4/2020   1. Feeling nervous, anxious, or on edge? 3   2. Not being able to stop or control worrying? 3   3. Worrying too much about different things? 3   4. Trouble relaxing? 3   5. Being so restless that it is hard to sit still? 3   6. Becoming easily annoyed or irritable? 2   7. Feeling afraid as if something awful might happen? 2   8. If you checked off any problems, how difficult have these problems made it for you to do your work, take care of things at home, or get along with other people? 2   SHAY-7 Score 19         Behavior: None now but had obsessive gambling when on mirapex  NPIQ RFS 5/4/2020   WHO IS FILLING OUT FORM? PWD   Does this patient have false beliefs, such as thinking that others are stealing from him/her or planning to harm him/her  in some way? No   Does this patient have hallucinations such as false visions or voices? Chapa she/he seem to hear or see things that are not present? No   Is the patient resistive to help from others at times, or hard to handle? No   Does the patient seem sad or say that he/she is depressed? Yes   Depression/Dysphoria Severity 2   Depression/Dysphoria Distress 4   Does the patient become upset when  from you? Does he/she have any other signs of nervousness such as shortness of breath, sighing, being unable tor elax, or feeling excessively tense? Yes   Anxiety Severity 1   Anxiety Distress 3   Does the patient appear to feel good or act excessively happy? No   Does this patient seem less interested in his/her usual activities or in the activities and plans of others? Yes   Apathy/Indifference Severity 2   Apathy/Indifference Distress 4   Does this patient seem to act cumpolsively, for example, talking to strangers as if she/he knows them, or saying things that may hurt people's feelings? No   Is the patient impatient and cranky? Does he/she have difficulty coping with delays or waiting for planned activities? Yes   Irritability/Liability Severity 2   Irritability/Liability Distress 4   Does the patient engage in repetitive activities such as pacing around the house, handling buttons, wrapping string, or doing other things repeatedly? No   Does this patient awaken you during the night, rise too early in the morning, or take excessive naps during the day? Yes   Nightime Behavior Severity 3   Nightime Behavior Distress 5   Has the patient lost or gained weight, or had a change in the type of food he/she likes? No   NPI Total Severity Score 10   NPI Total Distress Score 20         Neurovegetative:  Sleep/Nighttime  Appetite Energy   -10pm falls asleep and wakes up at 3am for 2-3 years; may fall back asleep from 530-600am;   -Does have nightmares where she screams but no movement   -Normal -Morning energy is  "good  -Afternoon is energy low despite     Suicidal/Homicidal Ideation: None    Physical Symptoms: Pain level is a 6/7 typically when she bends; 0-1 when sitting/standing/walking     PERTINENT BACKGROUND INFORMATION   SOCIAL HISTORY    · Family Status:  (ex- on drugs) and has a boyfriend (since 2004) + 2 adult sons ( and each 2 kids)   · Current Living Situation:  Own home  · Primary Source of Support:   · Daily Activities: Engaged  · Stressors:  · Would like to spend more time with sons  · New boss is "micromanager"  · Longstanding trouble asking for what she needs   · Other Factors:  · Educational Level: HS   · Occupational Status and History:  for [employer] for 20-years    Family History   Problem Relation Age of Onset    Coronary artery disease Father     Diabetes Father     Hypertension Mother     Neuropathy Mother     Diabetes Mother     Parkinsonism Neg Hx     Breast cancer Neg Hx     Ovarian cancer Neg Hx      Family Neurologic History: Alzheimer's (Father at 88yo onset)  Family Psychiatric History: Negative for heritable risk factors    MEDICAL STATUS  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    Generalized anxiety disorder     Past Medical History:   Diagnosis Date    Anxiety     Hypertension     Mitral valve prolapse syndrome     Other chronic sinusitis     Parkinson disease 2004    Right tremor type    PONV (postoperative nausea and vomiting)      Past Surgical History:   Procedure Laterality Date    BREAST SURGERY      TONSILLECTOMY, ADENOIDECTOMY       Updated/Relevant Neurologic History:  · Falls: September 2019 and now uses a walker  · TBI: None  · Seizures: None  · Stroke: None  · Movement Concerns: Onset 7-years ago; young onset subtype; followed by Shirlene Padgett/Fatuma  · Referral " Diagnosis: Memory Loss    Recent Labs   Lab Results   Component Value Date    VULTTCKR03 219 11/02/2011     No results found for: RPR  No results found for: FOLATE  Lab Results   Component Value Date    TSH 1.682 11/02/2011    A0IPBSL 6.7 11/02/2011     No results found for: LABA1C, HGBA1C  No results found for: HIV1X2, VMB32GJIH    Recent Imaging  There is no evidence for acute intracranial hemorrhage or sulcal effacement.  The ventricles are normal in size without hydrocephalus.  There is no midline shift or mass effect.  Visualized paranasal sinuses and mastoid air cells are clear.      Impression       Unremarkable noncontrast CT head specifically without evidence for acute intracranial hemorrhage.  Clinical correlation and further evaluation as warranted.      Electronically signed by: Kvng Allred DO  Date: 06/27/2019         Current Medications    Current Outpatient Medications:     alprazolam (XANAX XR) 0.5 MG Tb24, Take by mouth once daily., Disp: , Rfl:     amantadine HCL (GOCOVRI) 137 mg Cp24, Take 274 mg by mouth once daily., Disp: 180 capsule, Rfl: 3    amantadine HCl 100 mg Tab, Take 1 tablet (100 mg total) by mouth 2 (two) times daily. MUST BE TABLETS, Disp: 180 tablet, Rfl: 30    carbidopa-levodopa (RYTARY) 23.75-95 mg CpSR, Take 1 capsule by mouth 3 (three) times daily., Disp: 270 capsule, Rfl: 3    carbidopa-levodopa  mg (SINEMET)  mg per tablet, Take 0.5 tablets by mouth 4 (four) times daily., Disp: 180 tablet, Rfl: 5    nebivolol (BYSTOLIC) 2.5 MG Tab, Take 1 tablet (2.5 mg total) by mouth once daily., Disp: 5 tablet, Rfl: 0    paroxetine (PAXIL) 10 MG tablet, Take 20 mg by mouth Twice daily. , Disp: , Rfl:     paroxetine (PAXIL) 20 MG tablet, , Disp: , Rfl:     rasagiline (AZILECT) 1 mg Tab, TAKE BY MOUTH 1 TABLET EVERYDAY, Disp: 90 tablet, Rfl: 3    RYTARY 36. mg CpSR, Take 1 capsule by mouth 3 (three) times daily., Disp: 270 capsule, Rfl: 3    RYTARY 36. mg  "CpSR, TAKE 1 CAPSULE 3 TIMES A   DAY, Disp: 270 capsule, Rfl: 1    Updated/Relevant Psychiatric History:   +Counseling: Went to for years to a psychiatrist "not helpful" and had another counselor for a few sessions that was helpful with a workbook  +Meds: Longstanding Paxil and rarely/if ever uses Xanax    MENTAL STATUS AND OBSERVATIONS:  APPEARANCE: Casually dressed and adequate grooming/hygiene.   ALERTNESS/ORIENTATION: Attentive and alert. Fully oriented (x5) to time and place  GAIT: Not assessed  MOTOR MOVEMENTS/MANNERISMS: Not assessed  SPEECH/LANGUAGE: Normal in rate, rhythm, tone, and volume. No significant word finding difficulty noted. Expressive and receptive language was normal.  STATED MOOD/AFFECT: The patients stated mood was "anxious." Affect was congruent with stated mood.   INTERPERSONAL BEHAVIOR: Rapport was quickly and easily established   SUICIDALITY/HOMICIDALITY: Denied  HALLUCINATIONS/DELUSIONS: None evidenced or endorsed  THOUGHT PROCESSES/INSIGHT: Thoughts seemed logical and goal-directed.     QUESTIONNAIRE RESPONSES  Questionnaires were completed via telephone with a psychometrist and reviewed and incorporated by the neuropsychologist.    BILLING  Service Description CPT Code Minutes Units   Psychiatric diagnostic evaluation by physician [phone]   Neurobehavioral status exam by physician [id number]  0   Each additional hour by physician [id number]  0   Test Evaluation Services --  --   Neuropsychological testing evaluation services by physician [id number]  0   Each additional hour by physician [id number]  0   Test Administration and Scoring --  --   Psychological or neuropsychological test administration and scoring by physician [id number]  0   Each additional 30 minutes by physician [id number]  0   Psychological or neuropsychological test administration and scoring by technician [id number]  0   Each additional 30 minutes by technician [id number]  0                   "

## 2020-05-06 ENCOUNTER — PATIENT MESSAGE (OUTPATIENT)
Dept: NEUROLOGY | Facility: CLINIC | Age: 55
End: 2020-05-06

## 2020-05-11 DIAGNOSIS — M25.551 RIGHT HIP PAIN: Primary | ICD-10-CM

## 2020-05-18 DIAGNOSIS — M25.551 RIGHT HIP PAIN: Primary | ICD-10-CM

## 2020-06-25 ENCOUNTER — TELEPHONE (OUTPATIENT)
Dept: NEUROLOGY | Facility: CLINIC | Age: 55
End: 2020-06-25

## 2020-07-23 ENCOUNTER — TELEPHONE (OUTPATIENT)
Dept: NEUROLOGY | Facility: CLINIC | Age: 55
End: 2020-07-23

## 2020-07-24 ENCOUNTER — TELEPHONE (OUTPATIENT)
Dept: NEUROLOGY | Facility: CLINIC | Age: 55
End: 2020-07-24

## 2020-07-30 ENCOUNTER — TELEPHONE (OUTPATIENT)
Dept: NEUROLOGY | Facility: CLINIC | Age: 55
End: 2020-07-30

## 2020-08-04 ENCOUNTER — PATIENT MESSAGE (OUTPATIENT)
Dept: NEUROLOGY | Facility: CLINIC | Age: 55
End: 2020-08-04

## 2020-08-05 ENCOUNTER — PATIENT MESSAGE (OUTPATIENT)
Dept: NEUROLOGY | Facility: CLINIC | Age: 55
End: 2020-08-05

## 2020-08-05 ENCOUNTER — OFFICE VISIT (OUTPATIENT)
Dept: NEUROLOGY | Facility: CLINIC | Age: 55
End: 2020-08-05
Payer: COMMERCIAL

## 2020-08-05 DIAGNOSIS — G20.A1 PARKINSON DISEASE: Primary | ICD-10-CM

## 2020-08-05 PROCEDURE — 99442 PR PHYSICIAN TELEPHONE EVALUATION 11-20 MIN: CPT | Mod: 95,,, | Performed by: PSYCHIATRY & NEUROLOGY

## 2020-08-05 PROCEDURE — 99442 PR PHYSICIAN TELEPHONE EVALUATION 11-20 MIN: ICD-10-PCS | Mod: 95,,, | Performed by: PSYCHIATRY & NEUROLOGY

## 2020-08-12 NOTE — PROGRESS NOTES
Audio Only Telehealth Visit     The patient location is: home  The chief complaint leading to consultation is: f/u PD, discuss DBS  Visit type: Virtual visit with audio only (telephone)  Total time spent with patient: 17 min     The reason for the audio only service rather than synchronous audio and video virtual visit was related to technical difficulties or patient preference/necessity.     Each patient to whom I provide medical services by telemedicine is:  (1) informed of the relationship between the physician and patient and the respective role of any other health care provider with respect to management of the patient; and (2) notified that they may decline to receive medical services by telemedicine and may withdraw from such care at any time. Patient verbally consented to receive this service via voice-only telephone call.       HPI:   - patient converted from AV to audio  - discussed use of medication, still on rytary, amantadine, and MAOB-I, still some fluctuating  - left hand still worse than right  - more issues during the day, more disability with hand/fingers, walking/balance  - cannot work, not only physical, but feels more distracted, forgetful, more anxious  - frustrated, discussed DBS as long term possbility    Assessment and plan:                                                                                            1) Early onset PD, with progressive dyskinesia and motor fluctuations.  More disability.  Treated with principles of ldopa + adjunct care.  On Azilect, Rytary, and Amantadine 100 mg daily.  2) R hip pain. Chronic.  3) Memory loss - likely some contributions from anxiety     Medical Decision Makin) Continue medications as you are  2) considering addition of Gocovri as amantadine alternative  3) Formal NeuroPsych testing ordered, planned soon  4) Assistance with disability  5) exercise and mediterranean diet        Luis Enrique Padgett MD, MPH  Division of Movement and Memory  Disorders [facility]      This service was not originating from a related E/M service provided within the previous 7 days nor will  to an E/M service or procedure within the next 24 hours or my soonest available appointment.  Prevailing standard of care was able to be met in this audio-only visit.

## 2020-08-17 ENCOUNTER — PATIENT MESSAGE (OUTPATIENT)
Dept: NEUROLOGY | Facility: CLINIC | Age: 55
End: 2020-08-17

## 2020-08-17 NOTE — LETTER
08/24/20                 [facility] Neurology [address]  [address]  [facility] [address]  Phone: [phone]  Fax: [phone]   08/24/2020    Patient: Tika Navarro   YOB: 1965   Date of Visit: 08/24/20       To Whom it May Concern:    Tika Navarro was seen in my clinic on 08/05/20. She has Parkinson's disease. Due to the progressive nature of the disease, she is unable to continuing working beginning 08/28/20 through 02/28/20.    If you have any questions or concerns, please don't hesitate to call.    Sincerely,         Luis Enrique Padgett MD

## 2020-08-24 NOTE — TELEPHONE ENCOUNTER
[employer] disabilty form forwarded to disability.  Letter written for pt to be out of work 8/28-2/28/21.  My chart message to pt re:same.

## 2020-09-10 ENCOUNTER — INITIAL CONSULT (OUTPATIENT)
Dept: NEUROLOGY | Facility: CLINIC | Age: 55
End: 2020-09-10
Payer: COMMERCIAL

## 2020-09-10 DIAGNOSIS — G20.A1 MILD NEUROCOGNITIVE DISORDER DUE TO PARKINSON'S DISEASE: Primary | ICD-10-CM

## 2020-09-10 DIAGNOSIS — F41.1 GENERALIZED ANXIETY DISORDER: ICD-10-CM

## 2020-09-10 DIAGNOSIS — F06.70 MILD NEUROCOGNITIVE DISORDER DUE TO PARKINSON'S DISEASE: Primary | ICD-10-CM

## 2020-09-10 DIAGNOSIS — G20.A1 PD (PARKINSON'S DISEASE): Chronic | ICD-10-CM

## 2020-09-10 PROCEDURE — 99499 NO LOS: ICD-10-PCS | Mod: S$GLB,,, | Performed by: CLINICAL NEUROPSYCHOLOGIST

## 2020-09-10 PROCEDURE — 96133 NRPSYC TST EVAL PHYS/QHP EA: CPT | Mod: S$GLB,,, | Performed by: CLINICAL NEUROPSYCHOLOGIST

## 2020-09-10 PROCEDURE — 96132 NRPSYC TST EVAL PHYS/QHP 1ST: CPT | Mod: S$GLB,,, | Performed by: CLINICAL NEUROPSYCHOLOGIST

## 2020-09-10 PROCEDURE — 96132 PR NEUROPSYCHOLOGIC TEST EVAL SVCS, 1ST HR: ICD-10-PCS | Mod: S$GLB,,, | Performed by: CLINICAL NEUROPSYCHOLOGIST

## 2020-09-10 PROCEDURE — 96139 PSYCL/NRPSYC TST TECH EA: CPT | Mod: S$GLB,,, | Performed by: CLINICAL NEUROPSYCHOLOGIST

## 2020-09-10 PROCEDURE — 96138 PSYCL/NRPSYC TECH 1ST: CPT | Mod: S$GLB,,, | Performed by: CLINICAL NEUROPSYCHOLOGIST

## 2020-09-10 PROCEDURE — 96139 PR PSYCH/NEUROPSYCH TEST ADMIN/SCORING, BY TECH, 2+ TESTS, EA ADDTL 30 MIN: ICD-10-PCS | Mod: S$GLB,,, | Performed by: CLINICAL NEUROPSYCHOLOGIST

## 2020-09-10 PROCEDURE — 96138 PR PSYCH/NEUROPSYCH TEST ADMIN/SCORING, BY TECH, 2+ TESTS, 1ST 30 MIN: ICD-10-PCS | Mod: S$GLB,,, | Performed by: CLINICAL NEUROPSYCHOLOGIST

## 2020-09-10 PROCEDURE — 96133 PR NEUROPSYCHOLOGIC TEST EVAL SVCS, EA ADDTL HR: ICD-10-PCS | Mod: S$GLB,,, | Performed by: CLINICAL NEUROPSYCHOLOGIST

## 2020-09-10 PROCEDURE — 99499 UNLISTED E&M SERVICE: CPT | Mod: S$GLB,,, | Performed by: CLINICAL NEUROPSYCHOLOGIST

## 2020-09-15 PROBLEM — F06.70 MILD NEUROCOGNITIVE DISORDER DUE TO PARKINSON'S DISEASE: Chronic | Status: ACTIVE | Noted: 2020-05-04

## 2020-09-15 PROBLEM — G20.A1 MILD NEUROCOGNITIVE DISORDER DUE TO PARKINSON'S DISEASE: Chronic | Status: ACTIVE | Noted: 2020-05-04

## 2020-09-15 PROBLEM — F41.1 GENERALIZED ANXIETY DISORDER: Chronic | Status: ACTIVE | Noted: 2020-05-04

## 2020-09-15 NOTE — ASSESSMENT & PLAN NOTE
Assessment:  · Longstanding SHAY that has remained fairly consistent on current medication  · Some improvement a few years ago with brief, problem-focused counseling, but that was short-lived  · Quite severe on current assessment  Plan:  · Will discuss contribution of anxiety to cognitive symptoms and potential referral to Psychiatry and counseling

## 2020-09-15 NOTE — PROGRESS NOTES
NEUROPSYCHOLOGICAL EVALUATION    Referral Information  Name: Tika Navarro  MRN: [id number]  : 1965  Age: 54 y.o.  Race: White  Gender: female  Referring Provider: Luis Enrique Padgett Md  [id number] [place] [address]  Billing: See below for details as coding/billing has changed   The chief complaint leading to consultation/medical necessity is: Memory Loss, anxiety, sleep trouble  Visit type: Testing, Report, Treatment Plan Visit with Initial Consult on 2020    SUMMARY/TREATMENT PLAN   Results indicate the following diagnoses and treatment plan recommendations. This will be discussed with the patient during a scheduled feedback session.    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    >>In particular, results showed largely mild and very circumscribed difficulty with frontal lobe cognitive skills - trouble with complex aspects of attention (e.g., working memory or holding information in her mind and quickly/accurately/reliably processing it; organizing information for later recall that results her being more forgetful) and problem solving when tasks are unfamiliar/pressured/unstructured. Motor speed/dexterity also remains quite impaired, as would be expected.  >>Fortunately, aside from mild and specific cognitive trouble previously noted, her other executive functions, visual memory, language skills, spatial skills, simple processing speed, and basic aspects of attention. She remains a very capable woman and strategies/accommodations should help her manage cognitive sxs.  -Etiology/Diagnosis:    >>Certainly, Parkinson's disease can result in decline in frontal-subcortical cognitive skills.   >>But, her depression/anxiety are also quite significant. While optimal treatment of psychiatric sxs may not fully improve her cognitive deficits, the severity of her sxs are such that significant improve may likely result in improved cognitive functions. This is good news!    Plan:  >>Neuropsych:  Will discuss findings and treatment plan with her in detail     >>Behavioral/Neuropsychiatric Symptoms:   1. Psychiatry/Medical Psychology: Consultation with psychiatry or a medical psychologist is suggested for a medication evaluation to treat generalized anxiety and depression.  Will discuss with her as if she had a difficult prior experience with another provider  2. Therapy:  She is a very good candidate for psychotherapy and will discuss treatment recommendation with her    >>Neurology: Continued follow-up in Movement Disorder Program    Recommendations for Patient:      >>Sleep Health: Poor sleep has a negative effect on cognition. Several strategies can be helpful:  1. Caffeine intake in the afternoon and evening, as well as stuffing oneself at supper, can decrease the quality of restful sleep throughout the night.  2. Bedtime and wake-up times should be consistent every night and morning so the body becomes used to a single routine, even on the weekends.  3. Engage in daily mental/physical activity  4. No technology use (television, computer, iPad) 1-2 hours before bed.   5. Limit naps  6. Have a wind down routine (e.g., soft lights in the house, bath before bed, reduced fluid intake, songs, reading, less noise) to promote sleep readiness.     >>Practice good cognitive/brain health hygiene:   1. Engage in regular exercise, which increases alertness and arousal and can improve attention and focus.   2. Develop a consistent daily/weekly routine,  3. Eat healthy foods and balanced meals. Talk with your physician or  nutritionist about whats right for you, but a Mediterranean diet has been found to be most effective at promoting brain health.  4. Keep your brain active. Find activities appropriate to stay mentally active.  5. Stay socially engaged. Continue staying active with your family and friends.                      Psychiatric    Generalized anxiety disorder (Chronic)    Current Assessment & Plan     Assessment:  · Longstanding SHAY that has remained fairly consistent on current medication  · Some improvement a few years ago with brief, problem-focused counseling, but that was short-lived  · Quite severe on current assessment  Plan:  · Will discuss contribution of anxiety to cognitive symptoms and potential referral to Psychiatry and counseling             HISTORY OF PRESENT ILLNESS AND CURRENT SYMPTOMS     Ms. Navarro has active problems noted below.    Cognitive Symptoms:   Type/Examples:   · Attention: See below   · Mental Speed: Slowed mental speed (longer to respond, longer to find a word)  · Memory:Yes, short-term memory trouble (events, appointments, conversations); prompting/cueing brings back reliably.   · Language: Unremarkable  · Visuospatial/Perceptual: None  · Executive Functioning: More trouble shifting attention and multi-tasking;    Onset: Around 2019, gradual onset of above cognitive sxs   Course: Progressively worse over the last 12-months. Contextually, increased health issues since 2018, change in boss that has increased stress, and more sleep trouble.     Current Functional Status/Needs:  ADLs  Self-Care Eating Safety Other   Independent: Independent Independent NA     Instrumental IADLs:   Driving Medications/Health Household Finances   -Independent -Independent -Independent -Independent     IADL 5/4/2020   Ability to Use Telephone Operates telephone on own initiative, looks up and dials numbers   Shopping Takes care of all shopping needs independently   Food Preparation Heats and serves prepared  meals or prepares meals but does not maintain adequate diet   Housekeeping Performs light daily tasks such as dishwashing, bed making   Laundry Does personal laundry completely   Mode of Transportation Travels independently on public tranportation or drives own car   Responsibility for Own Medications Is responsible for taking medication in correct dosages at correct time   Ability to Handle Finances Manages financial matters independently (budgets, writes checks, pays rent and bills, goes to bank). Collects and keeps track of income     Psychiatric/Behavioral Symptoms:  Mood:  Depression/Dysphoria Anxiety/Fearfulness Irritability   -Mild to mod depression related to PD, future worries, loss of social connection, and  -Generalized Anxiety longstanding with some perfectionism  -On Paxil since 18yo for panic/agoraphobia and SHAY is around the same  -No panic attacks recently -Triggered mainly when she feels she can't do something     PHQ9 9/10/2020   Total Score 18     GAD7 5/4/2020   1. Feeling nervous, anxious, or on edge? 3   2. Not being able to stop or control worrying? 3   3. Worrying too much about different things? 3   4. Trouble relaxing? 3   5. Being so restless that it is hard to sit still? 3   6. Becoming easily annoyed or irritable? 2   7. Feeling afraid as if something awful might happen? 2   8. If you checked off any problems, how difficult have these problems made it for you to do your work, take care of things at home, or get along with other people? 2   SHAY-7 Score 19       Behavior: None now but had obsessive gambling when on mirapex  NPIQ RFS 5/4/2020   WHO IS FILLING OUT FORM? PWD   Does this patient have false beliefs, such as thinking that others are stealing from him/her or planning to harm him/her in some way? No   Does this patient have hallucinations such as false visions or voices? Chapa she/he seem to hear or see things that are not present? No   Is the patient resistive to help from others  at times, or hard to handle? No   Does the patient seem sad or say that he/she is depressed? Yes   Depression/Dysphoria Severity 2   Depression/Dysphoria Distress 4   Does the patient become upset when  from you? Does he/she have any other signs of nervousness such as shortness of breath, sighing, being unable tor elax, or feeling excessively tense? Yes   Anxiety Severity 1   Anxiety Distress 3   Does the patient appear to feel good or act excessively happy? No   Does this patient seem less interested in his/her usual activities or in the activities and plans of others? Yes   Apathy/Indifference Severity 2   Apathy/Indifference Distress 4   Does this patient seem to act cumpolsively, for example, talking to strangers as if she/he knows them, or saying things that may hurt people's feelings? No   Is the patient impatient and cranky? Does he/she have difficulty coping with delays or waiting for planned activities? Yes   Irritability/Liability Severity 2   Irritability/Liability Distress 4   Does the patient engage in repetitive activities such as pacing around the house, handling buttons, wrapping string, or doing other things repeatedly? No   Does this patient awaken you during the night, rise too early in the morning, or take excessive naps during the day? Yes   Nightime Behavior Severity 3   Nightime Behavior Distress 5   Has the patient lost or gained weight, or had a change in the type of food he/she likes? No   NPI Total Severity Score 10   NPI Total Distress Score 20         Neurovegetative:  Sleep/Nighttime  Appetite Energy   -10pm falls asleep and wakes up at 3am for 2-3 years; may fall back asleep from 530-600am;   -Does have nightmares where she screams but no movement   -Normal -Morning energy is good  -Afternoon is energy low despite     Suicidal/Homicidal Ideation: None    Physical Symptoms: Pain level is a 6/7 typically when she bends; 0-1 when sitting/standing/walking     PERTINENT BACKGROUND  "INFORMATION   SOCIAL HISTORY    · Family Status:  (ex- on drugs) and has a boyfriend (since 2004) + 2 adult sons ( and each 2 kids)   · Current Living Situation:  Own home  · Primary Source of Support:   · Daily Activities: Engaged  · Stressors:  · Would like to spend more time with sons  · New boss is "micromanager"  · Longstanding trouble asking for what she needs   · Other Factors:  · Educational Level: HS   · Occupational Status and History:  for "[employer] for 20-years    Family History   Problem Relation Age of Onset    Coronary artery disease Father     Diabetes Father     Hypertension Mother     Neuropathy Mother     Diabetes Mother     Parkinsonism Neg Hx     Breast cancer Neg Hx     Ovarian cancer Neg Hx      Family Neurologic History: Alzheimer's (Father at 90yo onset)  Family Psychiatric History: Negative for heritable risk factors    MEDICAL STATUS  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    Generalized anxiety disorder     Past Medical History:   Diagnosis Date    Anxiety     Hypertension     Mitral valve prolapse syndrome     Other chronic sinusitis     Parkinson disease 2004    Right tremor type    PONV (postoperative nausea and vomiting)      Past Surgical History:   Procedure Laterality Date    BREAST SURGERY      TONSILLECTOMY, ADENOIDECTOMY       Updated/Relevant Neurologic History:  · Falls: September 2019 and now uses a walker  · TBI: None  · Seizures: None  · Stroke: None  · Movement Concerns: Onset 7-years ago; young onset subtype; followed by Shirlene Padgett/Fat[facility]  · Referral Diagnosis: Memory Loss    Recent Labs   Lab Results   Component Value Date    [id number] 219 11/02/2011     No results found for: RPR  No results found for: FOLATE  Lab Results   Component Value Date    " TSH 1.682 11/02/2011    S0CKHWB 6.7 11/02/2011     No results found for: LABA1C, HGBA1C  No results found for: HIV1X2, HTT17XQEO    Recent Imaging  2019 Head CT  There is no evidence for acute intracranial hemorrhage or sulcal effacement.  The ventricles are normal in size without hydrocephalus.  There is no midline shift or mass effect.  Visualized paranasal sinuses and mastoid air cells are clear.      Impression       Unremarkable noncontrast CT head specifically without evidence for acute intracranial hemorrhage.  Clinical correlation and further evaluation as warranted.      Electronically signed by: Kvng Allred DO  Date: 06/27/2019 2011 Brain MRI  Findings:  MRI demonstrates that the brain is normally formed.     Ventricles are normal in size.  Overall volume of brain is preserved.     Perivascular CSF spaces are present at the base of the brain and in near   the vertex which are considered normal.  No diffusion abnormality is   present.  T2 star images show no evidence of old hemorrhage.  Following   contrast administration there is no abnormal enhancement.  Skull base   structures and calvarium appear intact           Impression:       Normal MRI of the brain     Current Medications    Current Outpatient Medications:     alprazolam (XANAX XR) 0.5 MG Tb24, Take by mouth once daily., Disp: , Rfl:     amantadine HCL (GOCOVRI) 137 mg Cp24, Take 274 mg by mouth once daily., Disp: 180 capsule, Rfl: 3    amantadine HCl 100 mg Tab, Take 1 tablet (100 mg total) by mouth 2 (two) times daily. MUST BE TABLETS, Disp: 180 tablet, Rfl: 30    carbidopa-levodopa (RYTARY) 23.75-95 mg CpSR, Take 1 capsule by mouth 3 (three) times daily., Disp: 270 capsule, Rfl: 3    carbidopa-levodopa (RYTARY) 23.75-95 mg CpSR, Take 1 capsule by mouth 3 (three) times daily., Disp: 270 capsule, Rfl: 3    carbidopa-levodopa  mg (SINEMET)  mg per tablet, Take 0.5 tablets by mouth 4 (four) times daily., Disp: 180 tablet,  "Rfl: 5    nebivolol (BYSTOLIC) 2.5 MG Tab, Take 1 tablet (2.5 mg total) by mouth once daily., Disp: 5 tablet, Rfl: 0    paroxetine (PAXIL) 10 MG tablet, Take 20 mg by mouth Twice daily. , Disp: , Rfl:     paroxetine (PAXIL) 20 MG tablet, , Disp: , Rfl:     rasagiline (AZILECT) 1 mg Tab, TAKE BY MOUTH 1 TABLET EVERYDAY, Disp: 90 tablet, Rfl: 3    RYTARY 36. mg CpSR, Take 1 capsule by mouth 3 (three) times daily., Disp: 270 capsule, Rfl: 3    RYTARY 36. mg CpSR, TAKE 1 CAPSULE 3 TIMES A   DAY, Disp: 270 capsule, Rfl: 1    Updated/Relevant Psychiatric History:   +Counseling: Went to for years to a psychiatrist "not helpful" and had another counselor for a few sessions that was helpful with a workbook  +Meds: Longstanding Paxil and rarely/if ever uses Xanax    MENTAL STATUS AND OBSERVATIONS:  APPEARANCE: Casually dressed and adequate grooming/hygiene.   ALERTNESS/ORIENTATION: Attentive and alert. Fully oriented (x5) to time and place  GAIT: Not assessed  MOTOR MOVEMENTS/MANNERISMS: Not assessed  SPEECH/LANGUAGE: Normal in rate, rhythm, tone, and volume. No significant word finding difficulty noted. Expressive and receptive language was normal.  STATED MOOD/AFFECT: The patients stated mood was "anxious." Affect was congruent with stated mood.   INTERPERSONAL BEHAVIOR: Rapport was quickly and easily established   SUICIDALITY/HOMICIDALITY: Denied  HALLUCINATIONS/DELUSIONS: None evidenced or endorsed  TTEST TAKING BEHAVIOR and VALIDITY: Freestanding and embedded performance validity measures and observation of effort were suggestive of adequate engagement. The current results, therefore, are likely a valid reflection of the patient's current functioning.      PROCEDURES/TESTS ADMINISTERED   In addition to performing a review of pertinent medical records, reviewing limits to confidentiality, conducting a clinical interview, and explaining procedures, the following measures were administered: Regan " Cognitive Assessment (MoCA), [employer] (ACS) Test of Pre-Morbid Functioning (TOPF), Green's MSVT, Wechsler Adult Intelligence Scale-Fourth Edition (WAIS-IV Digit Span, Arithmetic, Symbol Search, Coding Matrix Reasoning and Similarities), Trail Making Test (TMT-A&B; Dorita, et al., 2004), Verbal Fluency Test(FAS/Animals; Dorita, et al., 2004), NAB Naming Test, Juan Complex Figure Copy Trial(Juan CFT), [place] Verbal Learning Test-Second Edition (CVLT-2), Wechsler Memory Scale-Fourth Edition (WMS-IV) Logical Memory and Visual Reproduction subtests, [place] Card Sorting Test (WCST-128), Grooved Pegboard (GPT; Dorita, et al., 2004), SHAY-7/PHQ-9. Manual norms were used unless otherwise indicated.  Review data Appendix Below for scores and percentiles.     TEST RESULTS     Pre-Morbid Functioning    Average range   Mental Status:    MoCA=27/30   Attention/Working Memory:  Normal across most measures   Much better performance (above average) on basic attention measures and relatively lower (low end of average) for more complex attention (mental arithmetic, initial trial on CVLT-2)   Processing or Mental Speed  Some variability but all scores were broadly normal   Simple psychomotor speed was above average   Motor Functions    Bilaterally impaired motor speed and dexterity relatively worse for the dominant right hand   Language    Basic expressive and receptive language was normal on observation   Phonemic fluency was superior and semantic fluency was normal range   Object naming was normal   Verbal reasoning was average range   Visuospatial/  Construction:  Visual reasoning was superior   Copy of a complex figure was grossly normal for the gestalt of the drawing but reduced visual discrimination lowered score   Learning and Memory:    Verbal learning was below average for structured/unstructured information. Repetition was only modestly helpful.   After delay, she retained most of the  information she learned from the structured verbal memory task.     On the unstructured verbal memory task, retention was much lower at only improved slightly with cuing.  Additionally, recognition cues were not particularly helpful as she had trouble discriminating among words she learned and words that were close to what she learned.    Visual learning and memory were normal    Executive or Frontal-lobe Functions  Set shifting was normal   Phonemic fluency was normal   She could draw clock to command without difficulty and generally copying a complex figure in an organized fashion despite trouble with discrimination of details   Verbal reasoning was average in visual reasoning was superior   More complex attention tasks (mental arithmetic, learning unstructured information) were more variable   Conceptual reasoning and problem solving (places greater demands on novel problem solving, adjusting quickly and changing approach to solve when rules change) was below expectations   Psychiatric or Behavioral Symptoms  Significant generalized anxiety and depression reported       BILLING     Service Description CPT Code Minutes Units   Psychiatric diagnostic evaluation by physician [id number]     Neurobehavioral status exam by physician [id number]  0   Each additional hour by physician [id number]  0   Test Evaluation Services --  --   Neuropsychological testing evaluation services by physician [id number] [phone]   Each additional hour by physician [phone]   Test Administration and Scoring --  --   Psychological or neuropsychological test administration and scoring by physician [id number]  0   Each additional 30 minutes by physician [id number]  0   Psychological or neuropsychological test administration and scoring by technician [id number] [phone]   Each additional 30 minutes by technician [phone]       DATA APPENDIX:   Note: It is important to note that scores/percentiles should only be interpreted by a neuropsychologist. It is common for healthy  individuals to have 1-3 isolated low/unusual scores that are not indicative of any significant cognitive dysfunction.      Raw Score Type of Standardized Score Standardized Score   MSVT  - -   MSVT DR 95 - -   MSVT Cons 95 - -   ACS LM II Rec 16 - -   ACS VR II Rec 6 - -   ACS RDS 12 - -   CVLT-II FC 16 - -   PREMORBID FUNCTIONING Raw Score Type of Standardized Score Standardized Score   TOPF simple dem. eFSIQ - SS 97   TOPF pred. eFSIQ -    TOPF simple + pred. eFSIQ - SS 99   INTELLECTUAL FUNCTIONING Raw Score Type of Standardized Score Standardized Score   WAIS-IV      WMI -    PSI - SS 97   Similarities 26 ss 10   Matrix Reasoning 22 ss 15   Digit Span 32 ss 13         DS Forward 14 ss 15         DS Backward 8 ss 10         DS Sequence 10 ss 12         Longest Digit Forward 8 - -         Longest Digit Backward 4 - -         Longest Digit Sequence 6 - -   Arithmetic 12 ss 8   Symbol Search 23 ss 8   Coding 64 ss 11   COGNITIVE SCREENING Raw Score Type of Standardized Score Standardized Score   MoCA 27/30 - -   Orientation - Place 2/2 - -   Orientation - Date 4/4 - -   LANGUAGE FUNCTIONING Raw Score Type of Standardized Score Standardized Score   WAIS-IV Similarities 26 ss 10   TOPF Word Reading 45    NAB Naming 30 Tscore 54   FAS 66 Tscore 70   Animal Naming 21 Tscore 54   VISUOSPATIAL FUNCTIONING Raw Score Type of Standardized Score Standardized Score   WAIS-IV Matrix Reasoning 22 ss 15   RCFT Copy 31 - -   RCFT Time to Copy 243 - -   LEARNING & MEMORY Raw Score Type of Standardized Score Standardized Score   CVLT-II      Trials 1-5 (T-Score) 41 Tscore 40   List A Trial 1 5 zscore -1   List A Trial 5 10 zscore -1   List B 6 zscore 0   SDFR 3 zscore -2.5   SDCR 7 zscore -2   LDFR 4 zscore -3   LDCR 5 zscore -2.5   Semantic Clustering -0.4 zscore -1   Learning [place] 1.2 zscore -0.5   Repetitions 10 zscore 1   Intrusions 9 zscore 1   Recognition Hits 13 zscore -1   False Positives 10 zscore 2    Discriminability 1.4 zscore -2   WMS-IV Subtests      LM I 19 ss 7   LM II 14 ss 7   LM Recognition 16 - -   (CVLT-II Trials 1-5) 40  7   (CVLT-II Long Delay) -3  1   VR I 32 ss 9   VR II 17 ss 8   VR II Recognition 6 - -   VR Copy 42 - -   ATTENTION/WORKING MEMORY Raw Score Type of Standardized Score Standardized Score   WAIS-IV WMI -    WAIS-IV Digit Span 32 ss 13         DS Forward 14 ss 15         DS Backward 8 ss 10         DS Sequence 10 ss 12         Longest Digit Forward 8 - -         Longest Digit Backward 4 - -         Longest Digit Sequence 6 - -   WAIS-IV Arithmetic 12 ss 8   MENTAL PROCESSING SPEED Raw Score Type of Standardized Score Standardized Score   WAIS-IV PSI - SS 97   WAIS-IV Symbol Search 23 ss 8   WAIS-IV Coding 64 ss 11   TMT A  21 Tscore 63   TMT A errors 0 - -   EXECUTIVE FUNCTIONING Raw Score Type of Standardized Score Standardized Score   TMT B 49 Tscore 60   TMT B errors 0 - -   WCST-128      Total Correct 66     Total Errors 62 SS 75   Perseverative Resp. 35 SS 82   Perseverative Err. 28 SS 83   Nonperseverative Err. 34 SS 69   Concept. Level Response 47 SS 76   Categories Completed 3 - -   FMS 0 - -   Learning to Learn -11.11 - -   FRONTOMOTOR  Raw Score Type of Standardized Score Standardized Score   GPT  Tscore 11   GPD  Tscore 29   MOOD & PERSONALITY Raw Score Type of Standardized Score Standardized Score   PHQ-9 18 - -   SHAY-7 17 - -

## 2020-09-15 NOTE — ASSESSMENT & PLAN NOTE
Assessment:  -Cognitive Testing:    >>Neuropsychological testing revealed more cognitive trouble than would be expected based on age alone.    >>In particular, results showed largely mild and very circumscribed difficulty with frontal lobe cognitive skills - trouble with complex aspects of attention (e.g., working memory or holding information in her mind and quickly/accurately/reliably processing it; organizing information for later recall that results her being more forgetful) and problem solving when tasks are unfamiliar/pressured/unstructured. Motor speed/dexterity also remains quite impaired, as would be expected.  >>Fortunately, aside from mild and specific cognitive trouble previously noted, her other executive functions, visual memory, language skills, spatial skills, simple processing speed, and basic aspects of attention. She remains a very capable woman and strategies/accommodations should help her manage cognitive sxs.  -Etiology/Diagnosis:   >>Certainly, Parkinson's disease can result in decline in frontal-subcortical cognitive skills.   >>But, her depression/anxiety are also quite significant. While optimal treatment of psychiatric sxs may not fully improve her cognitive deficits, the severity of her sxs are such that significant improve may likely result in improved cognitive functions. This is good news!    Plan:  >>Neuropsych:  Will discuss findings and treatment plan with her in detail     >>Behavioral/Neuropsychiatric Symptoms:   1. Psychiatry/Medical Psychology: Consultation with psychiatry or a medical psychologist is suggested for a medication evaluation to treat generalized anxiety and depression.  Will discuss with her as if she had a difficult prior experience with another provider  2. Therapy:  She is a very good candidate for psychotherapy and will discuss treatment recommendation with her    >>Neurology: Continued follow-up in Movement Disorder Program    Recommendations for Patient:       >>Sleep Health: Poor sleep has a negative effect on cognition. Several strategies can be helpful:  1. Caffeine intake in the afternoon and evening, as well as stuffing oneself at supper, can decrease the quality of restful sleep throughout the night.  2. Bedtime and wake-up times should be consistent every night and morning so the body becomes used to a single routine, even on the weekends.  3. Engage in daily mental/physical activity  4. No technology use (television, computer, iPad) 1-2 hours before bed.   5. Limit naps  6. Have a wind down routine (e.g., soft lights in the house, bath before bed, reduced fluid intake, songs, reading, less noise) to promote sleep readiness.     >>Practice good cognitive/brain health hygiene:   1. Engage in regular exercise, which increases alertness and arousal and can improve attention and focus.   2. Develop a consistent daily/weekly routine,  3. Eat healthy foods and balanced meals. Talk with your physician or nutritionist about whats right for you, but a Mediterranean diet has been found to be most effective at promoting brain health.  4. Keep your brain active. Find activities appropriate to stay mentally active.  5. Stay socially engaged. Continue staying active with your family and friends.

## 2020-09-21 ENCOUNTER — OFFICE VISIT (OUTPATIENT)
Dept: NEUROLOGY | Facility: CLINIC | Age: 55
End: 2020-09-21
Payer: COMMERCIAL

## 2020-09-21 DIAGNOSIS — F41.1 GENERALIZED ANXIETY DISORDER: ICD-10-CM

## 2020-09-21 DIAGNOSIS — F06.70 MILD NEUROCOGNITIVE DISORDER DUE TO PARKINSON'S DISEASE: Primary | ICD-10-CM

## 2020-09-21 DIAGNOSIS — G20.A1 MILD NEUROCOGNITIVE DISORDER DUE TO PARKINSON'S DISEASE: Primary | ICD-10-CM

## 2020-09-21 PROCEDURE — 99499 UNLISTED E&M SERVICE: CPT | Mod: GT,95,, | Performed by: CLINICAL NEUROPSYCHOLOGIST

## 2020-09-21 PROCEDURE — 99499 NO LOS: ICD-10-PCS | Mod: GT,95,, | Performed by: CLINICAL NEUROPSYCHOLOGIST

## 2020-09-21 NOTE — PROGRESS NOTES
NEUROPSYCHOLOGY FEEDBACK (TELEHEALTH)    Referral Information  Name: Tika Navarro  MRN: [id number]  : 1965  Age: 54 y.o.  Billing: Charges for Neuropsychology Feedback billed on 9/10/2020    Telemedicine:   The patient location is: Home  The provider [facility] is: [facility]  The chief complaint leading to consultation/medical necessity is: Feedback session for results/treatment plan discussion  Visit type: Virtual visit with synchronous audio and video  Total time spent with patient: 41-minutes  Each patient to whom he or she provides medical services by telemedicine is:  (1) informed of the relationship between the physician and patient and the respective role of any other health care provider with respect to management of the patient; and (2) notified that he or she may decline to receive medical services by telemedicine and may withdraw from such care at any time.  Consent/Emergency Plan: The patient expressed an understanding of the purpose of the evaluation and consented to all procedures. I informed the patient of limits to confidentiality and discussed an emergency plan.    Note: Review Neuropsychology Consult dated for 9/10/2020 details of feedback discussion. No further neuropsychology feedback needed.

## 2020-09-21 NOTE — PATIENT INSTRUCTIONS
[email or web address]   -put in your zip code and insurance for a list of therapists.

## 2020-09-28 ENCOUNTER — PATIENT MESSAGE (OUTPATIENT)
Dept: NEUROLOGY | Facility: CLINIC | Age: 55
End: 2020-09-28

## 2020-10-07 ENCOUNTER — PATIENT MESSAGE (OUTPATIENT)
Dept: NEUROLOGY | Facility: CLINIC | Age: 55
End: 2020-10-07

## 2020-10-07 ENCOUNTER — TELEPHONE (OUTPATIENT)
Dept: NEUROLOGY | Facility: CLINIC | Age: 55
End: 2020-10-07

## 2020-10-07 NOTE — TELEPHONE ENCOUNTER
----- Message from Christina Hogan sent at 10/7/2020 12:20 PM CDT -----  Regarding: pt advice  Contact: Nahum@ [phone] ref# [id number]  Caller is asking to speak with someone in Dr. Padgett's office regarding getting patient's last office visit notes, and asking if there are any changes with the patient. Please call.

## 2020-10-09 ENCOUNTER — PATIENT MESSAGE (OUTPATIENT)
Dept: NEUROLOGY | Facility: CLINIC | Age: 55
End: 2020-10-09

## 2020-12-01 ENCOUNTER — PATIENT MESSAGE (OUTPATIENT)
Dept: NEUROLOGY | Facility: CLINIC | Age: 55
End: 2020-12-01

## 2021-02-11 ENCOUNTER — PATIENT MESSAGE (OUTPATIENT)
Dept: NEUROLOGY | Facility: CLINIC | Age: 56
End: 2021-02-11

## 2021-03-08 ENCOUNTER — PATIENT MESSAGE (OUTPATIENT)
Dept: NEUROLOGY | Facility: CLINIC | Age: 56
End: 2021-03-08

## 2021-03-11 ENCOUNTER — TELEPHONE (OUTPATIENT)
Dept: NEUROLOGY | Facility: CLINIC | Age: 56
End: 2021-03-11

## 2021-03-19 ENCOUNTER — TELEPHONE (OUTPATIENT)
Dept: NEUROLOGY | Facility: CLINIC | Age: 56
End: 2021-03-19

## 2021-03-22 ENCOUNTER — TELEPHONE (OUTPATIENT)
Dept: NEUROLOGY | Facility: CLINIC | Age: 56
End: 2021-03-22

## 2021-03-26 ENCOUNTER — PATIENT MESSAGE (OUTPATIENT)
Dept: NEUROLOGY | Facility: CLINIC | Age: 56
End: 2021-03-26

## 2021-03-26 ENCOUNTER — TELEPHONE (OUTPATIENT)
Dept: NEUROLOGY | Facility: CLINIC | Age: 56
End: 2021-03-26

## 2021-03-30 ENCOUNTER — TELEPHONE (OUTPATIENT)
Dept: NEUROLOGY | Facility: CLINIC | Age: 56
End: 2021-03-30

## 2021-04-05 ENCOUNTER — TELEPHONE (OUTPATIENT)
Dept: NEUROLOGY | Facility: CLINIC | Age: 56
End: 2021-04-05

## 2021-04-08 ENCOUNTER — TELEPHONE (OUTPATIENT)
Dept: NEUROLOGY | Facility: CLINIC | Age: 56
End: 2021-04-08

## 2021-04-12 ENCOUNTER — TELEPHONE (OUTPATIENT)
Dept: NEUROLOGY | Facility: CLINIC | Age: 56
End: 2021-04-12

## 2021-04-12 ENCOUNTER — OFFICE VISIT (OUTPATIENT)
Dept: ORTHOPEDICS | Facility: CLINIC | Age: 56
End: 2021-04-12
Payer: COMMERCIAL

## 2021-04-12 ENCOUNTER — HOSPITAL ENCOUNTER (OUTPATIENT)
Dept: RADIOLOGY | Facility: HOSPITAL | Age: 56
Discharge: HOME | End: 2021-04-12
Attending: ORTHOPAEDIC SURGERY
Payer: COMMERCIAL

## 2021-04-12 VITALS — HEIGHT: 66 IN | BODY MASS INDEX: 22.23 KG/M2 | WEIGHT: 138.31 LBS

## 2021-04-12 DIAGNOSIS — T84.84XA PAIN DUE TO TOTAL HIP REPLACEMENT, INITIAL ENCOUNTER: Primary | ICD-10-CM

## 2021-04-12 DIAGNOSIS — Z96.649 PAIN DUE TO TOTAL HIP REPLACEMENT, INITIAL ENCOUNTER: Primary | ICD-10-CM

## 2021-04-12 DIAGNOSIS — M25.551 RIGHT HIP PAIN: ICD-10-CM

## 2021-04-12 PROCEDURE — 99204 PR OFFICE/OUTPT VISIT, NEW, LEVL IV, 45-59 MIN: ICD-10-PCS | Mod: S$GLB,,, | Performed by: ORTHOPAEDIC SURGERY

## 2021-04-12 PROCEDURE — 73502 X-RAY EXAM HIP UNI 2-3 VIEWS: CPT | Mod: 26,RT,, | Performed by: RADIOLOGY

## 2021-04-12 PROCEDURE — 99204 OFFICE O/P NEW MOD 45 MIN: CPT | Mod: S$GLB,,, | Performed by: ORTHOPAEDIC SURGERY

## 2021-04-12 PROCEDURE — 73502 XR HIP 2 VIEW RIGHT: ICD-10-PCS | Mod: 26,RT,, | Performed by: RADIOLOGY

## 2021-04-12 PROCEDURE — 73502 X-RAY EXAM HIP UNI 2-3 VIEWS: CPT | Mod: TC,RT

## 2021-04-12 PROCEDURE — 99999 PR PBB SHADOW E&M-EST. PATIENT-LVL III: CPT | Mod: PBBFAC,,, | Performed by: ORTHOPAEDIC SURGERY

## 2021-04-12 PROCEDURE — 99999 PR PBB SHADOW E&M-EST. PATIENT-LVL III: ICD-10-PCS | Mod: PBBFAC,,, | Performed by: ORTHOPAEDIC SURGERY

## 2021-04-12 NOTE — TELEPHONE ENCOUNTER
----- Message from Martha Pitts sent at 4/12/2021  4:01 PM CDT -----  Contact: Shanelle @[phone] ext [phone]  [employer]line requesting or confirmation that they rec request for pt visit notes from 8/5/202. Please follow up

## 2021-04-13 ENCOUNTER — TELEPHONE (OUTPATIENT)
Dept: ORTHOPEDICS | Facility: CLINIC | Age: 56
End: 2021-04-13

## 2021-05-10 ENCOUNTER — PATIENT MESSAGE (OUTPATIENT)
Dept: ORTHOPEDICS | Facility: CLINIC | Age: 56
End: 2021-05-10

## 2021-05-26 ENCOUNTER — PATIENT MESSAGE (OUTPATIENT)
Dept: ORTHOPEDICS | Facility: CLINIC | Age: 56
End: 2021-05-26

## 2021-05-31 ENCOUNTER — OFFICE VISIT (OUTPATIENT)
Dept: NEUROLOGY | Facility: CLINIC | Age: 56
End: 2021-05-31
Payer: COMMERCIAL

## 2021-05-31 VITALS
HEART RATE: 80 BPM | HEIGHT: 66 IN | BODY MASS INDEX: 22.25 KG/M2 | WEIGHT: 138.44 LBS | DIASTOLIC BLOOD PRESSURE: 75 MMHG | SYSTOLIC BLOOD PRESSURE: 125 MMHG

## 2021-05-31 DIAGNOSIS — G20.A1 PD (PARKINSON'S DISEASE): Primary | ICD-10-CM

## 2021-05-31 PROCEDURE — 99215 PR OFFICE/OUTPT VISIT, EST, LEVL V, 40-54 MIN: ICD-10-PCS | Mod: S$GLB,,, | Performed by: PSYCHIATRY & NEUROLOGY

## 2021-05-31 PROCEDURE — 99999 PR PBB SHADOW E&M-EST. PATIENT-LVL III: CPT | Mod: PBBFAC,,, | Performed by: PSYCHIATRY & NEUROLOGY

## 2021-05-31 PROCEDURE — 99999 PR PBB SHADOW E&M-EST. PATIENT-LVL III: ICD-10-PCS | Mod: PBBFAC,,, | Performed by: PSYCHIATRY & NEUROLOGY

## 2021-05-31 PROCEDURE — 99215 OFFICE O/P EST HI 40 MIN: CPT | Mod: S$GLB,,, | Performed by: PSYCHIATRY & NEUROLOGY

## 2021-05-31 RX ORDER — ALPRAZOLAM 0.5 MG/1
.25-.5 TABLET ORAL DAILY PRN
COMMUNITY
Start: 2021-03-08 | End: 2023-03-15

## 2021-05-31 RX ORDER — ASPIRIN 325 MG
50000 TABLET, DELAYED RELEASE (ENTERIC COATED) ORAL DAILY
COMMUNITY
Start: 2021-04-19 | End: 2022-08-01

## 2021-05-31 RX ORDER — ESCITALOPRAM OXALATE 10 MG/1
10 TABLET ORAL DAILY
COMMUNITY
Start: 2021-05-15 | End: 2023-03-15 | Stop reason: SDUPTHER

## 2021-06-02 ENCOUNTER — OFFICE VISIT (OUTPATIENT)
Dept: ORTHOPEDICS | Facility: CLINIC | Age: 56
End: 2021-06-02
Payer: COMMERCIAL

## 2021-06-02 DIAGNOSIS — T84.84XA PAIN DUE TO TOTAL HIP REPLACEMENT, INITIAL ENCOUNTER: Primary | ICD-10-CM

## 2021-06-02 DIAGNOSIS — Z96.649 PAIN DUE TO TOTAL HIP REPLACEMENT, INITIAL ENCOUNTER: Primary | ICD-10-CM

## 2021-06-02 DIAGNOSIS — M25.551 RIGHT HIP PAIN: ICD-10-CM

## 2021-06-02 PROCEDURE — 99213 PR OFFICE/OUTPT VISIT, EST, LEVL III, 20-29 MIN: ICD-10-PCS | Mod: 95,,, | Performed by: ORTHOPAEDIC SURGERY

## 2021-06-02 PROCEDURE — 99213 OFFICE O/P EST LOW 20 MIN: CPT | Mod: 95,,, | Performed by: ORTHOPAEDIC SURGERY

## 2021-06-09 ENCOUNTER — PATIENT MESSAGE (OUTPATIENT)
Dept: ORTHOPEDICS | Facility: CLINIC | Age: 56
End: 2021-06-09

## 2021-06-09 ENCOUNTER — TELEPHONE (OUTPATIENT)
Dept: INTERVENTIONAL RADIOLOGY/VASCULAR | Facility: HOSPITAL | Age: 56
End: 2021-06-09

## 2021-06-15 ENCOUNTER — HOSPITAL ENCOUNTER (OUTPATIENT)
Dept: INTERVENTIONAL RADIOLOGY/VASCULAR | Facility: HOSPITAL | Age: 56
Discharge: HOME | End: 2021-06-15
Attending: ORTHOPAEDIC SURGERY
Payer: COMMERCIAL

## 2021-06-15 VITALS
SYSTOLIC BLOOD PRESSURE: 132 MMHG | RESPIRATION RATE: 16 BRPM | HEART RATE: 68 BPM | OXYGEN SATURATION: 99 % | DIASTOLIC BLOOD PRESSURE: 75 MMHG

## 2021-06-15 DIAGNOSIS — Z96.649 PAIN DUE TO TOTAL HIP REPLACEMENT, INITIAL ENCOUNTER: ICD-10-CM

## 2021-06-15 DIAGNOSIS — T84.84XA PAIN DUE TO TOTAL HIP REPLACEMENT, INITIAL ENCOUNTER: ICD-10-CM

## 2021-06-15 DIAGNOSIS — M25.551 RIGHT HIP PAIN: ICD-10-CM

## 2021-06-15 PROCEDURE — 20611 IR ASPIRATION INJECTION LARGE JOINT W FLUORO: ICD-10-PCS | Mod: RT,,, | Performed by: RADIOLOGY

## 2021-06-15 PROCEDURE — 20611 DRAIN/INJ JOINT/BURSA W/US: CPT | Mod: RT | Performed by: RADIOLOGY

## 2021-06-15 PROCEDURE — A4215 STERILE NEEDLE: HCPCS

## 2021-06-16 ENCOUNTER — PATIENT MESSAGE (OUTPATIENT)
Dept: ORTHOPEDICS | Facility: CLINIC | Age: 56
End: 2021-06-16

## 2021-07-13 ENCOUNTER — PATIENT MESSAGE (OUTPATIENT)
Dept: NEUROLOGY | Facility: CLINIC | Age: 56
End: 2021-07-13

## 2021-07-13 DIAGNOSIS — G20.A1 PD (PARKINSON'S DISEASE): Primary | ICD-10-CM

## 2021-07-15 RX ORDER — LEVODOPA AND CARBIDOPA 95; 23.75 MG/1; MG/1
2 CAPSULE, EXTENDED RELEASE ORAL 3 TIMES DAILY
Qty: 180 CAPSULE | Refills: 5 | Status: SHIPPED | OUTPATIENT
Start: 2021-07-15 | End: 2021-10-08

## 2021-07-24 ENCOUNTER — PATIENT MESSAGE (OUTPATIENT)
Dept: NEUROLOGY | Facility: CLINIC | Age: 56
End: 2021-07-24

## 2021-08-06 ENCOUNTER — LAB VISIT (OUTPATIENT)
Dept: INTERNAL MEDICINE | Facility: CLINIC | Age: 56
End: 2021-08-06
Payer: COMMERCIAL

## 2021-08-06 DIAGNOSIS — R09.81 NASAL CONGESTION: ICD-10-CM

## 2021-08-06 DIAGNOSIS — J02.9 SORE THROAT: ICD-10-CM

## 2021-08-06 DIAGNOSIS — J02.9 SORE THROAT: Primary | ICD-10-CM

## 2021-08-06 PROCEDURE — U0003 INFECTIOUS AGENT DETECTION BY NUCLEIC ACID (DNA OR RNA); SEVERE ACUTE RESPIRATORY SYNDROME CORONAVIRUS 2 (SARS-COV-2) (CORONAVIRUS DISEASE [COVID-19]), AMPLIFIED PROBE TECHNIQUE, MAKING USE OF HIGH THROUGHPUT TECHNOLOGIES AS DESCRIBED BY CMS-2020-01-R: HCPCS | Performed by: NURSE PRACTITIONER

## 2021-08-06 PROCEDURE — U0005 INFEC AGEN DETEC AMPLI PROBE: HCPCS | Performed by: NURSE PRACTITIONER

## 2021-08-07 LAB
SARS-COV-2 RNA RESP QL NAA+PROBE: NOT DETECTED
SARS-COV-2- CYCLE NUMBER: -1

## 2021-08-09 ENCOUNTER — PATIENT MESSAGE (OUTPATIENT)
Dept: NEUROLOGY | Facility: CLINIC | Age: 56
End: 2021-08-09

## 2021-08-20 ENCOUNTER — PATIENT MESSAGE (OUTPATIENT)
Dept: NEUROLOGY | Facility: CLINIC | Age: 56
End: 2021-08-20

## 2021-08-24 ENCOUNTER — TELEPHONE (OUTPATIENT)
Dept: NEUROLOGY | Facility: CLINIC | Age: 56
End: 2021-08-24

## 2021-08-24 DIAGNOSIS — G20.A1 PD (PARKINSON'S DISEASE): Primary | ICD-10-CM

## 2021-08-25 ENCOUNTER — PATIENT MESSAGE (OUTPATIENT)
Dept: NEUROLOGY | Facility: CLINIC | Age: 56
End: 2021-08-25

## 2021-08-27 ENCOUNTER — TELEPHONE (OUTPATIENT)
Dept: NEUROLOGY | Facility: CLINIC | Age: 56
End: 2021-08-27

## 2021-09-04 ENCOUNTER — PATIENT MESSAGE (OUTPATIENT)
Dept: NEUROLOGY | Facility: CLINIC | Age: 56
End: 2021-09-04

## 2021-09-18 ENCOUNTER — IMMUNIZATION (OUTPATIENT)
Dept: INTERNAL MEDICINE | Facility: CLINIC | Age: 56
End: 2021-09-18
Payer: COMMERCIAL

## 2021-09-18 DIAGNOSIS — Z23 NEED FOR VACCINATION: Primary | ICD-10-CM

## 2021-09-18 PROCEDURE — 91300 COVID-19, MRNA, LNP-S, PF, 30 MCG/0.3 ML DOSE VACCINE: CPT | Mod: ,,, | Performed by: INTERNAL MEDICINE

## 2021-09-18 PROCEDURE — 0002A COVID-19, MRNA, LNP-S, PF, 30 MCG/0.3 ML DOSE VACCINE: ICD-10-PCS | Mod: CV19,,, | Performed by: INTERNAL MEDICINE

## 2021-09-18 PROCEDURE — 0002A COVID-19, MRNA, LNP-S, PF, 30 MCG/0.3 ML DOSE VACCINE: CPT | Mod: CV19,,, | Performed by: INTERNAL MEDICINE

## 2021-09-18 PROCEDURE — 91300 COVID-19, MRNA, LNP-S, PF, 30 MCG/0.3 ML DOSE VACCINE: ICD-10-PCS | Mod: ,,, | Performed by: INTERNAL MEDICINE

## 2021-09-30 ENCOUNTER — OFFICE VISIT (OUTPATIENT)
Dept: NEUROLOGY | Facility: CLINIC | Age: 56
End: 2021-09-30
Payer: COMMERCIAL

## 2021-09-30 DIAGNOSIS — G20.A1 PARKINSON DISEASE: Primary | ICD-10-CM

## 2021-09-30 PROCEDURE — 99213 PR OFFICE/OUTPT VISIT, EST, LEVL III, 20-29 MIN: ICD-10-PCS | Mod: 95,,, | Performed by: PSYCHIATRY & NEUROLOGY

## 2021-09-30 PROCEDURE — 99213 OFFICE O/P EST LOW 20 MIN: CPT | Mod: 95,,, | Performed by: PSYCHIATRY & NEUROLOGY

## 2021-09-30 RX ORDER — LEVODOPA AND CARBIDOPA 145; 36.25 MG/1; MG/1
1 CAPSULE, EXTENDED RELEASE ORAL 3 TIMES DAILY
Qty: 270 CAPSULE | Refills: 3 | Status: SHIPPED | OUTPATIENT
Start: 2021-09-30 | End: 2022-09-14

## 2022-03-08 ENCOUNTER — TELEPHONE (OUTPATIENT)
Dept: NEUROLOGY | Facility: CLINIC | Age: 57
End: 2022-03-08
Payer: COMMERCIAL

## 2022-03-08 NOTE — TELEPHONE ENCOUNTER
----- Message from Kole Hogan sent at 3/8/2022  2:46 PM CST -----  Regarding: Call Back  Who Called: pt          What is the reason for the call: pt is calling in regards to scheduling appointment. No next available. Please contact to further assist.           Can patient be contacted on [facility]: Yes          Call back number: [phone]

## 2022-05-04 ENCOUNTER — PATIENT MESSAGE (OUTPATIENT)
Dept: NEUROLOGY | Facility: CLINIC | Age: 57
End: 2022-05-04
Payer: COMMERCIAL

## 2022-06-02 ENCOUNTER — PATIENT MESSAGE (OUTPATIENT)
Dept: NEUROLOGY | Facility: CLINIC | Age: 57
End: 2022-06-02
Payer: COMMERCIAL

## 2022-06-03 ENCOUNTER — OFFICE VISIT (OUTPATIENT)
Dept: URGENT CARE | Facility: CLINIC | Age: 57
End: 2022-06-03
Payer: COMMERCIAL

## 2022-06-03 VITALS
HEIGHT: 66 IN | OXYGEN SATURATION: 97 % | BODY MASS INDEX: 20.89 KG/M2 | HEART RATE: 96 BPM | WEIGHT: 130 LBS | TEMPERATURE: 98 F | DIASTOLIC BLOOD PRESSURE: 66 MMHG | RESPIRATION RATE: 18 BRPM | SYSTOLIC BLOOD PRESSURE: 118 MMHG

## 2022-06-03 DIAGNOSIS — M79.642 HAND PAIN, LEFT: Primary | ICD-10-CM

## 2022-06-03 PROCEDURE — 73130 XR HAND COMPLETE 3 VIEW LEFT: ICD-10-PCS | Mod: FY,LT,S$GLB, | Performed by: RADIOLOGY

## 2022-06-03 PROCEDURE — 99203 PR OFFICE/OUTPT VISIT, NEW, LEVL III, 30-44 MIN: ICD-10-PCS | Mod: S$GLB,,, | Performed by: NURSE PRACTITIONER

## 2022-06-03 PROCEDURE — 73130 X-RAY EXAM OF HAND: CPT | Mod: FY,LT,S$GLB, | Performed by: RADIOLOGY

## 2022-06-03 PROCEDURE — 99203 OFFICE O/P NEW LOW 30 MIN: CPT | Mod: S$GLB,,, | Performed by: NURSE PRACTITIONER

## 2022-06-03 RX ORDER — NAPROXEN 500 MG/1
500 TABLET ORAL 2 TIMES DAILY
Qty: 30 TABLET | Refills: 0 | Status: SHIPPED | OUTPATIENT
Start: 2022-06-03 | End: 2022-08-01

## 2022-06-03 NOTE — PROGRESS NOTES
"Subjective:       Patient ID: Tika Navarro is a 56 y.o. female.    Vitals:  height is 5' 6" (1.676 m) and weight is 59 kg (130 lb). Her temperature is 98.4 °F (36.9 °C). Her blood pressure is 118/66 and her pulse is 96. Her respiration is 18 and oxygen saturation is 97%.     Chief Complaint: Hand Pain (Left. no trauma)    55 y/o female presents to  today with c/o left hand pain up to 8-10/10.  Pt has been using thin, soft wrist wrap at home to help with pain. Denies impact, trauma, or injury to site. Denies edema. Denies fever or recent illness.     No obvious signs of trauma noted. Negative Tinel's sign. Difficult to bend wrist to attempt Phalen's maneuver.     Pt offered toradol injection, but declines at this time. Hand XR including wrist- shows no acute process.     Hand Pain   Incident onset: last night. Incident location: no trauma. There was no injury mechanism. The pain is present in the left hand. The quality of the pain is described as aching and stabbing. The pain radiates to the left arm. The pain is at a severity of 8/10. The pain is severe. The pain has been constant since the incident. Nothing aggravates the symptoms. Treatments tried: wearing brace. The treatment provided no relief.       Musculoskeletal: Positive for pain. Negative for trauma.       Objective:      Physical Exam   Constitutional: She is oriented to person, place, and time.  Non-toxic appearance. She does not appear ill. She appears distressed (due to pain).   HENT:   Head: Normocephalic.   Nose: Nose normal.   Mouth/Throat: Mucous membranes are moist. Oropharynx is clear.   Cardiovascular: Normal rate.   Pulmonary/Chest: Effort normal.   Musculoskeletal:         General: Tenderness present. No swelling, deformity or signs of injury.   Neurological: She is alert and oriented to person, place, and time. She has normal motor skills and normal sensation. Coordination normal.   Skin: Skin is warm, dry and not diaphoretic. " Capillary refill takes less than 2 seconds.   Psychiatric: Her behavior is normal. Mood normal.   Vitals reviewed.      XR HAND COMPLETE 3 VIEW LEFT    Result Date: 6/3/2022  EXAMINATION: XR HAND COMPLETE 3 VIEW LEFT CLINICAL HISTORY: Pain in left hand FINDINGS: Hand complete three views left: No fracture dislocation bone destruction seen.  No trauma seen.  No foreign body seen.  There is mild DJD.  The radial styloid demonstrates a chronic area of periosteal ossification. Electronically signed by: Uriel Jennings MD Date:    06/03/2022 Time:    11:50  Assessment:       1. Hand pain, left          Plan:         Hand pain, left  -     XR HAND COMPLETE 3 VIEW LEFT; Future; Expected date: 06/03/2022  -     naproxen (NAPROSYN) 500 MG tablet; Take 1 tablet (500 mg total) by mouth 2 (two) times daily.  Dispense: 30 tablet; Refill: 0  -     Ambulatory referral/consult to Hand Surgery

## 2022-06-06 ENCOUNTER — OFFICE VISIT (OUTPATIENT)
Dept: NEUROLOGY | Facility: CLINIC | Age: 57
End: 2022-06-06
Payer: COMMERCIAL

## 2022-06-06 VITALS — SYSTOLIC BLOOD PRESSURE: 111 MMHG | HEART RATE: 82 BPM | DIASTOLIC BLOOD PRESSURE: 70 MMHG

## 2022-06-06 DIAGNOSIS — G20.A1 PARKINSON DISEASE: Primary | ICD-10-CM

## 2022-06-06 PROCEDURE — 99999 PR PBB SHADOW E&M-EST. PATIENT-LVL III: ICD-10-PCS | Mod: PBBFAC,,, | Performed by: PSYCHIATRY & NEUROLOGY

## 2022-06-06 PROCEDURE — 99999 PR PBB SHADOW E&M-EST. PATIENT-LVL III: CPT | Mod: PBBFAC,,, | Performed by: PSYCHIATRY & NEUROLOGY

## 2022-06-06 PROCEDURE — 99214 OFFICE O/P EST MOD 30 MIN: CPT | Mod: S$GLB,,, | Performed by: PSYCHIATRY & NEUROLOGY

## 2022-06-06 PROCEDURE — 99214 PR OFFICE/OUTPT VISIT, EST, LEVL IV, 30-39 MIN: ICD-10-PCS | Mod: S$GLB,,, | Performed by: PSYCHIATRY & NEUROLOGY

## 2022-06-06 NOTE — PROGRESS NOTES
Name: Tika Navarro  MRN: [id number]   CSN: [id number]      Date: 06/06/2022      Chief Complaint:    Interval History:  - amantadine caused livedo, she's generally ok with   - more dyskinesia and dystonia of R arm and leg  - some falls, usually happens with a trip but more dramatic than if she did not have PD  - no big injuries, feels her vital proteins are helping  - gambling is still an issue, playing video poker machines - at the one place she goes, but has had wins and losses of $1000+ at a time  - eating healthy, sex drive is higher than her partner, says it is ok with their relationship  - NOW is finally interested in DBS, not intersted in any med changes at this time    From 9/30/21:  - 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, reduced down to only 1 tab a day and the reti remains  - rytary 95 TID - if higher, gets back dyskinesia  - getting PT TID, at [facility] [address]  - lost about 6 lbs, weighs about 130  - really feeling only slightly off at times  - willing to try 145s  - enjoying [facility]    2021:  - feeling more progression, more into the left  - more left hand tremor  - feels rytary wearing off, in the dip at 3-4 hours  - tingling in fingers and toes are periodically  - R hip is tighter than ever, post surgery 3 years ago - more pain now  - some worry about memory in the house, more micorwave issues   -     From 2/19/20  -Rytary 145 1 tab TID [id number].  Lasting 4-5 hours before noticing effect wears off.    -Dyskinesias continue, trying amantidine 100 mg Qdaily with about 70% relief.  However, significantly worsens with stress.  -Continues on azilect, tolerating well  -Last fall, September 2019.  Using walker without further falls.    -Lots of stress at work with new boss.    -Memory loss - forgetting names of kids, best friends.  -Describes stabbing pain in R buttocks, no radiation.  Worse in AM.  Trying stretching, massage.  Feels similar pain to prior when she had R hip  "arthroplasty.      From 11/2019  - still having the Rytary dip  - more dystonia of the R side affecting gait  - not happy with her doing worse  - out of rytary 95s  - more freezing when off  - some ICD with shopping and eating and gambling    June 2019:  - last seen last year  - more off time, better stronger percieved at 3 hours ("Rytary dip")  - takes 1/2 tab cd/ld at 1 hr, then she gets dyskinetic  - no new tremor  - more dy skinesia  - cog ok  - mood stable  - no new bowel or bladder issues    From May 2018:   - 3 hip surgeries, last one was fully replaced R side on May 23rd.  Had some wacky dreams in the post-op ion January, better now.  Previous plate screws in R hip not working, then they added a amelie, and failed as well with poor healing.  Then replaced the whole thing.  Feels well overall now.     - now with a bit of pain but ok  - less dyskinesia now  -adds a little IR in the am (1/4 to 1/2) to boost her on      From Aug 2017:  - taking rytary 145 1 tab tid plus adds in cd/ld 25/100 1/4 tabs as needed  - doing well overall  - feels gait si a bit crooked  - still fairly brittle with dyskinesia  - declines dbs or duopa  - might be good candidate for neuroderm patch study  - dx with jose schuster of hand      From 6/2016 with [facility]:  Dyskinesias are not bad until she gets anxious. Mainly notes in the RUE/ RLE.  Tends to note dyskinesias in the evening around 7 to 7:30 PM, which annoys her as she is home and eating supper and wants to relax. She mentions that if could choose to have tremor or dyskinesias, she'd choose tremor.     She takes Rytary at 7:30- 12:30- 5:30. She uses 1/4 tab of cd/ld PRN tremor, stiffness, or slowness. On a bad day, she will use 1/4 tab up to TID. She has days where she does not require any extra doses.      Impulsive tendencies:  Eating, sex, gambling, and spending.    History of Present Illness (HPI):  46 yo patient with history of YOPD, previously seen by my colleague Dr. Burris, " last visit one year ago.    Patient describes initial symptoms of R hand tremor, progressing with mariela and CW over the past 5-7 years.  Had ICD with [facility] - now not taking.  Poorly compliant with CD/LD 25/100 and has tendency to use as needed.    Wants more stability and regular follow-up.    Nonmotor/Premotor symptoms:  Hyposmia? Yes  RBD/sleep issues? Yes  Depression/anxiety? No  Constipation? No  Urinary issues? No  Sexual dysfunction? No  Orthostasis? No  Falls? No  Cognitive impairment? No  Psychoses? No  Pain? No    Per Dr. Burris's notes: 45 yo female with PD.  She takes Mirapex ER 0.75mg daily which has been titrated down due to her obsessive gambling problem. She was started on Sinemet 25/100 that she is taking 1 tablet twice daily. She is tolerating it well without major side effects, but does not think it is helpful at all for right hand tremor. She was previously on Artane with major improvement in symptoms but had to stop that due to forgetfulness and stuttering problems.  Currently she states that her symptoms are worse since March when she stopped Artane. She is having trouble with writing and balance.  Her sleep is fine and has not had any falls. Her gambling problem is improved according to her, but still compulsive 1/week.     ROS:  Review of Systems   Constitutional: Negative for malaise/fatigue and weight loss.   HENT: Negative for hearing loss.    Eyes: Negative for blurred vision and double vision.   Respiratory: Negative for shortness of breath and stridor.    Cardiovascular: Negative for chest pain and palpitations.   Gastrointestinal: Negative for constipation, nausea and vomiting.   Genitourinary: Negative for frequency and urgency.   Musculoskeletal: Negative for falls and myalgias.   Skin: Negative for rash.   Neurological: Positive for tremors. Negative for focal weakness and seizures.   Endo/Heme/Allergies: Does not bruise/bleed easily.   Psychiatric/Behavioral: Negative for depression,  hallucinations and memory loss. The patient is not nervous/anxious.      Past Medical History: The patient  has a past medical history of Anxiety, Hypertension, Mitral valve prolapse syndrome, Other chronic sinusitis, Parkinson disease (2004), and PONV (postoperative nausea and vomiting).    Social History: The patient  reports that she has never smoked. She has never used smokeless tobacco. She reports current alcohol use of about 1.0 standard drink of alcohol per week.    Family History: Their family history includes Coronary artery disease in her father; Diabetes in her father and mother; Hypertension in her mother; Neuropathy in her mother.    Allergies: No known drug allergies     Meds:   Current Outpatient Medications on File Prior to Visit   Medication Sig Dispense Refill    ALPRAZolam (XANAX XR) 0.5 MG Tb24 Take by mouth once daily.      ALPRAZolam (XANAX) 0.5 MG tablet Take 0.25-0.5 mg by mouth daily as needed.      amantadine HCL (SYMMETREL) 100 mg capsule TAKE 1 CAPSULE BY MOUTH TWICE A  capsule 3    carbidopa-levodopa (RYTARY) 36. mg CpSR Take 1 capsule by mouth 3 (three) times daily. 270 capsule 3    EScitalopram oxalate (LEXAPRO) 10 MG tablet Take 10 mg by mouth once daily.      nebivolol (BYSTOLIC) 2.5 MG Tab Take 1 tablet (2.5 mg total) by mouth once daily. 5 tablet 0    rasagiline (AZILECT) 1 mg Tab TAKE 1 TABLET BY MOUTH EVERY DAY 90 tablet 3    RYTARY 23.75-95 mg CpSR TAKE 1 CAPSULE BY MOUTH THREE TIMES A  capsule 3    carbidopa-levodopa  mg (SINEMET)  mg per tablet Take 0.5 tablets by mouth 4 (four) times daily. 180 tablet 5    cholecalciferol, vitamin D3, 1,250 mcg (50,000 unit) capsule Take 50,000 Units by mouth once daily.       naproxen (NAPROSYN) 500 MG tablet Take 1 tablet (500 mg total) by mouth 2 (two) times daily. 30 tablet 0     No current facility-administered medications on file prior to visit.     Exam:  /70   Pulse 82   LMP  2011     OFF EXAM  * Specialized movement exam  Normal speech.    Mild facial masking.   R SEVERE bradykinesia while OFF on R, Mod on L.    Intermittent resting tremor of R hand, worse when off.  Some R foot dystonia, enhanced with gait - present in .    Intermittent dyskinesias of RUE>LUE, BLE    No other chorea, athetosis, myoclonus, or tics.   No motor impersistence.   Gait is normal-based and steady with good stride length      Laboratory/Radiological:  - Results:  - Independent review of images: MRI normal     Diagnoses:          1) Early onset PD, more progressive dyskinesia and motor fluctuations.  On Azilect, Rytary, and Amantadine 100 mg daily.  2) R hip pain - stable.  Reviewed xray again, got steroids earlier  3) New L wrist pain after fall.  Hurts along the edge of the radius, perhaps in anatomic snuffbox - query small crack vs scaphoid/navicular injury.  3) Memory loss - likely some contributions from anxiety    Medical Decision Makin) Rytary 95 TID  2) INCREASE amantadine frequency to BID 0800, 1200 (potential for TID).  Try Gocovri 137 mg.  3) Continue rasagiline.  4) Exercise  5) Ortho for hip pain, similar to prior  6) Will see if [employer] can get approved for rescue              Luis Enrique Padgett MD, MPH  Division of Movement and Memory Disorders  [facility]  [phone]

## 2022-07-21 ENCOUNTER — TELEPHONE (OUTPATIENT)
Dept: NEUROLOGY | Facility: CLINIC | Age: 57
End: 2022-07-21
Payer: COMMERCIAL

## 2022-07-21 NOTE — TELEPHONE ENCOUNTER
LVM for patient to contact the clinic to be scheduled for a DBS Consult. Left contact information on the voicemail.

## 2022-09-28 ENCOUNTER — PATIENT MESSAGE (OUTPATIENT)
Dept: NEUROLOGY | Facility: CLINIC | Age: 57
End: 2022-09-28
Payer: COMMERCIAL

## 2022-10-13 ENCOUNTER — TELEPHONE (OUTPATIENT)
Dept: NEUROLOGY | Facility: CLINIC | Age: 57
End: 2022-10-13
Payer: COMMERCIAL

## 2022-10-13 NOTE — TELEPHONE ENCOUNTER
----- Message from Perla Machado sent at 10/13/2022 10:08 AM CDT -----  Contact: pt  Pt requesting a callback to get a sooner appt           Confirmed contact below:  Contact Name:Tika Navarro  Phone Number: [phone]

## 2022-10-31 ENCOUNTER — TELEPHONE (OUTPATIENT)
Dept: PULMONOLOGY | Facility: CLINIC | Age: 57
End: 2022-10-31
Payer: COMMERCIAL

## 2022-10-31 NOTE — TELEPHONE ENCOUNTER
LVM Advising patient  she's scheduled on 11/18/2022 at 10:00 am.  I advised  that appointment needs to be reschedule  due to provider book out.  My name and office number was provided to receive a call back.

## 2022-11-08 ENCOUNTER — OFFICE VISIT (OUTPATIENT)
Dept: NEUROLOGY | Facility: CLINIC | Age: 57
End: 2022-11-08
Payer: COMMERCIAL

## 2022-11-08 VITALS
HEIGHT: 66 IN | HEART RATE: 103 BPM | WEIGHT: 129.06 LBS | DIASTOLIC BLOOD PRESSURE: 66 MMHG | BODY MASS INDEX: 20.74 KG/M2 | SYSTOLIC BLOOD PRESSURE: 118 MMHG

## 2022-11-08 DIAGNOSIS — G20.A1 PD (PARKINSON'S DISEASE): Primary | Chronic | ICD-10-CM

## 2022-11-08 PROCEDURE — 99214 OFFICE O/P EST MOD 30 MIN: CPT | Mod: S$GLB,,, | Performed by: PSYCHIATRY & NEUROLOGY

## 2022-11-08 PROCEDURE — 99214 PR OFFICE/OUTPT VISIT, EST, LEVL IV, 30-39 MIN: ICD-10-PCS | Mod: S$GLB,,, | Performed by: PSYCHIATRY & NEUROLOGY

## 2022-11-08 PROCEDURE — 99999 PR PBB SHADOW E&M-EST. PATIENT-LVL III: ICD-10-PCS | Mod: PBBFAC,,, | Performed by: PSYCHIATRY & NEUROLOGY

## 2022-11-08 PROCEDURE — 99999 PR PBB SHADOW E&M-EST. PATIENT-LVL III: CPT | Mod: PBBFAC,,, | Performed by: PSYCHIATRY & NEUROLOGY

## 2022-11-08 NOTE — ASSESSMENT & PLAN NOTE
YOPD with dyskinesias  Potentially a good DBS candidate  We dicussed in detail her goals and expectations for DBS  We dicussed that this therapy could allow her to decrease dyskinesia, dystonia, come off amantadine, and most importantly better ONtime  At this point she may do well with either STN or GPI targets with slight preference for GPI given memory issues    Suggetsed ON/OFF eval  Neuropsych re-eval  Neurosurgery eval

## 2022-11-08 NOTE — PROGRESS NOTES
"Name: Tika Navarro  MRN: [id number]   CSN: [id number]      Date: 11/08/2022    Chief Complaint: DBS Eval    HPI: 56 y.o. woman with HTN, anxiety, R hip replacement, MVP, with iPD starting 2004 coming for DBS eval per Dr. Padgett. She notes in 2004 R hand 5th digit started to have a postural tremor. This progressed to the arm that years. Her gait was uninvolved until much more recently. Fell and broke elbow 2017. R hip pain began and grew until she was wheelchair due to pain until a repair 2018 (revised several times). After surgery she started feeling imbalanced. Does not fall but trips at times. Does not use an assist device. Can walk 2 blocks before out of breath.  (Pulmonology workup ongoing). Dyskinesias started 4 years ago. At times she can rock herself out of a chair (sliding down and out). Mild R foot dystonia started 4 years ago.  Some FOG when she turns x 4 years (when OFF).    Sweats profusely when medications wear off    2 brothers and sister. No neuro issues.  Parents without neuro issues.  Has 2 boys- 40 and 38.    Med hx  Did not take medications till 2010  Started carbidopa/levodopa 25/100mg and amantadine    Current medications  -Rytary 145 1 tab TID [id number].  Lasting 4-5 hours before noticing effect wears off.    When it wears off her walking slows r>L.  -Dyskinesias continue, trying amantidine 100 mg Qdaily with about 70% relief.    Auditory hallucinations at times  Mild orthostasis    DBS expectations    She'd like to decrease medications to decrease dyskinesias  She'd like to be able to better balance  She wants her speech top be clearer  She'd like more energy  She'd like her hip pain  to resolve - but unclear if is from prior hip surgery    Had neuropsych testing 1 year prior    "Prior  Interval History:  - amantadine caused livedo, she's generally ok with   - more dyskinesia and dystonia of R arm and leg  - some falls, usually happens with a trip but more dramatic than if she did " "not have PD  - no big injuries, feels her vital proteins are helping  - gambling is still an issue, playing video poker machines - at the one place she goes, but has had wins and losses of $1000+ at a time  - eating healthy, sex drive is higher than her partner, says it is ok with their relationship  - NOW is finally interested in DBS, not intersted in any med changes at this time    From 9/30/21:  - 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, reduced down to only 1 tab a day and the reti remains  - rytary 95 TID - if higher, gets back dyskinesia  - getting PT TID, [facility]  - lost about 6 lbs, weighs about 130  - really feeling only slightly off at times  - willing to try 145s  - enjoying half-way    2021:  - feeling more progression, more into the left  - more left hand tremor  - feels rytary wearing off, in the dip at 3-4 hours  - tingling in fingers and toes are periodically  - R hip is tighter than ever, post surgery 3 years ago - more pain now  - some worry about memory in the house, more micorwave issues "  -     From 2/19/20  -Rytary 145 1 tab TID [id number].  Lasting 4-5 hours before noticing effect wears off.    -Dyskinesias continue, trying amantidine 100 mg Qdaily with about 70% relief.  However, significantly worsens with stress.  -Continues on azilect, tolerating well  -Last fall, September 2019.  Using walker without further falls.    -Lots of stress at work with new boss.    -Memory loss - forgetting names of kids, best friends.  -Describes stabbing pain in R buttocks, no radiation.  Worse in AM.  Trying stretching, massage.  Feels similar pain to prior when she had R hip arthroplasty.      From 11/2019  - still having the Rytary dip  - more dystonia of the R side affecting gait  - not happy with her doing worse  - out of rytary 95s  - more freezing when off  - some ICD with shopping and eating and gambling    June 2019:  - last seen last year  - more off time, better stronger " "percieved at 3 hours ("Rytary dip")  - takes 1/2 tab cd/ld at 1 hr, then she gets dyskinetic  - no new tremor  - more dy skinesia  - cog ok  - mood stable  - no new bowel or bladder issues    From May 2018:   - 3 hip surgeries, last one was fully replaced R side on May 23rd.  Had some wacky dreams in the post-op ion January, better now.  Previous plate screws in R hip not working, then they added a amelie, and failed as well with poor healing.  Then replaced the whole thing.  Feels well overall now.     - now with a bit of pain but ok  - less dyskinesia now  -adds a little IR in the am (1/4 to 1/2) to boost her on      From Aug 2017:  - taking rytary 145 1 tab tid plus adds in cd/ld 25/100 1/4 tabs as needed  - doing well overall  - feels gait si a bit crooked  - still fairly brittle with dyskinesia  - declines dbs or duopa  - might be good candidate for neuroderm patch study  - dx with jose schuster of hand      From 6/2016 with Al[facility]:  Dyskinesias are not bad until she gets anxious. Mainly notes in the RUE/ RLE.  Tends to note dyskinesias in the evening around 7 to 7:30 PM, which annoys her as she is home and eating supper and wants to relax. She mentions that if could choose to have tremor or dyskinesias, she'd choose tremor.     She takes Rytary at 7:30- 12:30- 5:30. She uses 1/4 tab of cd/ld PRN tremor, stiffness, or slowness. On a bad day, she will use 1/4 tab up to TID. She has days where she does not require any extra doses.      Impulsive tendencies:  Eating, sex, gambling, and spending.    History of Present Illness (HPI):  48 yo patient with history of YOPD, previously seen by my colleague Dr. Burris, last visit one year ago.    Patient describes initial symptoms of R hand tremor, progressing with mariela and CW over the past 5-7 years.  Had ICD with MPX - now not taking.  Poorly compliant with CD/LD 25/100 and has tendency to use as needed.    Wants more stability and regular follow-up.    Nonmotor/Premotor " symptoms:  Hyposmia? Yes  RBD/sleep issues? Yes  Depression/anxiety? No  Constipation? No  Urinary issues? No  Sexual dysfunction? No  Orthostasis? No  Falls? No  Cognitive impairment? No  Psychoses? No  Pain? No    Per Dr. Burris's notes: 47 yo female with PD.  She takes Mirapex ER 0.75mg daily which has been titrated down due to her obsessive gambling problem. She was started on Sinemet 25/100 that she is taking 1 tablet twice daily. She is tolerating it well without major side effects, but does not think it is helpful at all for right hand tremor. She was previously on Artane with major improvement in symptoms but had to stop that due to forgetfulness and stuttering problems.  Currently she states that her symptoms are worse since March when she stopped Artane. She is having trouble with writing and balance.  Her sleep is fine and has not had any falls. Her gambling problem is improved according to her, but still compulsive 1/week.     ROS:  Review of Systems   Constitutional:  Negative for fever, malaise/fatigue and weight loss.   HENT:  Negative for congestion and hearing loss.    Eyes:  Negative for blurred vision and double vision.   Respiratory:  Negative for cough, shortness of breath and stridor.    Cardiovascular:  Negative for chest pain, palpitations and leg swelling.   Gastrointestinal:  Negative for constipation, nausea and vomiting.   Genitourinary:  Negative for dysuria, frequency and urgency.   Musculoskeletal:  Positive for falls. Negative for myalgias.   Skin:  Negative for rash.   Neurological:  Positive for tremors. Negative for speech change, focal weakness, seizures and headaches.   Endo/Heme/Allergies:  Does not bruise/bleed easily.   Psychiatric/Behavioral:  Negative for depression, hallucinations and memory loss. The patient is not nervous/anxious.    Past Medical History: The patient  has a past medical history of Anxiety, Hypertension, Mitral valve prolapse syndrome, Other chronic  "sinusitis, Parkinson disease (2004), and PONV (postoperative nausea and vomiting).    Social History: The patient  reports that she has never smoked. She has never used smokeless tobacco. She reports current alcohol use of about 1.0 standard drink per week.    Family History: Their family history includes Coronary artery disease in her father; Diabetes in her father and mother; Hypertension in her mother; Neuropathy in her mother.    Allergies: No known drug allergies     Meds:   Current Outpatient Medications on File Prior to Visit   Medication Sig Dispense Refill    ALPRAZolam (XANAX XR) 0.5 MG Tb24 Take by mouth once daily.      ALPRAZolam (XANAX) 0.5 MG tablet Take 0.25-0.5 mg by mouth daily as needed.      amantadine HCL (SYMMETREL) 100 mg capsule TAKE 1 CAPSULE BY MOUTH TWICE A  capsule 3    EScitalopram oxalate (LEXAPRO) 10 MG tablet Take 10 mg by mouth once daily.      nebivolol (BYSTOLIC) 2.5 MG Tab Take 1 tablet (2.5 mg total) by mouth once daily. 5 tablet 0    rasagiline (AZILECT) 1 mg Tab TAKE 1 TABLET BY MOUTH EVERY DAY 90 tablet 3    RYTARY 36. mg CpSR TAKE BY MOUTH 1 CAPSULE 3 TIMES A  capsule 3    RYTARY 23.75-95 mg CpSR TAKE 1 CAPSULE BY MOUTH THREE TIMES A  capsule 3     No current facility-administered medications on file prior to visit.     Exam:  /66   Pulse 103   Ht 5' 6" (1.676 m)   Wt 58.6 kg (129 lb 1.3 oz)   LMP 12/28/2011   BMI 20.83 kg/m²     ON EXAM  * Specialized movement exam  Normal speech.   No SWJs  No ataxia   Mild facial masking.   R SEVERE bradykinesia while OFF on R, Mod on L.      ? Finger taps Finger flicks ARTHUR Heel taps   Left 1+ 1+ 1+ 1+   Right 2+ 1+ 1+ 3+   Neck tone: nl  ? Arm Leg   Left 1+ 1+   Right 2+ 2+       Intermittent modest dyskinesias of RUE>LUE, BLE, neck    No other chorea, athetosis, myoclonus, or tics.   No motor impersistence.   Gait is normal-based and steady with good stride length        Laboratory/Radiological:  - " Results:  - Independent review of images: MRI normal 2011    Problem List Items Addressed This Visit          Neuro    PD (Parkinson's disease) - Primary (Chronic)    Current Assessment & Plan     YOPD with dyskinesias  Potentially a good DBS candidate  We dicussed in detail her goals and expectations for DBS  We dicussed that this therapy could allow her to decrease dyskinesia, dystonia, come off amantadine, and most importantly better ONtime  At this point she may do well with either STN or GPI targets with slight preference for GPI given memory issues    Suggetsed ON/OFF eval  Neuropsych re-eval  Neurosurgery eval           Relevant Orders    Ambulatory consult to Neuropsychology         Ariella Rao MD, MS  [facility]  Department of Neurology  Movement Disorders

## 2022-11-15 ENCOUNTER — TELEPHONE (OUTPATIENT)
Dept: NEUROLOGY | Facility: CLINIC | Age: 57
End: 2022-11-15
Payer: COMMERCIAL

## 2022-11-16 NOTE — TELEPHONE ENCOUNTER
Spoke with Poonam to update on limited options for in person follow up with Dr. Padgett. She considered meeting with Alida tomorrow but has a conflict. She updated on status of her [employer] application as well.      - - -    Macey was disqualified due to a prior fracture.

## 2022-12-14 ENCOUNTER — OFFICE VISIT (OUTPATIENT)
Dept: PULMONOLOGY | Facility: CLINIC | Age: 57
End: 2022-12-14
Payer: COMMERCIAL

## 2022-12-14 VITALS
OXYGEN SATURATION: 97 % | BODY MASS INDEX: 20.76 KG/M2 | HEIGHT: 66 IN | SYSTOLIC BLOOD PRESSURE: 124 MMHG | WEIGHT: 129.19 LBS | HEART RATE: 99 BPM | DIASTOLIC BLOOD PRESSURE: 72 MMHG

## 2022-12-14 DIAGNOSIS — K21.9 GASTROESOPHAGEAL REFLUX DISEASE, UNSPECIFIED WHETHER ESOPHAGITIS PRESENT: ICD-10-CM

## 2022-12-14 DIAGNOSIS — J32.9 CHRONIC RHINOSINUSITIS: ICD-10-CM

## 2022-12-14 DIAGNOSIS — G20.A1 PD (PARKINSON'S DISEASE): ICD-10-CM

## 2022-12-14 DIAGNOSIS — R06.00 DYSPNEA, UNSPECIFIED TYPE: ICD-10-CM

## 2022-12-14 DIAGNOSIS — J98.4 APICAL LUNG SCARRING: ICD-10-CM

## 2022-12-14 DIAGNOSIS — R05.3 CHRONIC COUGH: Primary | ICD-10-CM

## 2022-12-14 DIAGNOSIS — R53.83 FATIGUE, UNSPECIFIED TYPE: ICD-10-CM

## 2022-12-14 PROCEDURE — 99499 RISK ADDL DX/OHS AUDIT: ICD-10-PCS | Mod: S$GLB,,, | Performed by: INTERNAL MEDICINE

## 2022-12-14 PROCEDURE — 99999 PR PBB SHADOW E&M-EST. PATIENT-LVL V: CPT | Mod: PBBFAC,,, | Performed by: INTERNAL MEDICINE

## 2022-12-14 PROCEDURE — 99204 PR OFFICE/OUTPT VISIT, NEW, LEVL IV, 45-59 MIN: ICD-10-PCS | Mod: S$GLB,,, | Performed by: INTERNAL MEDICINE

## 2022-12-14 PROCEDURE — 99999 PR PBB SHADOW E&M-EST. PATIENT-LVL V: ICD-10-PCS | Mod: PBBFAC,,, | Performed by: INTERNAL MEDICINE

## 2022-12-14 PROCEDURE — 99499 UNLISTED E&M SERVICE: CPT | Mod: S$GLB,,, | Performed by: INTERNAL MEDICINE

## 2022-12-14 PROCEDURE — 99204 OFFICE O/P NEW MOD 45 MIN: CPT | Mod: S$GLB,,, | Performed by: INTERNAL MEDICINE

## 2022-12-14 RX ORDER — AZELASTINE 1 MG/ML
1 SPRAY, METERED NASAL 2 TIMES DAILY
Qty: 30 ML | Refills: 0 | Status: SHIPPED | OUTPATIENT
Start: 2022-12-14 | End: 2023-01-17

## 2022-12-14 NOTE — PROGRESS NOTES
Subjective:   Patient ID: Tika Navarro is a 56 y.o. female    Chief Complaint:   Chief Complaint   Patient presents with    Cough    Abnormal Ct Scan       Referred by PCP from .    HPI  Tika Navarro is a 56 y.o. female who comes for evaluation of  cough and abnormal CT.  History of HTN, anxiety, right hip replacement MVP. Has right hip pain. Has Parkinson's Disease.    Has a cough that is dry started around January 2022. Does not remember any precipitating factors. Occurs 1-2 x times a day. No temporal relation to time of day or location. Appears to occur often with eating gritty/dry meals and with peppery meals. Does have sinus congestion with post nasal drip with need to clear throat in the mornings. Denies wheezing, chest tightness or pain. Does not take any ACEi. Use saline drip nightly.    Reports dyspnea with walking. Feels more like fatigue. Tired with doing chores, washing clothes and doing dishes. Need to sit to rest. A few minutes and back again to movement. Does not do any exercise. Had hip replacement 3 years ago.  Able to walk around the mall while shopping      Does not smoke.  Denies any asthma. No pneumonia. Had bronchitis regularly in the past.    Patient has no previous hospitalizations for dyspnea.      Social Hx:  Born and raised in [place], no prolonged foreign travels, no sick contacts, no incarceration.  Smoking hx: never smoker  Does have second hand/passive smoking exposure.    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  There is atheromatous disease of the aorta and coronary arteries.  No pathologic mediastinal or axillary lymphadenopathy.  Bilateral apical pleural and parenchymal scarring is present.  There is a 3 mm calcified granuloma within the right major fissure. No infiltrates or suspicious nodules are present.  No pleural effusion or pneumothorax.  Dextroscoliosis  thoracic spine is present.  Endplate spondylosis of the upper thoracic spine noted.  No infiltrate bone lesions.  Limited views of the upper abdomen are unremarkable.     IMPRESSION:   Bilateral apical pleural-parenchymal scarring.  Calcified granuloma right lung.       Biapical right apical pleural-parenchymal thickening, scarring or infiltrate, grossly similar when compared to the previous examination.     Objective:   Vitals reviewed   Physical Exam  Constitutional:       Appearance: Normal appearance.   HENT:      Head: Normocephalic and atraumatic.   Cardiovascular:      Rate and Rhythm: Normal rate and regular rhythm.      Pulses: Normal pulses.      Heart sounds: Normal heart sounds.   Pulmonary:      Effort: Pulmonary effort is normal. No respiratory distress.      Breath sounds: No wheezing or rales.   Musculoskeletal:         General: Normal range of motion.      Cervical back: Normal range of motion.   Neurological:      General: No focal deficit present.      Mental Status: She is alert and oriented to person, place, and time.      Comments: Tremors of b/l UE       Assessment:     Problem List Items Addressed This Visit          Neuro    PD (Parkinson's disease) (Chronic)     Other Visit Diagnoses       Chronic cough    -  Primary    Relevant Medications    azelastine (ASTELIN) 137 mcg (0.1 %) nasal spray    Other Relevant Orders    Fl Modified Barium Swallow Speech    SLP video swallow    Spirometry with/without bronchodilator    Chronic rhinosinusitis        Relevant Medications    azelastine (ASTELIN) 137 mcg (0.1 %) nasal spray    Gastroesophageal reflux disease, unspecified whether esophagitis present        Apical lung scarring        Dyspnea, unspecified type        Fatigue, unspecified type                  Plan:       1. Chronic cough  2. Chronic rhinosinusitis  Suspect related to combination of rhinitis and post nasal drip and GERD. No prior PFT on record  With Parkinson's, she may have some  difficulty with swallowing certain foods, clearly with dry food  Fluticasone nasal spray each nostril twice a day  Azelastine nasal spray each nostril twice a day  Do both for two weeks, then once a day for a week or two, then stop Azelastine  - Fl Modified Barium Swallow Speech; Future  - SLP video swallow; Future  - Spirometry with/without bronchodilator; Future    3. Gastroesophageal reflux disease, unspecified whether esophagitis present  - Try Elevation of the head of the bed.  - Refrain from supine position after meals  - Avoidance of meals two to three hours before bedtime  - avoidance or elimination of dietary triggers (caffeine, chocolate, spicy foods, food with high fat content, carbonated beverages, and peppermint)    4. Apical lung scarring  Noticed on recent CT chest performed for evaluation of cough. No prior scans to review. No risk factors for tuberculosis. Likely benign  - will need a surveillance CT chest    5. Dyspnea, unspecified type  6. Fatigue, unspecified type  7. PD (Parkinson's disease)  Appears to be related to underlying PD and hip pain  - Will check Spirometry    Follow up 4-5 months

## 2022-12-14 NOTE — PATIENT INSTRUCTIONS
Fluticasone nasal spray each nostril twice a day  Azelastine nasal spray each nostril twice a day  Do both for two weeks, then once a day for a week or two, then stop Azelastine  Barium swallow study ordered  Pulmonary function test ordered

## 2022-12-20 ENCOUNTER — TELEPHONE (OUTPATIENT)
Dept: SPEECH THERAPY | Facility: HOSPITAL | Age: 57
End: 2022-12-20
Payer: COMMERCIAL

## 2023-01-04 ENCOUNTER — TELEPHONE (OUTPATIENT)
Dept: NEUROLOGY | Facility: CLINIC | Age: 58
End: 2023-01-04
Payer: COMMERCIAL

## 2023-01-04 ENCOUNTER — TELEPHONE (OUTPATIENT)
Dept: SPEECH THERAPY | Facility: HOSPITAL | Age: 58
End: 2023-01-04
Payer: COMMERCIAL

## 2023-01-04 NOTE — TELEPHONE ENCOUNTER
Jesus pt to schedule mbss 2/1@2pm. Informed to fast 2hrs before test, 2nd fl radiology. Main clinic states understanding.

## 2023-01-04 NOTE — TELEPHONE ENCOUNTER
----- Message from Anna Lewis sent at 1/4/2023  1:15 PM CST -----  Regarding: Pt Advice  Contact: self [phone]  Pt is calling questioning what other drs does she need to see or have seen for her DBS.  Please call.

## 2023-01-09 ENCOUNTER — TELEPHONE (OUTPATIENT)
Dept: NEUROLOGY | Facility: CLINIC | Age: 58
End: 2023-01-09
Payer: COMMERCIAL

## 2023-01-09 NOTE — TELEPHONE ENCOUNTER
----- Message from Jessica Ferrari MA sent at 1/9/2023 11:53 AM CST -----  Contact: [phone]  Patient is returning missed phone call from Ajit. Please call pt back at [phone].

## 2023-01-09 NOTE — TELEPHONE ENCOUNTER
Returned patient's call - She moved all her movement appts to February. She is scheduled with Dr. Rao, Dr. Padgett, and Dr. Monroy all in February and wanted to verify if there were any other providers she needed to see before getting the DBS surgery. I informed her, to my knowledge there are no other providers she needs to be evaluated by, but will double check and reach out if there is something else that needs to be done, otherwise she is all set for her appts.

## 2023-02-01 ENCOUNTER — CLINICAL SUPPORT (OUTPATIENT)
Dept: SPEECH THERAPY | Facility: HOSPITAL | Age: 58
End: 2023-02-01
Attending: INTERNAL MEDICINE
Payer: MEDICARE

## 2023-02-01 ENCOUNTER — HOSPITAL ENCOUNTER (OUTPATIENT)
Dept: RADIOLOGY | Facility: HOSPITAL | Age: 58
Discharge: HOME | End: 2023-02-01
Attending: INTERNAL MEDICINE
Payer: MEDICARE

## 2023-02-01 DIAGNOSIS — R05.3 CHRONIC COUGH: ICD-10-CM

## 2023-02-01 DIAGNOSIS — G20.A1 PD (PARKINSON'S DISEASE): ICD-10-CM

## 2023-02-01 DIAGNOSIS — R13.12 DYSPHAGIA, OROPHARYNGEAL: Primary | ICD-10-CM

## 2023-02-01 PROCEDURE — 74230 X-RAY XM SWLNG FUNCJ C+: CPT | Mod: TC

## 2023-02-01 PROCEDURE — 25500020 PHARM REV CODE 255: Performed by: INTERNAL MEDICINE

## 2023-02-01 PROCEDURE — 92611 MOTION FLUOROSCOPY/SWALLOW: CPT | Mod: GN | Performed by: SPEECH-LANGUAGE PATHOLOGIST

## 2023-02-01 PROCEDURE — 74230 X-RAY XM SWLNG FUNCJ C+: CPT | Mod: 26,,, | Performed by: RADIOLOGY

## 2023-02-01 PROCEDURE — A9698 NON-RAD CONTRAST MATERIALNOC: HCPCS | Performed by: INTERNAL MEDICINE

## 2023-02-01 PROCEDURE — 74230 FL MODIFIED BARIUM SWALLOW SPEECH STUDY: ICD-10-PCS | Mod: 26,,, | Performed by: RADIOLOGY

## 2023-02-01 RX ADMIN — BARIUM SULFATE 100 ML: 0.81 POWDER, FOR SUSPENSION ORAL at 01:02

## 2023-02-02 NOTE — PLAN OF CARE
IMPRESSIONS:   This 57 y.o. woman appears to present with  1.  Oropharyngoesophageal phases of swallowing within functional limits for all consistencies and the barium tablet.  There were subtle differences (trace residue with thin liquids, vallecular stasis with barium tablet) suggesting mild reduced pharyngeal contraction, but these were readily resolved with a dry or liquid swallow.  We were unable to elicit a cough response as she has described, even with dry radha cracker covered in barium powder.   2.  Likely multi-factorial picture that may account for her reported patterns of coughing:   A.  Xerostomia.  She was unsure whether this may be related to a medication she is taking.   B.  GERD with recent onset of heartburn.     C.  PD.        RECOMMENDATIONS/PLAN OF CARE:   It is felt that Ms. Navarro would benefit from  1.  Continuation of her current regular consistency diet as tolerated with thin liquids using the following strategies and common aspiration precautions, including, but not limited to  A.  Appropriate upright seating for all eating and drinking.  B.  Continued use of straw as needed.  C.  Continued use of small bites, thoroughly masticated.  D.  Prepare foods to moist, tender state and/or add moisture via sauces, gravies, jon, spreads,, dips, etc., to bind inherently dry foods.  E.  Continue pill medications with liquids.  Consider embedding pills in puree as needed.  F.  Monitoring for any signs/symptoms of aspiration (such as wet/gurgly voice that does not clear with coughing, inability to make any voice sounds, any persistent coughing with oral intake, otherwise unexplained fever, unexplained increased or new difficulty or discomfort breathing, unexplained increase in sleepiness/lethargy/significant fatigue, unexplained increase or new onset confusion or change in cognitive functioning, or any other unexplained change in health or well-being that could be related to swallowing).  2.   Monitor weight.  3.  Consider consultation with GI due to recent onset of heartburn.    4.  Adhere to recommended GERD precautions per Dr. Ibrahim, including,   A.  Elevating head of bed.   B.  Avoid eating for 2-3 hours prior to going to bed for the night.   C.  Determine and avoid dietary triggers (may include, but not be limited to caffeine, chocolate, spicy foods, foods with high fat content, carbonated beverages, and peppermint).   5.  If voice changes persist despite GERD management, consider referral to Dr. Huey Dueñas, laryngologist, and ST for voice evaluation.  6.  Follow up with Shirlene Ibrahim and Jalen as well as PCP as directed.  7.  Repeat MBSS as needed, particularly if there is any worsening of swallow function or concerns for aspiration-related illness.

## 2023-02-02 NOTE — PROGRESS NOTES
"MODIFIED BARIUM SWALLOW STUDY    REASON FOR REFERRAL:  Tika Navarro, age 57, was referred by Dr. Benjamin Ibrahim, pulmonologist, for a Modified Barium Swallow Study to rule out aspiration, assess her overall swallowing function, and determine safest consistencies for oral intake.  She was unaccompanied.    Ms. Navarro reported that she was referred to a pulmonologist after some "shadows" were noted on imaging.  She reported that they were considered possible evidence or previous aspiration or other condition.  She reported a history of daily coughing episodes in which she sometimes can't catch her breath. This particularly occurs when she is eating inherently dry foods (e.g., crackers, bread, chips).   She also reported dry mouth and waking at night with coughing.  She has a history of GERD with recent onset of sensing heartburn and reported that spicy foods are a problem. She denied that she has seen a gastroenterologist. Dr. Ibrahim provided recommendations for environmental and dietary precautions, but it was not clear that Ms. Navarro has attempted to utilize those consistently.  She also has PD.    She reported unintentional weight loss beginning September 2022 and stabilizing over the past 3 months.  She reported change from 139 to 127 lbs. She denied history of pna or need for Heimlich.   She reported inconsistently raspy voice with dryness.     She reported a stiff neck related to her PD and the site where she normally experiences stress-related muscle tension.  Due to this, she finds it difficult to drink from a bottle or open cup, so uses a straw to drink.    Ms. Navarro also has a history of hip replacement about 3 years ago and continues to have issues with her gait and transitioning when sitting/standing.      MEDICAL HISTORY:  Past Medical History:   Diagnosis Date    Anxiety     Hypertension     Mitral valve prolapse syndrome     Other chronic sinusitis     Parkinson disease 2004    Right tremor type    " PONV (postoperative nausea and vomiting)         SURGICAL HISTORY:  Past Surgical History:   Procedure Laterality Date    BREAST SURGERY      TONSILLECTOMY, ADENOIDECTOMY         SWALLOWING HISTORY:  As above. Takes a regular consistency diet with thin liquids.  Takes medications with liquids.  Ms. Navarro reported that she takes small bites and chews well b/c she finds it easier to chew and swallow.    FAMILY HISTORY:  Family History   Problem Relation Age of Onset    Coronary artery disease Father     Diabetes Father     Hypertension Mother     Neuropathy Mother     Diabetes Mother     Parkinsonism Neg Hx     Breast cancer Neg Hx     Ovarian cancer Neg Hx         SOCIAL HISTORY:  Ms. Navarro lives in [facility].       Tobacco:  Never smoker per EMR.  ETOH:  1 drink/week per EMR.    BEHAVIOR:  Ms. Navarro was a very pleasant woman who had normal affect and social interaction. She ambulated independently with an altered gait.  She needed to hold something to sit and requested an assist from clinician to help her rise from the stool after conclusion of the study. She related these differences to the functioning of her hip. During the study, she was somewhat mobile.  She reported that she was near the time for her next PD medication and endorsed that it was typical for her to have more movement at this point in her medication cycle.  She also appeared to move volitionally at times in an effort to help progress a bolus.  She was able to fully cooperate during the study.  Results of today's assessment were considered indicative of her current levels of swallowing functioning.      HEARING:  Subjectively, within normal limits.     ORAL PERIPHERAL:   Informal examination of the oral mechanism revealed structures and functioning within normal limits for swallowing and speech purposes.  Speech was 100% intelligible with no distortions.    Voice quality was within normal limits initially, but noted as raspy after she had been  speaking for several minutes.  There was no wet quality before, during, or after the study.      TEST FINDINGS:   Ms. Navarro was seen in Radiology with the Radiologist for a Modified Barium Swallow Study.  She was seated on a stool for a left lateral videofluoroscopic view.      Consistencies assessed using radiopaque barium contrast:  thin (single and continuous swallows via straw),   thin puree (1-teaspoon boluses) via spoon,   thick puree (1-teaspoon boluses) via spoon,   solid (1/4 to 1/2 cracker boluses, 2 with barium pudding, 2 with barium powder) by Ms. Navarro's hand, and   13 mm barium tablet with water.    Strategies:  Liquid wash -- help progress barium tablet when it had stasis in valleculae.    Phases:  Oral:  Ms. Navarro was able to obtain liquid and strip utensils adequately with no loss of material from the oral cavity.  She moved boluses through the oral cavity with appropriate transit time.  There was no pooling of liquids in the mouth.  Swallow reflex was triggered after small volumes of liquids were spilled to the valleculae and pyriforms and solids within normal limits.    Pharyngeal:  Boluses moved through the pharyngeal phase with no laryngeal penetration and no aspiration and no nasal regurgitation.     - Delayed initiation for liquids as above.  - Adequate soft palate elevation  - Adequate laryngeal elevation and anterior hyoid excursion  - Adequate tongue base retraction  - Complete epiglottic inversion  - Complete laryngeal vestibular closure  - Adequate pharyngeal stripping wave  - Adequate PE segment opening.  -   Very trace  pharyngeal residue in BOT and valleculae with thin liquids.  There was stasis of the barium tablet in the valleculae, but this clear with 1 subsequent liquid bolus.    Cervical Esophageal:  Boluses entered the upper esophagus within normal limits.  While no obstruction was noted, a small prominence consistent with a cricopharyngeal bar was noted about C4.       Rosenbeck 8-point Penetration-Aspiration Scale:  1 - Material does not enter airway.      IMPRESSIONS:   This 57 y.o. woman appears to present with  1.  Oropharyngoesophageal phases of swallowing within functional limits for all consistencies and the barium tablet.  There were subtle differences (trace residue with thin liquids, vallecular stasis with barium tablet) suggesting mild reduced pharyngeal contraction, but these were readily resolved with a dry or liquid swallow.  We were unable to elicit a cough response as she has described, even with dry radha cracker covered in barium powder.   2.  Likely multi-factorial picture that may account for her reported patterns of coughing:   A.  Xerostomia.  She was unsure whether this may be related to a medication she is taking.   B.  GERD with recent onset of heartburn.     C.  PD.        RECOMMENDATIONS/PLAN OF CARE:   It is felt that Ms. Navarro would benefit from  1.  Continuation of her current regular consistency diet as tolerated with thin liquids using the following strategies and common aspiration precautions, including, but not limited to  A.  Appropriate upright seating for all eating and drinking.  B.  Continued use of straw as needed.  C.  Continued use of small bites, thoroughly masticated.  D.  Prepare foods to moist, tender state and/or add moisture via sauces, gravies, jon, spreads,, dips, etc., to bind inherently dry foods.  E.  Continue pill medications with liquids.  Consider embedding pills in puree as needed.  F.  Monitoring for any signs/symptoms of aspiration (such as wet/gurgly voice that does not clear with coughing, inability to make any voice sounds, any persistent coughing with oral intake, otherwise unexplained fever, unexplained increased or new difficulty or discomfort breathing, unexplained increase in sleepiness/lethargy/significant fatigue, unexplained increase or new onset confusion or change in cognitive functioning, or any other  unexplained change in health or well-being that could be related to swallowing).  2.  Monitor weight.  3.  Consider consultation with GI due to recent onset of heartburn.    4.  Adhere to recommended GERD precautions per Dr. Ibrahim, including,   A.  Elevating head of bed.   B.  Avoid eating for 2-3 hours prior to going to bed for the night.   C.  Determine and avoid dietary triggers (may include, but not be limited to caffeine, chocolate, spicy foods, foods with high fat content, carbonated beverages, and peppermint).   5.  If voice changes persist despite GERD management, consider referral to Dr. Huey Dueñas, laryngologist, and ST for voice evaluation.  6.  Follow up with Shirlene Ibrahim and Jalen as well as PCP as directed.  7.  Repeat MBSS as needed, particularly if there is any worsening of swallow function or concerns for aspiration-related illness.

## 2023-02-13 ENCOUNTER — OFFICE VISIT (OUTPATIENT)
Dept: NEUROLOGY | Facility: CLINIC | Age: 58
End: 2023-02-13
Payer: COMMERCIAL

## 2023-02-13 VITALS
HEART RATE: 71 BPM | BODY MASS INDEX: 21.17 KG/M2 | HEIGHT: 66 IN | WEIGHT: 131.75 LBS | SYSTOLIC BLOOD PRESSURE: 112 MMHG | DIASTOLIC BLOOD PRESSURE: 75 MMHG

## 2023-02-13 DIAGNOSIS — G20.A1 PARKINSON DISEASE: Primary | ICD-10-CM

## 2023-02-13 PROCEDURE — 99999 PR PBB SHADOW E&M-EST. PATIENT-LVL III: ICD-10-PCS | Mod: PBBFAC,,, | Performed by: PSYCHIATRY & NEUROLOGY

## 2023-02-13 PROCEDURE — 99214 PR OFFICE/OUTPT VISIT, EST, LEVL IV, 30-39 MIN: ICD-10-PCS | Mod: S$GLB,,, | Performed by: PSYCHIATRY & NEUROLOGY

## 2023-02-13 PROCEDURE — 99214 OFFICE O/P EST MOD 30 MIN: CPT | Mod: S$GLB,,, | Performed by: PSYCHIATRY & NEUROLOGY

## 2023-02-13 PROCEDURE — 99999 PR PBB SHADOW E&M-EST. PATIENT-LVL III: CPT | Mod: PBBFAC,,, | Performed by: PSYCHIATRY & NEUROLOGY

## 2023-02-13 NOTE — PROGRESS NOTES
Name: Tika Navarro  MRN: [id number]   CSN: [id number]      Date: 02/13/2023      Chief Complaint: pd     Interval History:  - back after  8 months  - has been doing pretty well  - happy ewith meds  - not bothered too much by dyskinesia, but would consider DBS  - off time is also present, and some fluctuations that are bothersome  - feels cognition is good in general, but wants to be proactive      From June 2022  - amantadine caused livedo, she's generally ok with   - more dyskinesia and dystonia of R arm and leg  - some falls, usually happens with a trip but more dramatic than if she did not have PD  - no big injuries, feels her vital proteins are helping  - gambling is still an issue, playing video [employer] machines - at the one place she goes, but has had wins and losses of $1000+ at a time  - eating healthy, sex drive is higher than her partner, says it is ok with their relationship  - NOW is finally interested in DBS, not intersted in any med changes at this time    From 9/30/21:  - 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, reduced down to only 1 tab a day and the reti remains  - rytary 95 TID - if higher, gets back dyskinesia  - getting PT TID, at [place] on [facility]  - lost about 6 lbs, weighs about 130  - really feeling only slightly off at times  - willing to try 145s  - enjoying correction    2021:  - feeling more progression, more into the left  - more left hand tremor  - feels rytary wearing off, in the dip at 3-4 hours  - tingling in fingers and toes are periodically  - R hip is tighter than ever, post surgery 3 years ago - more pain now  - some worry about memory in the house, more micorwave issues   -     From 2/19/20  -Rytary 145 1 tab TID [id number].  Lasting 4-5 hours before noticing effect wears off.    -Dyskinesias continue, trying amantidine 100 mg Qdaily with about 70% relief.  However, significantly worsens with stress.  -Continues on azilect, tolerating well  -Last fall, September 2019.   "Using walker without further falls.    -Lots of stress at work with new boss.    -Memory loss - forgetting names of kids, best friends.  -Describes stabbing pain in R buttocks, no radiation.  Worse in AM.  Trying stretching, massage.  Feels similar pain to prior when she had R hip arthroplasty.      From 11/2019  - still having the Rytary dip  - more dystonia of the R side affecting gait  - not happy with her doing worse  - out of rytary 95s  - more freezing when off  - some ICD with shopping and eating and gambling    June 2019:  - last seen last year  - more off time, better stronger percieved at 3 hours ("Rytary dip")  - takes 1/2 tab cd/ld at 1 hr, then she gets dyskinetic  - no new tremor  - more dy skinesia  - cog ok  - mood stable  - no new bowel or bladder issues    From May 2018:   - 3 hip surgeries, last one was fully replaced R side on May 23rd.  Had some wacky dreams in the post-op ion January, better now.  Previous plate screws in R hip not working, then they added a amelie, and failed as well with poor healing.  Then replaced the whole thing.  Feels well overall now.     - now with a bit of pain but ok  - less dyskinesia now  -adds a little IR in the am (1/4 to 1/2) to boost her on      From Aug 2017:  - taking rytary 145 1 tab tid plus adds in cd/ld 25/100 1/4 tabs as needed  - doing well overall  - feels gait si a bit crooked  - still fairly brittle with dyskinesia  - declines dbs or duopa  - might be good candidate for neuroderm patch study  - dx with jose schuster of hand      From 6/2016 with Alisia:  Dyskinesias are not bad until she gets anxious. Mainly notes in the RUE/ RLE.  Tends to note dyskinesias in the evening around 7 to 7:30 PM, which annoys her as she is home and eating supper and wants to relax. She mentions that if could choose to have tremor or dyskinesias, she'd choose tremor.     She takes Rytary at 7:30- 12:30- 5:30. She uses 1/4 tab of cd/ld PRN tremor, stiffness, or slowness. On a " bad day, she will use 1/4 tab up to TID. She has days where she does not require any extra doses.      Impulsive tendencies:  Eating, sex, gambling, and spending.    History of Present Illness (HPI):  48 yo patient with history of YOPD, previously seen by my colleague Dr. Burris, last visit one year ago.    Patient describes initial symptoms of R hand tremor, progressing with mariela and CW over the past 5-7 years.  Had ICD with MPX - now not taking.  Poorly compliant with CD/LD 25/100 and has tendency to use as needed.    Wants more stability and regular follow-up.    Nonmotor/Premotor symptoms:  Hyposmia? Yes  RBD/sleep issues? Yes  Depression/anxiety? No  Constipation? No  Urinary issues? No  Sexual dysfunction? No  Orthostasis? No  Falls? No  Cognitive impairment? No  Psychoses? No  Pain? No    Per Dr. Burris's notes: 45 yo female with PD.  She takes Mirapex ER 0.75mg daily which has been titrated down due to her obsessive gambling problem. She was started on Sinemet 25/100 that she is taking 1 tablet twice daily. She is tolerating it well without major side effects, but does not think it is helpful at all for right hand tremor. She was previously on Artane with major improvement in symptoms but had to stop that due to forgetfulness and stuttering problems.  Currently she states that her symptoms are worse since March when she stopped Artane. She is having trouble with writing and balance.  Her sleep is fine and has not had any falls. Her gambling problem is improved according to her, but still compulsive 1/week.     ROS:  Review of Systems   Constitutional:  Negative for malaise/fatigue and weight loss.   HENT:  Negative for hearing loss.    Eyes:  Negative for blurred vision and double vision.   Respiratory:  Negative for shortness of breath and stridor.    Cardiovascular:  Negative for chest pain and palpitations.   Gastrointestinal:  Negative for constipation, nausea and vomiting.   Genitourinary:  Negative for  frequency and urgency.   Musculoskeletal:  Negative for falls and myalgias.   Skin:  Negative for rash.   Neurological:  Positive for tremors. Negative for focal weakness and seizures.   Endo/Heme/Allergies:  Does not bruise/bleed easily.   Psychiatric/Behavioral:  Negative for depression, hallucinations and memory loss. The patient is not nervous/anxious.    Past Medical History: The patient  has a past medical history of Anxiety, Hypertension, Mitral valve prolapse syndrome, Other chronic sinusitis, Parkinson disease (2004), and PONV (postoperative nausea and vomiting).    Social History: The patient  reports that she has never smoked. She has never used smokeless tobacco. She reports current alcohol use of about 1.0 standard drink per week.    Family History: Their family history includes Coronary artery disease in her father; Diabetes in her father and mother; Hypertension in her mother; Neuropathy in her mother.    Allergies: No known drug allergies     Meds:   Current Outpatient Medications on File Prior to Visit   Medication Sig Dispense Refill    ALPRAZolam (XANAX XR) 0.5 MG Tb24 Take by mouth once daily.      ALPRAZolam (XANAX) 0.5 MG tablet Take 0.25-0.5 mg by mouth daily as needed.      amantadine HCL (SYMMETREL) 100 mg capsule TAKE 1 CAPSULE BY MOUTH TWICE A  capsule 3    azelastine (ASTELIN) 137 mcg (0.1 %) nasal spray SPRAY 1 SPRAY BY NASAL ROUTE 2 TIMES DAILY. 30 mL 1    EScitalopram oxalate (LEXAPRO) 10 MG tablet Take 10 mg by mouth once daily.      nebivolol (BYSTOLIC) 2.5 MG Tab Take 1 tablet (2.5 mg total) by mouth once daily. 5 tablet 0    rasagiline (AZILECT) 1 mg Tab TAKE 1 TABLET BY MOUTH EVERY DAY 90 tablet 3    RYTARY 23.75-95 mg CpSR TAKE 1 CAPSULE BY MOUTH THREE TIMES A  capsule 3    RYTARY 36. mg CpSR TAKE BY MOUTH 1 CAPSULE 3 TIMES A  capsule 3     No current facility-administered medications on file prior to visit.     Exam:  [facility] 12/28/2011     OFF EXAM  *  Specialized movement exam  Normal speech.    Mild facial masking.   R SEVERE bradykinesia while OFF on R, Mod on L.    Intermittent resting tremor of R hand, worse when off.  Some R foot dystonia, enhanced with gait - present in .    Intermittent dyskinesias of RUE>LUE, BLE    No other chorea, athetosis, myoclonus, or tics.   No motor impersistence.   Gait is normal-based and steady with good stride length      Laboratory/Radiological:  - Results:  - Independent review of images: MRI normal     Diagnoses:          1) Early onset PD, more progressive dyskinesia and motor fluctuations.  On Azilect, Rytary, and Amantadine 100 mg daily.  2) R hip pain - stable.  Reviewed xray again, got steroids earlier  3) New L wrist pain after fall.  Hurts along the edge of the radius, perhaps in anatomic snuffbox - query small crack vs scaphoid/navicular injury.  3) Memory loss - likely some contributions from anxiety    Medical Decision Makin) continue to work on continuous dopaminergic stimulation  2) dbs evaluation!            Luis Enrique Padgett MD, MPH  Division of Movement and Memory Disorders  [facility]  [phone]

## 2023-02-23 ENCOUNTER — OFFICE VISIT (OUTPATIENT)
Dept: NEUROLOGY | Facility: CLINIC | Age: 58
End: 2023-02-23
Payer: MEDICARE

## 2023-02-23 DIAGNOSIS — G20.A1 MILD NEUROCOGNITIVE DISORDER DUE TO PARKINSON'S DISEASE: Primary | Chronic | ICD-10-CM

## 2023-02-23 DIAGNOSIS — F06.70 MILD NEUROCOGNITIVE DISORDER DUE TO PARKINSON'S DISEASE: Primary | Chronic | ICD-10-CM

## 2023-02-23 DIAGNOSIS — F41.1 GENERALIZED ANXIETY DISORDER: Chronic | ICD-10-CM

## 2023-02-23 PROCEDURE — 90791 PSYCH DIAGNOSTIC EVALUATION: CPT | Mod: 95,,, | Performed by: CLINICAL NEUROPSYCHOLOGIST

## 2023-02-23 PROCEDURE — 99499 NO LOS: ICD-10-PCS | Mod: 95,,, | Performed by: CLINICAL NEUROPSYCHOLOGIST

## 2023-02-23 PROCEDURE — 90791 PR PSYCHIATRIC DIAGNOSTIC EVALUATION: ICD-10-PCS | Mod: 95,,, | Performed by: CLINICAL NEUROPSYCHOLOGIST

## 2023-02-23 PROCEDURE — 99499 UNLISTED E&M SERVICE: CPT | Mod: 95,,, | Performed by: CLINICAL NEUROPSYCHOLOGIST

## 2023-02-23 NOTE — PROGRESS NOTES
NEUROPSYCHOLOGY CONSULT - TELE-HEALTH    Referral Information  Name: Tika Navarro  MRN: [id number]  : 1965  Age: 57 y.o.  Race: White  Gender: female  Referring Provider: Ariella Rao Md  [address] [place] [address]  Billing: See below for details as coding/billing has changed   The chief complaint leading to consultation/medical necessity is: Cognitive sxs follow-up and pre-surgical evaluation for potential DBS  Visit type: Virtual Visit for 45-min    SUMMARY/TREATMENT PLAN   Results from interview indicate the following diagnoses and treatment plan recommendations. This was discussed with the patient.    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)    Current Assessment & Plan     Assessment:  -reports stable cognition since  neuropsych evaluation  -continues to have some trouble with frontal-subcortical cognitive skills and managing with coping strategies  -does have some likely problem gambling going on but she is trying to stop over Lent  Plan:  -cognitive status, provide differential diagnosis, update treatment plan particularly with recommendations for potential DBS surgery  -in particular will have little more in-depth assessment regarding her problem gambling              Psychiatric    Generalized anxiety disorder (Chronic)    Current Assessment & Plan     Assessment:  -stable on current SSRI and with reduced stress  Plan:  -monitor              HISTORY OF PRESENT ILLNESS   2020 Initial Neuropsych Eval  Neuro   PD (Parkinson's disease) (Chronic)   Current Assessment & Plan    -follows with movement disorders clinic        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     >>In particular, results showed largely mild and very circumscribed difficulty with frontal lobe cognitive skills - trouble with complex aspects of attention (e.g., working memory or holding information in her mind and quickly/accurately/reliably processing it; organizing information for later recall that results her being more forgetful) and problem solving when tasks are unfamiliar/pressured/unstructured. Motor speed/dexterity also remains quite impaired, as would be expected.  >>Fortunately, aside from mild and specific cognitive trouble previously noted, her other executive functions, visual memory, language skills, spatial skills, simple processing speed, and basic aspects of attention. She remains a very capable woman and strategies/accommodations should help her manage cognitive sxs.  -Etiology/Diagnosis:   >>Certainly, Parkinson's disease can result in decline in frontal-subcortical cognitive skills.   >>But, her depression/anxiety are also quite significant. While optimal treatment of psychiatric sxs may not fully improve her cognitive deficits, the severity of her sxs are such that significant improve may likely result in improved cognitive functions. This is good news!    Plan:  >>Neuropsych:  Will discuss findings and treatment plan with her in detail     >>Behavioral/Neuropsychiatric Symptoms:   Psychiatry/Medical Psychology: Consultation with psychiatry or a medical psychologist is suggested for a medication evaluation to treat generalized anxiety and depression.  Will discuss with her as if she had a difficult prior experience with another provider  Therapy:  She is a very good candidate for psychotherapy and will discuss treatment recommendation with her    >>Neurology: Continued follow-up in Movement Disorder Program          CURRENT SYMPTOMS     Cognitive Symptoms: Since 2020, no major changes in cognition. Stable mild trouble with processing speed, short-term memory memory, and complex  "attention (multi-tasking, shifting)    Current Functional Status/Needs:  ADLs  Self-Care Eating Safety Other   Independent: Independent Independent NA     Instrumental IADLs:   Driving Medications/Health Household Finances   -Independent -Independent -Independent -Independent     IADL 5/4/2020   Ability to Use Telephone Operates telephone on own initiative, looks up and dials numbers   Shopping Takes care of all shopping needs independently   Food Preparation Heats and serves prepared meals or prepares meals but does not maintain adequate diet   Housekeeping Performs light daily tasks such as dishwashing, bed making   Laundry Does personal laundry completely   Mode of Transportation Travels independently on public tranportation or drives own car   Responsibility for Own Medications Is responsible for taking medication in correct dosages at correct time   Ability to Handle Finances Manages financial matters independently (budgets, writes checks, pays rent and bills, goes to bank). Collects and keeps track of income     Psychiatric/Behavioral Symptoms:  Mood:  Depression/Dysphoria Anxiety/Fearfulness Irritability   -None -Generalized Anxiety longstanding with some improvement since stopping work and switching to lexapro -None     Behavior: More gambling and gambling debt for the past few years; she is aware its a problem and reports she is giving this up for Lent as a way to assess self-control.    Neurovegetative:  Sleep/Nighttime  Appetite Energy   -Worse sleep and goes to bed later and wakes up even earlier    -daily naps   -Normal -"always" good for Morning energy   -Afternoon is energy low but naps help     Suicidal/Homicidal Ideation: None    Physical Symptoms: Pain level is 'terrible' in her hip (right that was replaced) and 'they can't do anything aside from telling me its arthritis'     PERTINENT BACKGROUND INFORMATION   SOCIAL HISTORY    Family Status:  (ex- on drugs) and has a boyfriend " (since 2004) + 2 adult sons ( and each 2 kids)   Current Living Situation:  Own home  Primary Source of Support:   Daily Activities: Socializing, household tasks, babysit grandchildren  Stressors: None  Other Factors:  Educational Level: HS   Occupational Status and History:  for [employer] for 20-years  Filed for disability for 2021 and for PD    Family History   Problem Relation Age of Onset    Coronary artery disease Father     Diabetes Father     Hypertension Mother     Neuropathy Mother     Diabetes Mother     Parkinsonism Neg Hx     Breast cancer Neg Hx     Ovarian cancer Neg Hx      Family Neurologic History: Alzheimer's (Father at 90yo onset)  Family Psychiatric History: Negative for heritable risk factors    MEDICAL STATUS  Patient Active Problem List   Diagnosis    PD (Parkinson's disease)    Atypical lobular hyperplasia of right breast    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    Generalized anxiety disorder     Past Medical History:   Diagnosis Date    Anxiety     Hypertension     Mitral valve prolapse syndrome     Other chronic sinusitis     Parkinson disease 2004    Right tremor type    PONV (postoperative nausea and vomiting)      Past Surgical History:   Procedure Laterality Date    BREAST SURGERY      TONSILLECTOMY, ADENOIDECTOMY       Updated/Relevant Neurologic History:  Falls: September 2019 and now uses a walker  TBI: None  Seizures: None  Stroke: None  Movement Concerns: Onset 7-years ago; young onset subtype; followed by Shirlene Padgett/Fatuma  DBS Workup: Currently, considering DBS. Primary aim for DBS is to help her freezing which is quite distressing to her and disabling. She understands potential for success given prior clinical conversations. She was unaware of risk for increased neuropsychiatric sxs, like impulse control disorders.      Recent Labs   Lab Results   Component Value Date    OCLXROLJ86 219 11/02/2011     No  results found for: RPR  No results found for: FOLATE  Lab Results   Component Value Date    TSH 1.682 11/02/2011    V6TAFMM 6.7 11/02/2011     No results found for: LABA1C, HGBA1C  No results found for: HIV1X2, DMH42AAPA    Recent Imaging  2019 Head CT  There is no evidence for acute intracranial hemorrhage or sulcal effacement.  The ventricles are normal in size without hydrocephalus.  There is no midline shift or mass effect.  Visualized paranasal sinuses and mastoid air cells are clear.      Impression       Unremarkable noncontrast CT head specifically without evidence for acute intracranial hemorrhage.  Clinical correlation and further evaluation as warranted.      Electronically signed by: Kvng Allred DO  Date: 06/27/2019 2011 Brain MRI  Findings:  MRI demonstrates that the brain is normally formed.     Ventricles are normal in size.  Overall volume of brain is preserved.     Perivascular CSF spaces are present at the base of the brain and in near   the vertex which are considered normal.  No diffusion abnormality is   present.  T2 star images show no evidence of old hemorrhage.  Following   contrast administration there is no abnormal enhancement.  Skull base   structures and calvarium appear intact           Impression:       Normal MRI of the brain     Current Medications    Current Outpatient Medications:     ALPRAZolam (XANAX XR) 0.5 MG Tb24, Take by mouth once daily., Disp: , Rfl:     ALPRAZolam (XANAX) 0.5 MG tablet, Take 0.25-0.5 mg by mouth daily as needed., Disp: , Rfl:     amantadine HCL (SYMMETREL) 100 mg capsule, TAKE 1 CAPSULE BY MOUTH TWICE A DAY, Disp: 180 capsule, Rfl: 3    azelastine (ASTELIN) 137 mcg (0.1 %) nasal spray, SPRAY 1 SPRAY BY NASAL ROUTE 2 TIMES DAILY., Disp: 30 mL, Rfl: 1    EScitalopram oxalate (LEXAPRO) 10 MG tablet, Take 10 mg by mouth once daily., Disp: , Rfl:     nebivolol (BYSTOLIC) 2.5 MG Tab, Take 1 tablet (2.5 mg total) by mouth once daily., Disp: 5 tablet, Rfl:  "0    rasagiline (AZILECT) 1 mg Tab, TAKE 1 TABLET BY MOUTH EVERY DAY, Disp: 90 tablet, Rfl: 3    RYTARY 36. mg CpSR, TAKE BY MOUTH 1 CAPSULE 3 TIMES A DAY, Disp: 270 capsule, Rfl: 3    Updated/Relevant Psychiatric History:   +Counseling: Went to for years to a psychiatrist "not helpful" and had another counselor for a few sessions that was helpful with a workbook  +Meds: Longstanding Paxil and now rarely/if ever uses Xanax    MENTAL STATUS AND OBSERVATIONS:  APPEARANCE: Casually dressed and adequate grooming/hygiene.   ALERTNESS/ORIENTATION: Attentive and alert. Fully oriented (x5) to time and place  GAIT: Not assessed  MOTOR MOVEMENTS/MANNERISMS: Not assessed  SPEECH/LANGUAGE: Normal in rate, rhythm, tone, and volume. No significant word finding difficulty noted. Expressive and receptive language was normal.  STATED MOOD/AFFECT: The patients stated mood was "good, better since I've stopped working" Affect was congruent with stated mood.   INTERPERSONAL BEHAVIOR: Rapport was quickly and easily established   SUICIDALITY/HOMICIDALITY: Denied  HALLUCINATIONS/DELUSIONS: None evidenced or endorsed    BILLING     Service Description CPT Code Minutes Units   Psychiatric diagnostic evaluation by physician [id number]   Neurobehavioral status exam by physician [id number]     Each additional hour by physician [id number]     Test Evaluation Services --     Neuropsychological testing evaluation services by physician [id number]     Each additional hour by physician [id number]     Test Administration and Scoring --     Psychological or neuropsychological test administration and scoring by physician [id number]     Each additional 30 minutes by physician [id number]     Psychological or neuropsychological test administration and scoring by technician [id number]     Each additional 30 minutes by technician [id number]         "

## 2023-02-23 NOTE — LETTER
This communication is flagged as high priority.      March 2, 2023        Ariella Rao MD  [address] [facility] [address]             [facility] [address]  [address]  [place] [address]  Phone: [phone]  Fax: [phone]   Patient: Tika Navarro   MR Number: [id number]   YOB: 1965   Date of Visit: 2/23/2023       Dear Dr. Rao:    Thank you for referring Tika Navarro to me for evaluation. Below are the relevant portions of my assessment and plan of care.     >> of note, she is having likely problem gambling and this may potentially be a gambling addiction.  Will reassess when she comes in for testing.       If you have questions, please do not hesitate to call me. I look forward to following Tika DECKER along with you.    Sincerely,      CALDERON Monroy II, PhD           CC  No Recipients

## 2023-02-27 ENCOUNTER — TELEPHONE (OUTPATIENT)
Dept: NEUROLOGY | Facility: CLINIC | Age: 58
End: 2023-02-27
Payer: MEDICARE

## 2023-02-27 NOTE — TELEPHONE ENCOUNTER
----- Message from George Pizano sent at 2/27/2023 10:37 AM CST -----  Regarding: appt today Pt advice  Contact: Pt @ [phone]  Pt is requesting a call back to speak with someone because she took her parkinson meds. Please call to advise, thank you.

## 2023-02-27 NOTE — TELEPHONE ENCOUNTER
Called pt regarding vv today with Dr. Rao, to see if she had any questions regarding logging in, and to see if she abstained for the levadopa medication before her visit. No answer, left vm.

## 2023-03-02 NOTE — ASSESSMENT & PLAN NOTE
Assessment:  -reports stable cognition since 2020 neuropsych evaluation  -continues to have some trouble with frontal-subcortical cognitive skills and managing with coping strategies  -does have some likely problem gambling going on but she is trying to stop over Lent  Plan:  -cognitive status, provide differential diagnosis, update treatment plan particularly with recommendations for potential DBS surgery  -in particular will have little more in-depth assessment regarding her problem gambling

## 2023-03-06 ENCOUNTER — TELEPHONE (OUTPATIENT)
Dept: NEUROLOGY | Facility: CLINIC | Age: 58
End: 2023-03-06
Payer: MEDICARE

## 2023-03-06 NOTE — TELEPHONE ENCOUNTER
Called pt to reschedule DBS on/off evaluation to an earlier time and date than had previously schedule. I scheduled her for Monday 03/27/23 at 10:20 AM for a 60 minute appt.

## 2023-03-15 ENCOUNTER — OFFICE VISIT (OUTPATIENT)
Dept: PRIMARY CARE CLINIC | Facility: CLINIC | Age: 58
End: 2023-03-15
Payer: MEDICARE

## 2023-03-15 VITALS
WEIGHT: 131.81 LBS | SYSTOLIC BLOOD PRESSURE: 138 MMHG | OXYGEN SATURATION: 98 % | RESPIRATION RATE: 18 BRPM | BODY MASS INDEX: 21.18 KG/M2 | TEMPERATURE: 98 F | DIASTOLIC BLOOD PRESSURE: 82 MMHG | HEART RATE: 73 BPM | HEIGHT: 66 IN

## 2023-03-15 DIAGNOSIS — F41.1 GENERALIZED ANXIETY DISORDER: Chronic | ICD-10-CM

## 2023-03-15 DIAGNOSIS — G20.A1 PD (PARKINSON'S DISEASE): Chronic | ICD-10-CM

## 2023-03-15 DIAGNOSIS — Z96.641 HISTORY OF RIGHT HIP REPLACEMENT: ICD-10-CM

## 2023-03-15 DIAGNOSIS — I10 BENIGN ESSENTIAL HYPERTENSION: Primary | ICD-10-CM

## 2023-03-15 DIAGNOSIS — E78.5 BORDERLINE HYPERLIPIDEMIA: ICD-10-CM

## 2023-03-15 PROCEDURE — 99999 PR PBB SHADOW E&M-EST. PATIENT-LVL IV: CPT | Mod: PBBFAC,,, | Performed by: INTERNAL MEDICINE

## 2023-03-15 PROCEDURE — 99204 PR OFFICE/OUTPT VISIT, NEW, LEVL IV, 45-59 MIN: ICD-10-PCS | Mod: S$GLB,,, | Performed by: INTERNAL MEDICINE

## 2023-03-15 PROCEDURE — 99204 OFFICE O/P NEW MOD 45 MIN: CPT | Mod: S$GLB,,, | Performed by: INTERNAL MEDICINE

## 2023-03-15 PROCEDURE — 99999 PR PBB SHADOW E&M-EST. PATIENT-LVL IV: ICD-10-PCS | Mod: PBBFAC,,, | Performed by: INTERNAL MEDICINE

## 2023-03-15 RX ORDER — NEBIVOLOL 2.5 MG/1
2.5 TABLET ORAL DAILY
Qty: 90 TABLET | Refills: 3 | Status: SHIPPED | OUTPATIENT
Start: 2023-03-15 | End: 2023-03-15 | Stop reason: SDUPTHER

## 2023-03-15 RX ORDER — ESCITALOPRAM OXALATE 10 MG/1
10 TABLET ORAL DAILY
Qty: 90 TABLET | Refills: 3 | Status: SHIPPED | OUTPATIENT
Start: 2023-03-15 | End: 2023-03-15 | Stop reason: SDUPTHER

## 2023-03-15 RX ORDER — NEBIVOLOL 2.5 MG/1
2.5 TABLET ORAL DAILY
Qty: 90 TABLET | Refills: 3 | Status: SHIPPED | OUTPATIENT
Start: 2023-03-15

## 2023-03-15 RX ORDER — ESCITALOPRAM OXALATE 10 MG/1
10 TABLET ORAL DAILY
Qty: 90 TABLET | Refills: 3 | Status: SHIPPED | OUTPATIENT
Start: 2023-03-15 | End: 2024-03-12

## 2023-03-15 NOTE — PROGRESS NOTES
[facility] Note    Chief Complaint      Chief Complaint   Patient presents with    Establish Care       History of Present Illness      Tika Navarro is a 57 y.o. female who presents today for   Chief Complaint   Patient presents with    Establish Care   .  Patient comes to appointment here for establish visit with me . She is due for full albs wt this visit . Her mnian issu is with her pd . She si workign with her nurology team cassie . She is working on finding new gyn is due for pelvic /pap. Has had colonoscopy at 48 with dr pruett .     Problem List Items Addressed This Visit          Neuro    PD (Parkinson's disease) (Chronic)    Overview     neruop managing dr abigail interiano current             Psychiatric    Generalized anxiety disorder (Chronic)       Cardiac/Vascular    Borderline hyperlipidemia    Overview     Need s repeeat cmp and lipid panel .          Benign essential hypertension - Primary    Overview     bp well controlled in current regimen             Orthopedic    History of right hip replacement    Overview     Dr powers has done replacement still with some hip pain               Past Medical History:  Past Medical History:   Diagnosis Date    Anxiety     Hypertension     Mitral valve prolapse syndrome     Other chronic sinusitis     Parkinson disease 2004    Right tremor type    PONV (postoperative nausea and vomiting)        Past Surgical History:  Past Surgical History:   Procedure Laterality Date    BREAST SURGERY      FRACTURE SURGERY  1/1/2018    Broken hip    JOINT REPLACEMENT  1/1/2018    Hip    TONSILLECTOMY, ADENOIDECTOMY         Family History:  family history includes Coronary artery disease in her father; Diabetes in her father and mother; Hypertension in her mother; Neuropathy in her mother.    Social History:  Social History     Socioeconomic History    Marital status:    Tobacco Use    Smoking status: Never    Smokeless tobacco: Never    Substance and Sexual Activity    Alcohol use: Yes     Alcohol/week: 1.0 standard drink     Types: 1 Glasses of wine per week    Drug use: Never    Sexual activity: Yes     Partners: Male     Birth control/protection: None   Social History Narrative    Lives with boyfriend       Review of Systems:   Review of Systems   HENT:  Negative for hearing loss.    Eyes:  Negative for discharge.   Respiratory:  Negative for wheezing.    Cardiovascular:  Negative for chest pain and palpitations.   Gastrointestinal:  Negative for blood in stool, constipation, diarrhea and vomiting.   Genitourinary:  Negative for dysuria and hematuria.   Musculoskeletal:  Negative for neck pain.   Neurological:  Negative for weakness and headaches.   Endo/Heme/Allergies:  Negative for polydipsia.       Medications:  Outpatient Encounter Medications as of 3/15/2023   Medication Sig Note Dispense Refill    ALPRAZolam (XANAX XR) 0.5 MG Tb24 Take by mouth once daily. 12/23/2015: May take as prescribed      amantadine HCL (SYMMETREL) 100 mg capsule TAKE 1 CAPSULE BY MOUTH TWICE A DAY  180 capsule 3    rasagiline (AZILECT) 1 mg Tab TAKE 1 TABLET BY MOUTH EVERY DAY  90 tablet 3    RYTARY 36. mg CpSR TAKE BY MOUTH 1 CAPSULE 3 TIMES A DAY  270 capsule 3    [DISCONTINUED] EScitalopram oxalate (LEXAPRO) 10 MG tablet Take 10 mg by mouth once daily.       [DISCONTINUED] nebivolol (BYSTOLIC) 2.5 MG Tab Take 1 tablet (2.5 mg total) by mouth once daily.  5 tablet 0    EScitalopram oxalate (LEXAPRO) 10 MG tablet Take 1 tablet (10 mg total) by mouth once daily.  90 tablet 3    nebivoloL (BYSTOLIC) 2.5 MG Tab Take 1 tablet (2.5 mg total) by mouth once daily.  90 tablet 3    [DISCONTINUED] ALPRAZolam (XANAX) 0.5 MG tablet Take 0.25-0.5 mg by mouth daily as needed.       [DISCONTINUED] azelastine (ASTELIN) 137 mcg (0.1 %) nasal spray SPRAY 1 SPRAY BY NASAL ROUTE 2 TIMES DAILY.  30 mL 1    [DISCONTINUED] EScitalopram oxalate (LEXAPRO) 10 MG tablet Take 1  tablet (10 mg total) by mouth once daily.  90 tablet 3    [DISCONTINUED] nebivoloL (BYSTOLIC) 2.5 MG Tab Take 1 tablet (2.5 mg total) by mouth once daily.  90 tablet 3     No facility-administered encounter medications on file as of 3/15/2023.        Allergies:  Review of patient's allergies indicates:   Allergen Reactions    No known drug allergies          Physical Exam         Vitals:    03/15/23 1312   BP: 138/82   Pulse: 73   Resp: 18   Temp: 98 °F (36.7 °C)         Physical Exam  Constitutional:       Appearance: She is well-developed.   Eyes:      Pupils: Pupils are equal, round, and reactive to light.   Neck:      Thyroid: No thyromegaly.   Cardiovascular:      Rate and Rhythm: Normal rate.      Heart sounds: Normal heart sounds. No murmur heard.    No friction rub. No gallop.   Pulmonary:      Breath sounds: Normal breath sounds.   Abdominal:      General: Bowel sounds are normal.      Palpations: Abdomen is soft.   Musculoskeletal:         General: Normal range of motion.      Cervical back: Normal range of motion.   Lymphadenopathy:      Cervical: No cervical adenopathy.   Skin:     General: Skin is warm.      Findings: No rash.   Neurological:      Mental Status: She is alert and oriented to person, place, and time.      Cranial Nerves: No cranial nerve deficit.   Psychiatric:         Behavior: Behavior normal.        Laboratory:  CBC:  No results for input(s): WBC, RBC, HGB, HCT, PLT, MCV, MCH, MCHC in the last 2160 hours.  CMP:  No results for input(s): GLU, CALCIUM, ALBUMIN, PROT, NA, K, CO2, CL, BUN, ALKPHOS, ALT, AST, BILITOT in the last 2160 hours.    Invalid input(s): CREATININ  URINALYSIS:  No results for input(s): COLORU, CLARITYU, SPECGRAV, PHUR, PROTEINUA, GLUCOSEU, BILIRUBINCON, BLOODU, WBCU, RBCU, BACTERIA, MUCUS, NITRITE, LEUKOCYTESUR, UROBILINOGEN, HYALINECASTS in the last 2160 hours.   LIPIDS:  No results for input(s): TSH, HDL, CHOL, TRIG, LDLCALC, CHOLHDL, NONHDLCHOL, TOTALCHOLEST in  the last 2160 hours.  TSH:  No results for input(s): TSH in the last 2160 hours.  A1C:  No results for input(s): HGBA1C in the last 2160 hours.    Radiology:        Assessment:     Tika Navarro is a 57 y.o.female with:    Benign essential hypertension  -     CBC Auto Differential; Future; Expected date: 03/15/2023  -     Discontinue: nebivoloL (BYSTOLIC) 2.5 MG Tab; Take 1 tablet (2.5 mg total) by mouth once daily.  Dispense: 90 tablet; Refill: 3  -     nebivoloL (BYSTOLIC) 2.5 MG Tab; Take 1 tablet (2.5 mg total) by mouth once daily.  Dispense: 90 tablet; Refill: 3    Borderline hyperlipidemia  -     Comprehensive Metabolic Panel; Future; Expected date: 03/15/2023  -     Lipid Panel; Future; Expected date: 03/15/2023    PD (Parkinson's disease)    Generalized anxiety disorder  -     Discontinue: EScitalopram oxalate (LEXAPRO) 10 MG tablet; Take 1 tablet (10 mg total) by mouth once daily.  Dispense: 90 tablet; Refill: 3  -     EScitalopram oxalate (LEXAPRO) 10 MG tablet; Take 1 tablet (10 mg total) by mouth once daily.  Dispense: 90 tablet; Refill: 3    History of right hip replacement          Plan:     Problem List Items Addressed This Visit          Neuro    PD (Parkinson's disease) (Chronic)    Overview     neruop managing dr abigail interiano current             Psychiatric    Generalized anxiety disorder (Chronic)       Cardiac/Vascular    Borderline hyperlipidemia    Overview     Need s repeeat cmp and lipid panel .          Benign essential hypertension - Primary    Overview     bp well controlled in current regimen             Orthopedic    History of right hip replacement    Overview     Dr powers has done replacement still with some hip pain             As above, continue current medications and maintain follow up with specialists.  Return to clinic in 6 months.      Frederick W Dantagnan [facility]                Answers submitted by the patient for this  visit:  Review of Systems Questionnaire (Submitted on 3/7/2023)  activity change: No  unexpected weight change: No  rhinorrhea: No  trouble swallowing: No  visual disturbance: No  chest tightness: No  polyuria: No  difficulty urinating: No  menstrual problem: No  joint swelling: Yes  arthralgias: Yes  confusion: No  dysphoric mood: No

## 2023-03-27 ENCOUNTER — TELEPHONE (OUTPATIENT)
Dept: NEUROLOGY | Facility: CLINIC | Age: 58
End: 2023-03-27
Payer: MEDICARE

## 2023-03-27 ENCOUNTER — OFFICE VISIT (OUTPATIENT)
Dept: NEUROLOGY | Facility: CLINIC | Age: 58
End: 2023-03-27
Payer: MEDICARE

## 2023-03-27 DIAGNOSIS — G20.A1 PD (PARKINSON'S DISEASE): Chronic | ICD-10-CM

## 2023-03-27 PROCEDURE — 99215 PR OFFICE/OUTPT VISIT, EST, LEVL V, 40-54 MIN: ICD-10-PCS | Mod: 95,,, | Performed by: PSYCHIATRY & NEUROLOGY

## 2023-03-27 PROCEDURE — 99215 OFFICE O/P EST HI 40 MIN: CPT | Mod: 95,,, | Performed by: PSYCHIATRY & NEUROLOGY

## 2023-03-27 NOTE — ASSESSMENT & PLAN NOTE
iPD coming fro DBS eval.  Completed ON/OFF eval    PD profile:  R handed  Upper>lower body  R>L tremor and rigidity are equal  Dystonia and dyskinesia are an issue for her  She would like R gpi first      Completed neuropsych- concern for lifelong gambling habit  She reports she goes always with her boyfriend and has no had instances of overspending.    We dicussed STN and GPI targets. Suggsted Gpi given disinhibition isue in past.  She understands GPI targeting will not allow her to substantially lower Ldopa dose. She may be able to decrease amantadine as Gpi may help with dyskinesias.    Suggest she meet neurosurgery.

## 2023-03-27 NOTE — PROGRESS NOTES
The patient location is: HOME  The chief complaint leading to visit is: iPD  1. PD (Parkinson's disease)          Visit type: Virtual visit with synchronous audio and video  Total time spent with patient: 40  Each patient to whom he or she provides medical services by telemedicine is:  (1) informed of the relationship between the physician and patient and the respective role of any other health care provider with respect to management of the patient; and (2) notified that he or she may decline to receive medical services by telemedicine and may withdraw from such care at any time.    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 woman with HTN, anxiety, R hip replacement, MVP, with iPD starting 2004 coming for DBS eval per Dr. Padgett. She notes in 2004 R hand 5th digit started to have a postural tremor. This progressed to the arm that years. Her gait was uninvolved until much more recently. Fell and broke elbow 2017. R hip pain began and grew until she was wheelchair due to pain until a repair 2018 (revised several times). After surgery she started feeling imbalanced. Does not fall but trips at times. Does not use an assist device. Can walk 2 blocks before out of breath.  (Pulmonology workup ongoing). Dyskinesias started 4 years ago. At times she can rock herself out of a chair (sliding down and out). Mild R foot dystonia started 4 years ago.  Some FOG when she turns x 2 years (when OFF).    Sweats profusely when medications wear  "off    2 brothers and sister. No neuro issues.  Parents without neuro issues.  Has 2 boys- 40 and 38.    Med hx  Did not take medications till 2010  Started carbidopa/levodopa 25/100mg and amantadine    Current medications  -Rytary 145 1 tab TID [id number].  Lasting 4-5 hours before noticing effect wears off.    When it wears off her walking slows r>L.  -Dyskinesias continue, trying amantidine 100 mg Qdaily with about 70% relief.    Auditory hallucinations at times  Mild orthostasis    DBS expectations    She'd like to decrease medications to decrease dyskinesias  She'd like to be able to better balance  She wants her speech top be clearer  She'd like more energy  She'd like her hip pain  to resolve - but unclear if is from prior hip surgery    Had neuropsych testing 1 year prior    "Prior  Interval History:  - amantadine caused livedo, she's generally ok with   - more dyskinesia and dystonia of R arm and leg  - some falls, usually happens with a trip but more dramatic than if she did not have PD  - no big injuries, feels her vital proteins are helping  - gambling is still an issue, playing video [employer] machines - at the one place she goes, but has had wins and losses of $1000+ at a time  - eating healthy, sex drive is higher than her partner, says it is ok with their relationship  - NOW is finally interested in DBS, not intersted in any med changes at this time    From 9/30/21:  - 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, reduced down to only 1 tab a day and the reti remains  - rytary 95 TID - if higher, gets back dyskinesia  - getting PT TID, at [facility] on [address] [employer]  - lost about 6 lbs, weighs about 130  - really feeling only slightly off at times  - willing to try 145s  - enjoying [facility]    2021:  - feeling more progression, more into the left  - more left hand tremor  - feels rytary wearing off, in the dip at 3-4 hours  - tingling in fingers and toes are periodically  - R hip is tighter than ever, post " "surgery 3 years ago - more pain now  - some worry about memory in the house, more micorwave issues "  -     From 2/19/20  -Rytary 145 1 tab TID [id number].  Lasting 4-5 hours before noticing effect wears off.    -Dyskinesias continue, trying amantidine 100 mg Qdaily with about 70% relief.  However, significantly worsens with stress.  -Continues on azilect, tolerating well  -Last fall, September 2019.  Using walker without further falls.    -Lots of stress at work with new boss.    -Memory loss - forgetting names of kids, best friends.  -Describes stabbing pain in R buttocks, no radiation.  Worse in AM.  Trying stretching, massage.  Feels similar pain to prior when she had R hip arthroplasty.      From 11/2019  - still having the Rytary dip  - more dystonia of the R side affecting gait  - not happy with her doing worse  - out of rytary 95s  - more freezing when off  - some ICD with shopping and eating and gambling    June 2019:  - last seen last year  - more off time, better stronger percieved at 3 hours ("Rytary dip")  - takes 1/2 tab cd/ld at 1 hr, then she gets dyskinetic  - no new tremor  - more dy skinesia  - cog ok  - mood stable  - no new bowel or bladder issues    From May 2018:   - 3 hip surgeries, last one was fully replaced R side on May 23rd.  Had some wacky dreams in the post-op ion January, better now.  Previous plate screws in R hip not working, then they added a amelie, and failed as well with poor healing.  Then replaced the whole thing.  Feels well overall now.     - now with a bit of pain but ok  - less dyskinesia now  -adds a little IR in the am (1/4 to 1/2) to boost her on      From Aug 2017:  - taking rytary 145 1 tab tid plus adds in cd/ld 25/100 1/4 tabs as needed  - doing well overall  - feels gait si a bit crooked  - still fairly brittle with dyskinesia  - declines dbs or duopa  - might be good candidate for neuroderm patch study  - dx with jose schuster of hand      From 6/2016 with " Alisia:  Dyskinesias are not bad until she gets anxious. Mainly notes in the RUE/ RLE.  Tends to note dyskinesias in the evening around 7 to 7:30 PM, which annoys her as she is home and eating supper and wants to relax. She mentions that if could choose to have tremor or dyskinesias, she'd choose tremor.     She takes Rytary at 7:30- 12:30- 5:30. She uses 1/4 tab of cd/ld PRN tremor, stiffness, or slowness. On a bad day, she will use 1/4 tab up to TID. She has days where she does not require any extra doses.      Impulsive tendencies:  Eating, sex, gambling, and spending.    History of Present Illness (HPI):  46 yo patient with history of YOPD, previously seen by my colleague Dr. Burris, last visit one year ago.    Patient describes initial symptoms of R hand tremor, progressing with mariela and CW over the past 5-7 years.  Had ICD with MPX - now not taking.  Poorly compliant with CD/LD 25/100 and has tendency to use as needed.    Wants more stability and regular follow-up.    Nonmotor/Premotor symptoms:  Hyposmia? Yes  RBD/sleep issues? Yes  Depression/anxiety? No  Constipation? No  Urinary issues? No  Sexual dysfunction? No  Orthostasis? No  Falls? No  Cognitive impairment? No  Psychoses? No  Pain? No    Per Dr. Burris's notes: 47 yo female with PD.  She takes Mirapex ER 0.75mg daily which has been titrated down due to her obsessive gambling problem. She was started on Sinemet 25/100 that she is taking 1 tablet twice daily. She is tolerating it well without major side effects, but does not think it is helpful at all for right hand tremor. She was previously on Artane with major improvement in symptoms but had to stop that due to forgetfulness and stuttering problems.  Currently she states that her symptoms are worse since March when she stopped Artane. She is having trouble with writing and balance.  Her sleep is fine and has not had any falls. Her gambling problem is improved according to her, but still compulsive  1/week.     ROS:  Review of Systems   Constitutional:  Negative for fever, malaise/fatigue and weight loss.   HENT:  Negative for congestion and hearing loss.    Eyes:  Negative for blurred vision and double vision.   Respiratory:  Negative for cough, shortness of breath and stridor.    Cardiovascular:  Negative for chest pain, palpitations and leg swelling.   Gastrointestinal:  Negative for constipation, nausea and vomiting.   Genitourinary:  Negative for dysuria, frequency and urgency.   Musculoskeletal:  Positive for falls. Negative for myalgias.   Skin:  Negative for rash.   Neurological:  Positive for tremors. Negative for speech change, focal weakness, seizures and headaches.   Endo/Heme/Allergies:  Does not bruise/bleed easily.   Psychiatric/Behavioral:  Negative for depression, hallucinations and memory loss. The patient is not nervous/anxious.    Past Medical History: The patient  has a past medical history of Anxiety, Hypertension, Mitral valve prolapse syndrome, Other chronic sinusitis, Parkinson disease (2004), and PONV (postoperative nausea and vomiting).    Social History: The patient  reports that she has never smoked. She has never used smokeless tobacco. She reports current alcohol use of about 1.0 standard drink per week. She reports that she does not use drugs.    Family History: Their family history includes Coronary artery disease in her father; Diabetes in her father and mother; Hypertension in her mother; Neuropathy in her mother.    Allergies: No known drug allergies     Meds:   Current Outpatient Medications on File Prior to Visit   Medication Sig Dispense Refill    ALPRAZolam (XANAX XR) 0.5 MG Tb24 Take by mouth once daily.      amantadine HCL (SYMMETREL) 100 mg capsule TAKE 1 CAPSULE BY MOUTH TWICE A  capsule 3    EScitalopram oxalate (LEXAPRO) 10 MG tablet Take 1 tablet (10 mg total) by mouth once daily. 90 tablet 3    nebivoloL (BYSTOLIC) 2.5 MG Tab Take 1 tablet (2.5 mg total) by  "mouth once daily. 90 tablet 3    rasagiline (AZILECT) 1 mg Tab TAKE 1 TABLET BY MOUTH EVERY DAY 90 tablet 3    RYTARY 36. mg CpSR TAKE BY MOUTH 1 CAPSULE 3 TIMES A  capsule 3     No current facility-administered medications on file prior to visit.     Exam:  [facility] 12/28/2011     ON EXAM  * Specialized movement exam  Normal speech.   No SWJs  No ataxia   Mild facial masking.   R SEVERE bradykinesia while OFF on R, Mod on L.      ? Finger taps Finger flicks ARTHUR Heel taps   Left 1+ 1+ 1+ 1+   Right 2+ 1+ 1+ 3+   Neck tone: nl  ? Arm Leg   Left 1+ 1+   Right 2+ 2+       Intermittent modest dyskinesias of RUE>LUE, BLE, neck    No other chorea, athetosis, myoclonus, or tics.   No motor impersistence.   Gait is normal-based and steady with good stride length        ON/OFF VIDEO Rating Scale    Time since levodopa: 12hrs  Time to ON : 30mines    MOTOR EXAMINATION (OFF)       Speech  0 - Normal.   Facial Expression  1 - Minimal Hypomimia, could be normal "Poker Face".   Tremor at Rest:      Face, lips, chin 0 - Absent.    Hands:      right 3 - Moderate in amplitude and present most of the time.   left 2 - Mild in amplitude and persistent. Or moderate in amplitude, but only intermittently present.    Feet:      right 0 - Absent.    left 0 - Absent.    Action or Postural Tremor of Hands      right 1 - Slight; present with action.   left 1 - Slight; present with action.                           Finger Taps      right 3 - Severely impaired. Frequent hesitation in initiating movements or arrests in ongoing movement.   left 2 - Moderately impaired. Definite and early fatiguing. May have occasional arrests in movement.   Hand Movements      right 2 - Moderately impaired. Definite and early fatiguing. May have occasional arrests in movement.   left 1 - Mild slowing and/or reduction in amplitude.   Rapid Alternating Movements of Hands      right 3 - Severely impaired. Frequent hesitation in initiating movements or " arrests in ongoing movement.   left 2 - Moderately impaired. Definite and early fatiguing. May have occasional arrests in movement.   Leg Agility      right 3 - Severely impaired. Frequent hesitation in initiating movements or arrests in ongoing movement.   left 2 - Moderately impaired. Definite and early fatiguing. May have occasional arrests in movement.   Arising from Chair  1 - Slow; or may need more than one attempt.   Posture  1 - Not quite erect, slightly stooped posture; could be normal for older person.   Gait  1 - Walks slowly, may shuffle with short steps, but no festination (hastening steps) or propulsion.       Body Bradykinesia and Hypokinesia (Combining slowness, hesitancy, decreased armswing, small amplitude, and poverty of movement in general)  1 - Minimal slowness, giving movement a deliberate character; could be normal for some persons. Possibly reduced amplitude.     30  _______________________________________________________________      MOTOR EXAMINATION (ON)       Speech  0 - Normal.   Facial Expression  0 - Normal.   Tremor at Rest:      Face, lips, chin 0 - Absent.    Hands:      right 1   left 0 - Absent.    Feet:      right 0 - Absent.    left 0 - Absent.    Action or Postural Tremor of Hands      right 1 - Slight; present with action.   left 0 - Absent.                            Finger Taps      right 2 - Moderately impaired. Definite and early fatiguing. May have occasional arrests in movement.   left 1 - Mild slowing and/or reduction in amplitude.   Hand Movements      right 1 - Mild slowing and/or reduction in amplitude.   left 1 - Mild slowing and/or reduction in amplitude.   Rapid Alternating Movements of Hands      right 1 - Mild slowing and/or reduction in amplitude.   left 1 - Mild slowing and/or reduction in amplitude.   Leg Agility      right 2 - Moderately impaired. Definite and early fatiguing. May have occasional arrests in movement.   left 1 - Mild slowing and/or reduction  in amplitude.   Arising from Chair  0 - Normal.   Posture  0 - Normal erect.   Gait  0 - Normal.       Body Bradykinesia and Hypokinesia (Combining slowness, hesitancy, decreased armswing, small amplitude, and poverty of movement in general)  0 - None.     12      Laboratory/Radiological:  - Results:  - Independent review of images: MRI normal 2011    Problem List Items Addressed This Visit          Neuro    PD (Parkinson's disease) (Chronic)    Overview     neruop managing dr abigail interiano current          Current Assessment & Plan     iPD coming fro DBS eval.  Completed ON/OFF eval    PD profile:  R handed  Upper>lower body  R>L tremor and rigidity are equal  Dystonia and dyskinesia are an issue for her  She would like R gpi first      Completed neuropsych- concern for lifelong gambling habit  She reports she goes always with her boyfriend and has no had instances of overspending.    We dicussed STN and GPI targets. Suggsted Gpi given disinhibition isue in past.  She understands GPI targeting will not allow her to substantially lower Ldopa dose. She may be able to decrease amantadine as Gpi may help with dyskinesias.    Suggest she meet neurosurgery.                  Ariella Rao MD, MS [facility]  Department of Neurology  Movement Disorders

## 2023-03-29 LAB
ALBUMIN: 5 G/DL (ref 3.5–5)
ALP SERPL-CCNC: 89 U/L (ref 38–126)
ALT SERPL W P-5'-P-CCNC: <5 U/L (ref 7–56)
ANION GAP SERPL CALC-SCNC: 15.2 MMOL/L (ref 9–18)
AST SERPL-CCNC: 17 U/L (ref 7–40)
BASOPHILS ABSOLUTE COUNT: 0.1 THOUSAND/UL (ref 0–0.2)
BASOPHILS NFR BLD: 2 % (ref 0–2)
BILIRUB SERPL-MCNC: 0.9 MG/DL (ref 0–1.2)
BUN BLD-MCNC: 11 MG/DL (ref 7–21)
BUN/CREAT SERPL: 15 (ref 6–22)
CALC OSMOLALITY: 280 MOSM/KG (ref 275–295)
CALCIUM SERPL-MCNC: 9.8 MG/DL (ref 8.5–10.4)
CHLORIDE SERPL-SCNC: 99 MMOL/L (ref 98–107)
CHOL/HDLC RATIO: 2.02
CHOLEST SERPL-MSCNC: 244 MG/DL (ref 100–200)
CO2 SERPL-SCNC: 31 MMOL/L (ref 21–31)
CREAT SERPL-MCNC: 0.73 MG/DL (ref 0.5–1)
EGFR: 95 ML/MIN
EOSINOPHIL NFR BLD: 5.3 % (ref 0–4)
EOSINOPHILS ABSOLUTE COUNT: 0.2 THOUSAND/UL (ref 0–0.7)
ERYTHROCYTE [DISTWIDTH] IN BLOOD BY AUTOMATED COUNT: 13.2 % (ref 12–15.3)
GLUCOSE SERPL-MCNC: 93 MG/DL (ref 70–100)
HCT VFR BLD AUTO: 42 % (ref 37–47)
HDLC SERPL-MCNC: 121 MG/DL (ref 40–75)
HGB BLD-MCNC: 14.1 GM/DL (ref 12–16)
LDLC SERPL CALC-MCNC: 109 MG/DL (ref 0–125)
LYMPHOCYTES %: 39.9 % (ref 15–45)
LYMPHOCYTES ABSOLUTE COUNT: 1.6 THOUSAND/UL (ref 1–4.2)
MCH RBC QN AUTO: 32.4 PG (ref 27–33)
MCHC RBC AUTO-ENTMCNC: 33.5 G/DL (ref 32–36)
MCV RBC AUTO: 96.5 FL (ref 81–99)
MONOCYTES %: 12.1 % (ref 3–13)
MONOCYTES ABSOLUTE COUNT: 0.5 THOUSAND/UL (ref 0.1–0.8)
NEUTROPHILS ABSOLUTE COUNT: 1.7 THOUSAND/UL (ref 2.1–7.6)
NEUTROPHILS RELATIVE PERCENT: 40.7 % (ref 32–80)
PLATELET # BLD AUTO: 210 THOUSAND/UL (ref 150–350)
PMV BLD AUTO: 9.4 FL (ref 7–10.2)
POTASSIUM SERPL-SCNC: 4.2 MMOL/L (ref 3.5–5)
RBC # BLD AUTO: 4.36 MILLION/UL (ref 4.2–5.4)
SODIUM BLD-SCNC: 141 MMOL/L (ref 135–145)
TOTAL PROTEIN: 7.2 G/DL (ref 6.3–8.2)
TRIGL SERPL-MCNC: 88 MG/DL (ref 30–150)
WBC # BLD AUTO: 4.1 THOUSAND/UL (ref 4.5–11)

## 2023-04-03 ENCOUNTER — PATIENT MESSAGE (OUTPATIENT)
Dept: ADMINISTRATIVE | Facility: HOSPITAL | Age: 58
End: 2023-04-03
Payer: MEDICARE

## 2023-04-25 ENCOUNTER — TELEPHONE (OUTPATIENT)
Dept: NEUROLOGY | Facility: CLINIC | Age: 58
End: 2023-04-25
Payer: MEDICARE

## 2023-04-25 NOTE — TELEPHONE ENCOUNTER
----- Message from Florence Panchal sent at 4/25/2023 10:17 AM CDT -----  Regarding: need more info  Contact: @ [phone]  [facility] drug plan is calling to see if they can get more information on the following RYTARY 36. mg CpSR for the patient a fax was sent over please call and adv @ [phone]

## 2023-05-15 ENCOUNTER — TELEPHONE (OUTPATIENT)
Dept: NEUROSURGERY | Facility: CLINIC | Age: 58
End: 2023-05-15
Payer: MEDICARE

## 2023-05-18 ENCOUNTER — OFFICE VISIT (OUTPATIENT)
Dept: NEUROSURGERY | Facility: CLINIC | Age: 58
End: 2023-05-18
Payer: COMMERCIAL

## 2023-05-18 DIAGNOSIS — G20.A1 PD (PARKINSON'S DISEASE): Primary | Chronic | ICD-10-CM

## 2023-05-18 PROCEDURE — 99205 OFFICE O/P NEW HI 60 MIN: CPT | Mod: 95,,, | Performed by: NEUROLOGICAL SURGERY

## 2023-05-18 PROCEDURE — 99205 PR OFFICE/OUTPT VISIT, NEW, LEVL V, 60-74 MIN: ICD-10-PCS | Mod: 95,,, | Performed by: NEUROLOGICAL SURGERY

## 2023-05-18 NOTE — PROGRESS NOTES
"Neurosurgery  History & Physical    SUBJECTIVE:     Chief Complaint: Parkinson disease    History of Present Illness:  Tika Navarro is a 57 y.o. right-handed female who presents as a referral from Dr. Rao to discuss possible deep brain stimulation therapy for Parkinson disease.     She states that her symptoms began in 2004 with right hand shaking. At first, she was able to control the tremor cognitively, then began requiring medication. She was initially seeing Dr. Chu, then Dr. Padgett, and now Dr. Rao for DBS/on/off testing.     Dykinesia (both when on and off--worse when consuming chocolate and candy), cervical dystonia and right arm/hand dystonia are currently the most bothersome symptoms of the disease.  She is also bothered by her gait, which may be in part due to her 3 hip surgeries (see below).     Her primary goals for surgery are to improve limb dexterity and ability to participate in ADLs. Secondary balance and gait speed improvement.     Osteoporosis on right side of neck and has had a right hip replacement. Would probably do a left-sided generator.     Lives with boyfriend (20 years) and 2 cats. She has been on disability for 2 years, though she was in  for [employer] beforehand. She completed some college.     The patient denies any bleeding, infectious, or anesthetic complications with any previous surgery. She did have to have 3 surgeries for her hip: screws backed out of the initial plate, then the amelie "didn't work," so then she had the hip replacement. She occasionally takes Advil for hip pain.       Review of patient's allergies indicates:   Allergen Reactions    No known drug allergies        Current Outpatient Medications   Medication Sig Dispense Refill    ALPRAZolam (XANAX XR) 0.5 MG Tb24 Take by mouth once daily.      amantadine HCL (SYMMETREL) 100 mg capsule TAKE 1 CAPSULE BY MOUTH TWICE A  capsule 3    EScitalopram oxalate (LEXAPRO) 10 MG tablet Take 1 " tablet (10 mg total) by mouth once daily. 90 tablet 3    nebivoloL (BYSTOLIC) 2.5 MG Tab Take 1 tablet (2.5 mg total) by mouth once daily. 90 tablet 3    rasagiline (AZILECT) 1 mg Tab TAKE 1 TABLET BY MOUTH EVERY DAY 90 tablet 3    RYTARY 36. mg CpSR TAKE BY MOUTH 1 CAPSULE 3 TIMES A  capsule 3     No current facility-administered medications for this visit.       Past Medical History:   Diagnosis Date    Anxiety     Hypertension     Mitral valve prolapse syndrome     Other chronic sinusitis     Parkinson disease 2004    Right tremor type    PONV (postoperative nausea and vomiting)      Past Surgical History:   Procedure Laterality Date    BREAST SURGERY      FRACTURE SURGERY  1/1/2018    Broken hip    JOINT REPLACEMENT  1/1/2018    Hip    TONSILLECTOMY, ADENOIDECTOMY       Family History       Problem Relation (Age of Onset)    Coronary artery disease Father    Diabetes Father, Mother    Hypertension Mother    Neuropathy Mother          Social History     Socioeconomic History    Marital status:    Tobacco Use    Smoking status: Never    Smokeless tobacco: Never   Substance and Sexual Activity    Alcohol use: Yes     Alcohol/week: 1.0 standard drink     Types: 1 Glasses of wine per week    Drug use: Never    Sexual activity: Yes     Partners: Male     Birth control/protection: None   Social History Narrative    Lives with boyfriend       Review of Systems   Constitutional:  Negative for fever.   HENT:  Negative for nosebleeds.    Eyes:  Positive for visual disturbance.   Respiratory:  Negative for shortness of breath.    Cardiovascular:  Negative for chest pain.   Gastrointestinal:  Negative for constipation.   Endocrine: Positive for heat intolerance. Negative for cold intolerance.   Genitourinary:  Negative for difficulty urinating.   Musculoskeletal:  Positive for neck pain.   Skin:  Positive for color change.   Neurological:  Positive for tremors.   Hematological:  Bruises/bleeds easily.    Psychiatric/Behavioral:  Positive for hallucinations. Negative for dysphoric mood. The patient is nervous/anxious.      OBJECTIVE:     Vital Signs     There is no height or weight on file to calculate BMI.      Physical Exam:    Constitutional: She appears well-developed and well-nourished. No distress.     Eyes: EOM are normal.     Skin: Skin displays no rash on extremities. Skin displays no lesions on extremities.     Psych/Behavior: She is alert. She is oriented to person, place, and time.     Musculoskeletal:      Comments: No focal weakness on video     Neurological:        Coordination: She has normal finger to nose coordination.   Dykinesias evident, worse on right     Pulmonary: nonlabored respirations     Hematologic: no bruising noted     Diagnostic Results:  MRI brain 2011 reviewed     ASSESSMENT/PLAN:   Tika Navarro is a 57 y.o. female who presents as a referral from Dr. Rao to discuss possible DBS for Parkinson disease. She will need to be discussed further in interdisciplinary conference, but I suspect it would be best to start with the left brain for right body if we proceed in a staged fashion. I think she will ultimately benefit best from bilateral GPi stimulation, but staged will probably be best because of the degree of her dyskinesias. She will require MRI brain/DBS protocol. She has seen Dr. Monroy.      We had a lengthy discussion about the risks, benefits, and alternatives to deep brain stimulation.  Risks include, but are not limited to, bleeding, pain, infection, scarring, need for further/repeat procedure, failure to slow the progression of neurodegenerative disease symptoms, speech difficulty, balance difficulty, cognitive changes, loss of inhibition, intracranial hemorrhage, venous infarct, seizure, stroke, paralysis, loss of the ability to swim, and death. Hardware-related complications may also occur.  This is an implanted device, meaning that any infection elsewhere in the  body mass be treated immediately to minimize the risk of blood stream infection seeding the device. Should this happen, it is possible that the entire system would require removal.       We also reviewed the work flow employed at [facility] for placement of deep brain stimulation devices.  We discussed that she would be admitted on a Tuesday morning for placement of 5 fiducial screws.  We clarified this would require shaving the entire head.  After the fiducial screws are placed, the patient would be transferred to CT scan to obtain a fiducial registration CT scan.  This would be merged with the already obtained MRI.  The patient would be brought back to the operating room for definitive placement of the DBS lead.  This is to be done while awake. The patient expressed understanding thereof. The following week, the patient would be readmitted for asleep implantation of extension cable and pulse generator on an outpatient basis.  The device would subsequently be programmed by our movement disorders colleagues.     She has plans July 4 weekend--so she would like to proceed with surgery after then. She requests consultation with financial services.       I have encouraged the patient to contact the clinic in interim with any questions, concerns, or adverse clinical change.

## 2023-06-06 ENCOUNTER — PES CALL (OUTPATIENT)
Dept: ADMINISTRATIVE | Facility: CLINIC | Age: 58
End: 2023-06-06
Payer: MEDICARE

## 2023-06-12 RX ORDER — RASAGILINE 1 MG/1
TABLET ORAL
Qty: 90 TABLET | Refills: 3 | Status: SHIPPED | OUTPATIENT
Start: 2023-06-12

## 2023-06-13 ENCOUNTER — TELEPHONE (OUTPATIENT)
Dept: ADMINISTRATIVE | Facility: CLINIC | Age: 58
End: 2023-06-13
Payer: MEDICARE

## 2023-06-16 ENCOUNTER — TELEPHONE (OUTPATIENT)
Dept: NEUROSURGERY | Facility: CLINIC | Age: 58
End: 2023-06-16
Payer: MEDICARE

## 2023-06-16 DIAGNOSIS — G20.A1 PD (PARKINSON'S DISEASE): Primary | ICD-10-CM

## 2023-06-19 ENCOUNTER — TELEPHONE (OUTPATIENT)
Dept: PULMONOLOGY | Facility: CLINIC | Age: 58
End: 2023-06-19
Payer: MEDICARE

## 2023-06-19 NOTE — TELEPHONE ENCOUNTER
----- Message from Manuel Deleon MA sent at 6/19/2023  2:49 PM CDT -----  Contact: Self [phone]    ----- Message -----  From: Kiya Marcano  Sent: 6/19/2023   2:46 PM CDT  To: Pat[facility] Alexander Staff    Would like to receive medical advice.    Would they like a call back or a response via [facility]:  call back    Additional information:  Calling to request a sooner appt if possible.

## 2023-06-19 NOTE — TELEPHONE ENCOUNTER
Mrs Navarro has a appointment with Dr Ibrahim on 6-30-23 and was hoping to come sooner. She also needed clarification about her ct on 6-28-23 and where it was scheduled. I told her if someone cancels I can call her but it probably won't happen. I reminded her about PFTS immediately prior to seeing Dr Ibrahim and she confirmed. Stephani Donato LPN

## 2023-06-28 ENCOUNTER — HOSPITAL ENCOUNTER (OUTPATIENT)
Dept: RADIOLOGY | Facility: HOSPITAL | Age: 58
Discharge: HOME | End: 2023-06-28
Attending: INTERNAL MEDICINE
Payer: MEDICARE

## 2023-06-28 DIAGNOSIS — J98.4 APICAL LUNG SCARRING: ICD-10-CM

## 2023-06-28 DIAGNOSIS — R06.00 DYSPNEA, UNSPECIFIED TYPE: ICD-10-CM

## 2023-06-28 PROCEDURE — 71250 CT THORAX DX C-: CPT | Mod: 26,,, | Performed by: STUDENT IN AN ORGANIZED HEALTH CARE EDUCATION/TRAINING PROGRAM

## 2023-06-28 PROCEDURE — 71250 CT THORAX DX C-: CPT | Mod: TC

## 2023-06-28 PROCEDURE — 71250 CT CHEST WITHOUT CONTRAST: ICD-10-PCS | Mod: 26,,, | Performed by: STUDENT IN AN ORGANIZED HEALTH CARE EDUCATION/TRAINING PROGRAM

## 2023-06-30 ENCOUNTER — LAB VISIT (OUTPATIENT)
Dept: LAB | Facility: HOSPITAL | Age: 58
End: 2023-06-30
Attending: INTERNAL MEDICINE
Payer: MEDICARE

## 2023-06-30 ENCOUNTER — TELEPHONE (OUTPATIENT)
Dept: NEUROSURGERY | Facility: CLINIC | Age: 58
End: 2023-06-30
Payer: MEDICARE

## 2023-06-30 ENCOUNTER — OFFICE VISIT (OUTPATIENT)
Dept: PULMONOLOGY | Facility: CLINIC | Age: 58
End: 2023-06-30
Payer: MEDICARE

## 2023-06-30 ENCOUNTER — TELEPHONE (OUTPATIENT)
Dept: ADMINISTRATIVE | Facility: CLINIC | Age: 58
End: 2023-06-30
Payer: MEDICARE

## 2023-06-30 ENCOUNTER — HOSPITAL ENCOUNTER (OUTPATIENT)
Dept: PULMONOLOGY | Facility: CLINIC | Age: 58
Discharge: HOME | End: 2023-06-30
Payer: MEDICARE

## 2023-06-30 VITALS
WEIGHT: 127.88 LBS | BODY MASS INDEX: 20.55 KG/M2 | DIASTOLIC BLOOD PRESSURE: 78 MMHG | HEIGHT: 66 IN | HEART RATE: 87 BPM | SYSTOLIC BLOOD PRESSURE: 124 MMHG | OXYGEN SATURATION: 97 %

## 2023-06-30 DIAGNOSIS — J98.4 APICAL LUNG SCARRING: ICD-10-CM

## 2023-06-30 DIAGNOSIS — R06.00 DYSPNEA, UNSPECIFIED TYPE: ICD-10-CM

## 2023-06-30 DIAGNOSIS — J32.9 CHRONIC RHINOSINUSITIS: ICD-10-CM

## 2023-06-30 DIAGNOSIS — R05.3 CHRONIC COUGH: ICD-10-CM

## 2023-06-30 DIAGNOSIS — R05.3 CHRONIC COUGH: Primary | ICD-10-CM

## 2023-06-30 DIAGNOSIS — G20.A1 PD (PARKINSON'S DISEASE): Primary | ICD-10-CM

## 2023-06-30 DIAGNOSIS — K21.9 GASTROESOPHAGEAL REFLUX DISEASE, UNSPECIFIED WHETHER ESOPHAGITIS PRESENT: ICD-10-CM

## 2023-06-30 LAB
FEF 25 75 LLN: 1.21
FEF 25 75 PRE REF: 133.2 %
FEF 25 75 REF: 2.34
FEV05 LLN: 1.05
FEV05 REF: 1.9
FEV1 FVC LLN: 67
FEV1 FVC PRE REF: 102.5 %
FEV1 FVC REF: 79
FEV1 LLN: 1.97
FEV1 PRE REF: 115.8 %
FEV1 REF: 2.6
FVC LLN: 2.5
FVC PRE REF: 112.2 %
FVC REF: 3.3
PEF LLN: 4.74
PEF PRE REF: 99.5 %
PEF REF: 6.5
PHYSICIAN COMMENT: NORMAL
PRE FEF 25 75: 3.12 L/S (ref 1.21–3.87)
PRE FET 100: 4.5 SEC
PRE FEV05 REF: 124 %
PRE FEV1 FVC: 81.25 % (ref 67.32–89.44)
PRE FEV1: 3.01 L (ref 1.97–3.2)
PRE FEV5: 2.36 L (ref 1.05–2.76)
PRE FVC: 3.7 L (ref 2.5–4.13)
PRE PEF: 6.47 L/S (ref 4.74–8.27)

## 2023-06-30 PROCEDURE — 86480 TB TEST CELL IMMUN MEASURE: CPT | Performed by: INTERNAL MEDICINE

## 2023-06-30 PROCEDURE — 99214 OFFICE O/P EST MOD 30 MIN: CPT | Mod: S$GLB,,, | Performed by: INTERNAL MEDICINE

## 2023-06-30 PROCEDURE — 99999 PR PBB SHADOW E&M-EST. PATIENT-LVL IV: CPT | Mod: PBBFAC,,, | Performed by: INTERNAL MEDICINE

## 2023-06-30 PROCEDURE — 99214 PR OFFICE/OUTPT VISIT, EST, LEVL IV, 30-39 MIN: ICD-10-PCS | Mod: S$GLB,,, | Performed by: INTERNAL MEDICINE

## 2023-06-30 PROCEDURE — 94010 BREATHING CAPACITY TEST: CPT | Mod: S$GLB,,, | Performed by: INTERNAL MEDICINE

## 2023-06-30 PROCEDURE — 99999 PR PBB SHADOW E&M-EST. PATIENT-LVL IV: ICD-10-PCS | Mod: PBBFAC,,, | Performed by: INTERNAL MEDICINE

## 2023-06-30 PROCEDURE — 94010 BREATHING CAPACITY TEST: ICD-10-PCS | Mod: S$GLB,,, | Performed by: INTERNAL MEDICINE

## 2023-06-30 RX ORDER — LEVODOPA AND CARBIDOPA 145; 36.25 MG/1; MG/1
CAPSULE, EXTENDED RELEASE ORAL
Qty: 270 CAPSULE | Refills: 3 | Status: SHIPPED | OUTPATIENT
Start: 2023-06-30 | End: 2023-10-12 | Stop reason: SDUPTHER

## 2023-06-30 RX ORDER — AZELASTINE HYDROCHLORIDE, FLUTICASONE PROPIONATE 137; 50 UG/1; UG/1
1 SPRAY, METERED NASAL 2 TIMES DAILY
Qty: 23 G | Refills: 0 | Status: SHIPPED | OUTPATIENT
Start: 2023-06-30 | End: 2024-03-04

## 2023-06-30 NOTE — PROGRESS NOTES
Subjective:   Patient ID: Tika Navarro is a 57 y.o. female    Chief Complaint:   Chief Complaint   Patient presents with    Cough       HPI  Tika Navarro is a 57 y.o. female who comes for follow up visit, seen initially on 12/24/2022.  She presented with evaluation of  cough and abnormal CT.  History of HTN, anxiety, right hip replacement MVP. Has right hip pain. Has Parkinson's Disease.    Interval:  Cough persists. It is dry. Worse after meals and when laying down, but also occurs while awake. Reports sinus congestion and nasal drip with need to clear throat daily. No wheezing. Does have reflux as well. She had a modified barium swallow evaluation done on Feb 2, 2023 with no significant finding for aspiration.    Has dyspnea with walking, appears to be related to leg fatigue. She walks up a flight of stairs daily to feed the cat. Does get winded when she gets upstairs.    Reports restless legs. Does seem to act out dreams. She is not on dopamine agonist.        Social Hx:  Born and raised in [place], no prolonged foreign travels, no sick contacts, no incarceration.  Smoking hx: never smoker  Does have second hand/passive smoking exposure.    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    Imaging:     June 2022 at [facility] [place]:    FINDINGS: Heart size is normal.  There is atheromatous disease of the aorta and coronary arteries.  No pathologic mediastinal or axillary lymphadenopathy.  Bilateral apical pleural and parenchymal scarring is present.  There is a 3 mm calcified granuloma within the right major fissure. No infiltrates or suspicious nodules are present.  No pleural effusion or pneumothorax.  Dextroscoliosis thoracic spine is present.  Endplate spondylosis of the upper thoracic spine noted.  No infiltrate bone lesions.  Limited views of the upper abdomen  are unremarkable.     Biapical right apical pleural-parenchymal thickening, scarring or infiltrate, grossly similar when compared to the previous examination.     CT chest 6/28/2023:  Lungs/Pleura: Grossly unchanged bilateral apical scarring parenchyma.  Bilateral punctate calcified granulomas noted.  No suspicious pulmonary nodules.  No new focal opacities or consolidations left basal linear atelectasis.  No pleural effusion.     1. No evidence of acute pulmonary inflammatory process.  2. Unchanged bi-apical scarring tissue.       Objective:   Vitals reviewed   Physical Exam  Constitutional:       Appearance: Normal appearance.   HENT:      Head: Normocephalic and atraumatic.   Cardiovascular:      Rate and Rhythm: Normal rate and regular rhythm.      Pulses: Normal pulses.      Heart sounds: Normal heart sounds.   Pulmonary:      Effort: Pulmonary effort is normal. No respiratory distress.      Breath sounds: No wheezing or rales.   Musculoskeletal:         General: Normal range of motion.      Cervical back: Normal range of motion.   Neurological:      General: No focal deficit present.      Mental Status: She is alert and oriented to person, place, and time.      Comments: Tremors of b/l UE       Assessment:     Problem List Items Addressed This Visit          Pulmonary    Chronic cough - Primary    Relevant Orders    Ambulatory referral/consult to Gastroenterology    QUANTIFERON GOLD TB     Other Visit Diagnoses       Chronic rhinosinusitis        Gastroesophageal reflux disease, unspecified whether esophagitis present        Apical lung scarring        Dyspnea, unspecified type                    Plan:       1. Chronic cough  2. Chronic rhinosinusitis  Suspect related to combination of rhinitis and post nasal drip and GERD. Spirometry today without airflow limitation. MBSS unremarkable  - trial of Dymista 1 spray each nostril BID      3. Gastroesophageal reflux disease, unspecified whether esophagitis  present  - Try Elevation of the head of the bed.  - Refrain from supine position after meals  - Avoidance of meals two to three hours before bedtime  - avoidance or elimination of dietary triggers (caffeine, chocolate, spicy foods, food with high fat content, carbonated beverages, and peppermint)  -  referral to GI    4. Apical lung scarring  CT chest from 6/28/23 with stable findings  Check quantiferon    5. Dyspnea, unspecified type  6. Fatigue, unspecified type  7. PD (Parkinson's disease)  Appears to be related to underlying PD and hip pain  No airflow limitation, no significant lung disease explaining dyspnea    Follow up 4-5 months

## 2023-07-01 LAB
GAMMA INTERFERON BACKGROUND BLD IA-ACNC: 0.01 IU/ML
M TB IFN-G CD4+ BCKGRND COR BLD-ACNC: 0.04 IU/ML
MITOGEN IGNF BCKGRD COR BLD-ACNC: 9.99 IU/ML
TB GOLD PLUS: NEGATIVE
TB2 - NIL: 0.06 IU/ML

## 2023-07-13 ENCOUNTER — PES CALL (OUTPATIENT)
Dept: ADMINISTRATIVE | Facility: CLINIC | Age: 58
End: 2023-07-13
Payer: MEDICARE

## 2023-07-19 DIAGNOSIS — F40.240 CLAUSTROPHOBIA: ICD-10-CM

## 2023-07-19 DIAGNOSIS — G20.A1 PD (PARKINSON'S DISEASE): Primary | ICD-10-CM

## 2023-07-19 RX ORDER — ALPRAZOLAM 1 MG/1
TABLET ORAL
Qty: 1 TABLET | Refills: 0 | Status: SHIPPED | OUTPATIENT
Start: 2023-07-19 | End: 2023-09-21

## 2023-07-20 ENCOUNTER — HOSPITAL ENCOUNTER (OUTPATIENT)
Dept: RADIOLOGY | Facility: HOSPITAL | Age: 58
Discharge: HOME | End: 2023-07-20
Attending: NEUROLOGICAL SURGERY
Payer: MEDICARE

## 2023-07-20 ENCOUNTER — OFFICE VISIT (OUTPATIENT)
Dept: NEUROSURGERY | Facility: CLINIC | Age: 58
End: 2023-07-20
Payer: COMMERCIAL

## 2023-07-20 VITALS
DIASTOLIC BLOOD PRESSURE: 58 MMHG | WEIGHT: 127 LBS | HEIGHT: 66 IN | HEART RATE: 77 BPM | BODY MASS INDEX: 20.41 KG/M2 | SYSTOLIC BLOOD PRESSURE: 112 MMHG

## 2023-07-20 DIAGNOSIS — G20.A1 PD (PARKINSON'S DISEASE): ICD-10-CM

## 2023-07-20 DIAGNOSIS — G20.A1 PARKINSON'S DISEASE: Primary | ICD-10-CM

## 2023-07-20 PROCEDURE — 70553 MRI BRAIN STEM W/O & W/DYE: CPT | Mod: 26,,, | Performed by: RADIOLOGY

## 2023-07-20 PROCEDURE — 70552 MRI BRAIN STEM W/DYE: CPT | Mod: 59,TC

## 2023-07-20 PROCEDURE — 99999 PR PBB SHADOW E&M-EST. PATIENT-LVL IV: CPT | Mod: PBBFAC,,, | Performed by: NEUROLOGICAL SURGERY

## 2023-07-20 PROCEDURE — 25500020 PHARM REV CODE 255: Performed by: NEUROLOGICAL SURGERY

## 2023-07-20 PROCEDURE — 70553 MRI BRAIN W WO CONTRAST: ICD-10-PCS | Mod: 26,,, | Performed by: RADIOLOGY

## 2023-07-20 PROCEDURE — 99214 PR OFFICE/OUTPT VISIT, EST, LEVL IV, 30-39 MIN: ICD-10-PCS | Mod: S$GLB,,, | Performed by: NEUROLOGICAL SURGERY

## 2023-07-20 PROCEDURE — 70552 MRI BRAIN STEALTH W/O FIDUCIALS WITH CONTRAST: ICD-10-PCS | Mod: 26,59,, | Performed by: RADIOLOGY

## 2023-07-20 PROCEDURE — 99214 OFFICE O/P EST MOD 30 MIN: CPT | Mod: S$GLB,,, | Performed by: NEUROLOGICAL SURGERY

## 2023-07-20 PROCEDURE — A9585 GADOBUTROL INJECTION: HCPCS | Performed by: NEUROLOGICAL SURGERY

## 2023-07-20 PROCEDURE — 70553 MRI BRAIN STEM W/O & W/DYE: CPT | Mod: TC

## 2023-07-20 PROCEDURE — 99999 PR PBB SHADOW E&M-EST. PATIENT-LVL IV: ICD-10-PCS | Mod: PBBFAC,,, | Performed by: NEUROLOGICAL SURGERY

## 2023-07-20 PROCEDURE — 70552 MRI BRAIN STEM W/DYE: CPT | Mod: 26,59,, | Performed by: RADIOLOGY

## 2023-07-20 RX ORDER — GADOBUTROL 604.72 MG/ML
6 INJECTION INTRAVENOUS
Status: COMPLETED | OUTPATIENT
Start: 2023-07-20 | End: 2023-07-20

## 2023-07-20 RX ADMIN — GADOBUTROL 6 ML: 604.72 INJECTION INTRAVENOUS at 03:07

## 2023-07-21 NOTE — PROGRESS NOTES
Tika Navarro was seen in neurosurgical consultation at the office this afternoon.  She is a 57-year-old lady who began to note twitching in her hands in 2004.  This gradually progressed and she was diagnosed with Parkinson's disease.  Symptoms were reasonably mild and she was not recommended to start medication until 2010 when rigidity, dystonia and tremor became more severe.  She was on Rytary for a long period of time but has developed severe dyskinesias and is now being treated with Azilect and Symmetrel.  [facility].  Deep brain stimulation has been considered several times but the patient did not feel she was ready for this.  Dyskinesias are now quite severe and she has decided to proceed with the procedure.  Past medical history is generally benign.  She takes Bystolic for hypertension.  She is  and her  is with her today.    On physical examination she is a well-developed, thin white lady who is alert and cooperative.  She is an almost constant dyskinetic movement of the extremities and torso perhaps a little more apparent on the right.  She does not show much tremor.  Extraocular movements are good and pupils equal.  Speech is generally clear and she provides a good history.  There is not a lot of rigidity.  I did not have her walk today.    Impression:  Parkinson's disease.      Recommendation:  I discussed deep brain stimulation with her.  Parkinson's disease was initially treated with surgical procedures and then after 1960 with dopamine analogs.  Surgical procedures were again resumed then in 1997 deep brain stimulation was approved by the FDA for surgical management of Parkinson's disease symptoms.  The electrodes are placed with stereotaxy using MRI to find the desired location or target of the electrode and CT scan with temporary fiducial screws for the reference frame.  She is awake for the procedure.  Microelectrode  recording and micro stimulation or done during the procedure to find the best location and avoid side effects.  The electrode is capped into place and she will return the following week for insertion of the pulse generator.  Surgery is planned for next month.

## 2023-07-24 ENCOUNTER — TELEPHONE (OUTPATIENT)
Dept: NEUROSURGERY | Facility: CLINIC | Age: 58
End: 2023-07-24
Payer: MEDICARE

## 2023-07-24 DIAGNOSIS — G20.A1 PARKINSON'S DISEASE: Primary | ICD-10-CM

## 2023-08-06 NOTE — PROGRESS NOTES
Tika Parkerdavid presented for an initial Medicare AWV today. The following components were reviewed and updated:  Very pleasant lady.     Medical history  Family History  Social history  Allergies and Current Medications  Health Risk Assessment  Health Maintenance  Care Team    **See Completed Assessments for Annual Wellness visit with in the encounter summary    The following assessments were completed:  Depression Screening  Cognitive function Screening        Timed Get Up Test  Whisper Test  Wt Readings from Last 3 Encounters:   08/11/23 57.3 kg (126 lb 5.2 oz)   07/20/23 57.6 kg (127 lb)   06/30/23 58 kg (127 lb 13.9 oz)     Temp Readings from Last 3 Encounters:   03/15/23 98 °F (36.7 °C) (Oral)   06/03/22 98.4 °F (36.9 °C)   06/27/19 98.7 °F (37.1 °C) (Oral)     BP Readings from Last 3 Encounters:   08/11/23 130/82   07/20/23 (!) 112/58   06/30/23 124/78     Pulse Readings from Last 3 Encounters:   08/11/23 75   07/20/23 77   06/30/23 87     General: Well developed, well nourished. No distress.  HEENT: Head is normocephalic, atraumatic  Eyes: Clear conjunctiva.  Neck: Supple, symmetrical neck; trachea midline.  Extremities: No LE edema. Pulses 2+ and symmetric.   Skin: Skin color, texture, turgor normal. No rashes.  Musculoskeletal: Normal gait.   Neurologic:  No focal numbness or weakness. Resting tremors.      Diagnoses and health risks identified today and associated recommendations/orders:  1 Encounter for preventive health examination  Here for Health Risk Assessment  / AWV. Health maintenance reviewed and updated. Follow up in one year.     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 Followed by NS, Neuro and PCP    3 PD (Parkinson's disease)  Chronic, under care of Neurology and Neuro Surg, pending procedure in 10/2023. Followed by NS, Neuro and PCP    4 Generalized anxiety disorder  Chronic, stable on  "current regimen. Followed by PCP    5 Borderline hyperlipidemia  Lab Results   Component Value Date    CHOL 244 (H) 03/29/2023     Lab Results   Component Value Date     (H) 03/29/2023     Lab Results   Component Value Date    LDLCALC 109 03/29/2023     No results found for: "DLDL"  Lab Results   Component Value Date    TRIG 88 03/29/2023   Chronic, followed by PcP    6 Benign essential hypertension  BP Readings from Last 3 Encounters:   08/11/23 130/82   07/20/23 (!) 112/58   06/30/23 124/78   Chronic, stable on current pharm regimen. Followed by PCP    7 Abnormality of gait and mobility  Chronic, stable. Followed by her Neurology team and PcP at this time.     8 Other reduced mobility  Chronic, stable. Followed by her Neurology team and PcP at this time.     Provided Tika P with a 5-10 year written screening schedule and personal prevention plan. Recommendations were developed using the USPSTF age appropriate recommendations. Education, counseling, and referrals were provided as needed.  After Visit Summary printed and given to patient which includes a list of additional screenings\tests needed.    Future Appointments   Date Time Provider Department Center   9/21/2023  2:00 PM Barrera Darling MD [facility]        Medication List            Accurate as of August 11, 2023 12:23 PM. If you have any questions, ask your nurse or doctor.                CONTINUE taking these medications      * ALPRAZolam 0.5 MG Tb24  Commonly known as: XANAX XR     * ALPRAZolam 1 MG tablet  Commonly known as: XANAX  TAKE 1 TABLET BY MOUTH 45 MINUTES PRIOR TO MRI FOR CLAUSTROPHOBIA.     amantadine  mg capsule  Commonly known as: SYMMETREL  TAKE 1 CAPSULE BY MOUTH TWICE A DAY     azelastine-fluticasone 137-50 mcg/spray Spry nassal spray  Commonly known as: DYMISTA  1 spray by Each Nostril route 2 (two) times daily.     EScitalopram oxalate 10 MG tablet  Commonly known as: LEXAPRO  Take 1 tablet (10 mg " total) by mouth once daily.     nebivoloL 2.5 MG Tab  Commonly known as: BYSTOLIC  Take 1 tablet (2.5 mg total) by mouth once daily.     rasagiline 1 mg Tab  Commonly known as: AZILECT  TAKE 1 TABLET BY MOUTH EVERY DAY     RYTARY 36. mg Cpsr  Generic drug: carbidopa-levodopa  TAKE BY MOUTH 1 CAPSULE 3 TIMES A DAY           * This list has 2 medication(s) that are the same as other medications prescribed for you. Read the directions carefully, and ask your doctor or other care provider to review them with you.                MANASA Glez      I offered to discuss advanced care planning, including how to pick a person who would make decisions for you if you were unable to make them for yourself, called a health care power of , and what kind of decisions you might make such as use of life sustaining treatments such as ventilators and tube feeding when faced with a life limiting illness recorded on a living will that they will need to know. (How you want to be cared for as you near the end of your natural life)     X Patient is interested in learning more about how to make advanced directives.  I provided them paperwork and offered to discuss this with them. (Boyfriend)

## 2023-08-09 ENCOUNTER — TELEPHONE (OUTPATIENT)
Dept: ADMINISTRATIVE | Facility: CLINIC | Age: 58
End: 2023-08-09
Payer: MEDICARE

## 2023-08-11 ENCOUNTER — OFFICE VISIT (OUTPATIENT)
Dept: INTERNAL MEDICINE | Facility: CLINIC | Age: 58
End: 2023-08-11
Payer: MEDICARE

## 2023-08-11 VITALS
WEIGHT: 126.31 LBS | OXYGEN SATURATION: 98 % | HEIGHT: 66 IN | SYSTOLIC BLOOD PRESSURE: 130 MMHG | HEART RATE: 75 BPM | BODY MASS INDEX: 20.3 KG/M2 | DIASTOLIC BLOOD PRESSURE: 82 MMHG

## 2023-08-11 DIAGNOSIS — F06.70 MILD NEUROCOGNITIVE DISORDER DUE TO PARKINSON'S DISEASE: Chronic | ICD-10-CM

## 2023-08-11 DIAGNOSIS — G20.A1 PD (PARKINSON'S DISEASE): Chronic | ICD-10-CM

## 2023-08-11 DIAGNOSIS — E78.5 BORDERLINE HYPERLIPIDEMIA: ICD-10-CM

## 2023-08-11 DIAGNOSIS — F41.1 GENERALIZED ANXIETY DISORDER: Chronic | ICD-10-CM

## 2023-08-11 DIAGNOSIS — I10 BENIGN ESSENTIAL HYPERTENSION: ICD-10-CM

## 2023-08-11 DIAGNOSIS — R26.9 ABNORMALITY OF GAIT AND MOBILITY: ICD-10-CM

## 2023-08-11 DIAGNOSIS — Z00.00 ENCOUNTER FOR PREVENTIVE HEALTH EXAMINATION: Primary | ICD-10-CM

## 2023-08-11 DIAGNOSIS — Z74.09 OTHER REDUCED MOBILITY: ICD-10-CM

## 2023-08-11 DIAGNOSIS — G20.A1 MILD NEUROCOGNITIVE DISORDER DUE TO PARKINSON'S DISEASE: Chronic | ICD-10-CM

## 2023-08-11 PROCEDURE — 1160F PR REVIEW ALL MEDS BY PRESCRIBER/CLIN PHARMACIST DOCUMENTED: ICD-10-PCS | Mod: CPTII,S$GLB,, | Performed by: NURSE PRACTITIONER

## 2023-08-11 PROCEDURE — 1159F PR MEDICATION LIST DOCUMENTED IN MEDICAL RECORD: ICD-10-PCS | Mod: CPTII,S$GLB,, | Performed by: NURSE PRACTITIONER

## 2023-08-11 PROCEDURE — 3008F PR BODY MASS INDEX (BMI) DOCUMENTED: ICD-10-PCS | Mod: CPTII,S$GLB,, | Performed by: NURSE PRACTITIONER

## 2023-08-11 PROCEDURE — 99999 PR PBB SHADOW E&M-EST. PATIENT-LVL IV: CPT | Mod: PBBFAC,,, | Performed by: NURSE PRACTITIONER

## 2023-08-11 PROCEDURE — 99999 PR PBB SHADOW E&M-EST. PATIENT-LVL IV: ICD-10-PCS | Mod: PBBFAC,,, | Performed by: NURSE PRACTITIONER

## 2023-08-11 PROCEDURE — G0439 PR MEDICARE ANNUAL WELLNESS SUBSEQUENT VISIT: ICD-10-PCS | Mod: S$GLB,,, | Performed by: NURSE PRACTITIONER

## 2023-08-11 PROCEDURE — 1160F RVW MEDS BY RX/DR IN RCRD: CPT | Mod: CPTII,S$GLB,, | Performed by: NURSE PRACTITIONER

## 2023-08-11 PROCEDURE — 3008F BODY MASS INDEX DOCD: CPT | Mod: CPTII,S$GLB,, | Performed by: NURSE PRACTITIONER

## 2023-08-11 PROCEDURE — 3075F SYST BP GE 130 - 139MM HG: CPT | Mod: CPTII,S$GLB,, | Performed by: NURSE PRACTITIONER

## 2023-08-11 PROCEDURE — 3075F PR MOST RECENT SYSTOLIC BLOOD PRESS GE 130-139MM HG: ICD-10-PCS | Mod: CPTII,S$GLB,, | Performed by: NURSE PRACTITIONER

## 2023-08-11 PROCEDURE — 3079F PR MOST RECENT DIASTOLIC BLOOD PRESSURE 80-89 MM HG: ICD-10-PCS | Mod: CPTII,S$GLB,, | Performed by: NURSE PRACTITIONER

## 2023-08-11 PROCEDURE — G0439 PPPS, SUBSEQ VISIT: HCPCS | Mod: S$GLB,,, | Performed by: NURSE PRACTITIONER

## 2023-08-11 PROCEDURE — 3079F DIAST BP 80-89 MM HG: CPT | Mod: CPTII,S$GLB,, | Performed by: NURSE PRACTITIONER

## 2023-08-11 PROCEDURE — 1159F MED LIST DOCD IN RCRD: CPT | Mod: CPTII,S$GLB,, | Performed by: NURSE PRACTITIONER

## 2023-08-11 NOTE — PATIENT INSTRUCTIONS
Counseling and Referral of Other Preventative  (Italic type indicates deductible and co-insurance are waived)    Patient Name: Tika Navarro  Today's Date: 8/11/2023    Health Maintenance       Date Due Completion Date    Hepatitis C Screening Never done ---    Pneumococcal Vaccines (Age 0-64) (1 - PCV) Never done ---    HIV Screening Never done ---    TETANUS VACCINE Never done ---    Colorectal Cancer Screening Never done ---    Shingles Vaccine (1 of 2) Never done ---    COVID-19 Vaccine (3 - Pfizer series) 11/13/2021 9/18/2021    Influenza Vaccine (1) 09/01/2023 ---    Mammogram 05/24/2024 5/24/2023    Cervical Cancer Screening 04/26/2026 4/26/2023    Lipid Panel 03/29/2028 3/29/2023        No orders of the defined types were placed in this encounter.    The following information is provided to all patients.  This information is to help you find resources for any of the problems found today that may be affecting your health:                Living healthy guide: www.[facility].gov      Understanding Diabetes: www.diabetes.org      Eating healthy: www.cdc.gov/healthyweight      CDC home safety checklist: www.cdc.gov/steadi/patient.html      Agency on Aging: www.goea.[place].gov      Alcoholics anonymous (AA): www.aa.org      Physical Activity: www.leesa.nih.gov/lw3lcad      Tobacco use: www.quitwithusla.org

## 2023-08-18 ENCOUNTER — TELEPHONE (OUTPATIENT)
Dept: NEUROSURGERY | Facility: CLINIC | Age: 58
End: 2023-08-18
Payer: MEDICARE

## 2023-08-18 DIAGNOSIS — G20.A1 PARKINSON'S DISEASE: Primary | ICD-10-CM

## 2023-08-18 RX ORDER — DIAZEPAM 5 MG/1
TABLET ORAL
Qty: 1 TABLET | Refills: 0 | Status: SHIPPED | OUTPATIENT
Start: 2023-08-18 | End: 2023-09-21

## 2023-08-28 ENCOUNTER — PATIENT MESSAGE (OUTPATIENT)
Dept: SURGERY | Facility: HOSPITAL | Age: 58
End: 2023-08-28
Payer: MEDICARE

## 2023-09-12 ENCOUNTER — HOSPITAL ENCOUNTER (OUTPATIENT)
Dept: RADIOLOGY | Facility: HOSPITAL | Age: 58
Discharge: HOME | End: 2023-09-12
Attending: NEUROLOGICAL SURGERY
Payer: MEDICARE

## 2023-09-12 DIAGNOSIS — G20.A1 PARKINSON'S DISEASE: ICD-10-CM

## 2023-09-12 PROCEDURE — 70553 MRI BRAIN STEM W/O & W/DYE: CPT | Mod: TC

## 2023-09-12 PROCEDURE — 70553 MRI BRAIN W WO CONTRAST: ICD-10-PCS | Mod: 26,,, | Performed by: RADIOLOGY

## 2023-09-12 PROCEDURE — A9585 GADOBUTROL INJECTION: HCPCS | Performed by: NEUROLOGICAL SURGERY

## 2023-09-12 PROCEDURE — 25500020 PHARM REV CODE 255: Performed by: NEUROLOGICAL SURGERY

## 2023-09-12 PROCEDURE — 70553 MRI BRAIN STEM W/O & W/DYE: CPT | Mod: 26,,, | Performed by: RADIOLOGY

## 2023-09-12 RX ORDER — GADOBUTROL 604.72 MG/ML
6 INJECTION INTRAVENOUS
Status: COMPLETED | OUTPATIENT
Start: 2023-09-12 | End: 2023-09-12

## 2023-09-12 RX ADMIN — GADOBUTROL 6 ML: 604.72 INJECTION INTRAVENOUS at 06:09

## 2023-09-13 ENCOUNTER — TELEPHONE (OUTPATIENT)
Dept: PRIMARY CARE CLINIC | Facility: CLINIC | Age: 58
End: 2023-09-13
Payer: MEDICARE

## 2023-09-13 NOTE — TELEPHONE ENCOUNTER
----- Message from Alice Nobles sent at 9/13/2023  9:52 AM CDT -----  Contact: [phone]  Patient called, would like a call back from nurse in regards patient upcoming visit on 09/21/23. Thank you.

## 2023-09-13 NOTE — TELEPHONE ENCOUNTER
Patient aware to bring her paperwork with her at the time of her visit , to be cleared for surgery.

## 2023-09-18 ENCOUNTER — TELEPHONE (OUTPATIENT)
Dept: PRIMARY CARE CLINIC | Facility: CLINIC | Age: 58
End: 2023-09-18
Payer: MEDICARE

## 2023-09-18 DIAGNOSIS — F41.9 ANXIETY: Primary | ICD-10-CM

## 2023-09-18 RX ORDER — HYDROXYZINE PAMOATE 25 MG/1
25 CAPSULE ORAL EVERY 8 HOURS PRN
Qty: 30 CAPSULE | Refills: 1 | Status: SHIPPED | OUTPATIENT
Start: 2023-09-18 | End: 2024-03-04

## 2023-09-18 NOTE — TELEPHONE ENCOUNTER
Pt states she is coming in to see you on 9/21 for a preop but has been having a lot of anxiety lately and not sleeping. Pt unsure if it has anything to do with her Surgery. Pt states she used to be on Xanax 0.5 mg. Informed pt she will probably have to wait until her upcoming appt but she requested me send to you anyways. Please advise.

## 2023-09-18 NOTE — TELEPHONE ENCOUNTER
----- Message from Isabel Hinson sent at 9/18/2023  2:33 PM CDT -----  Contact: [phone] Patient  Pt is calling in regards to having a question about her appointment on 09/21/2023. Pt is asking if the nurse can call her back please. Please call and advise. Thank you. Pt gave no other information.

## 2023-09-21 ENCOUNTER — TELEPHONE (OUTPATIENT)
Dept: NEUROSURGERY | Facility: CLINIC | Age: 58
End: 2023-09-21
Payer: MEDICARE

## 2023-09-21 ENCOUNTER — TELEPHONE (OUTPATIENT)
Dept: PRIMARY CARE CLINIC | Facility: CLINIC | Age: 58
End: 2023-09-21

## 2023-09-21 ENCOUNTER — OFFICE VISIT (OUTPATIENT)
Dept: PRIMARY CARE CLINIC | Facility: CLINIC | Age: 58
End: 2023-09-21
Payer: COMMERCIAL

## 2023-09-21 VITALS
DIASTOLIC BLOOD PRESSURE: 80 MMHG | RESPIRATION RATE: 18 BRPM | WEIGHT: 128.5 LBS | TEMPERATURE: 97 F | SYSTOLIC BLOOD PRESSURE: 120 MMHG | HEART RATE: 64 BPM | BODY MASS INDEX: 20.65 KG/M2 | HEIGHT: 66 IN | OXYGEN SATURATION: 99 %

## 2023-09-21 DIAGNOSIS — G20.A1 PD (PARKINSON'S DISEASE): Chronic | ICD-10-CM

## 2023-09-21 DIAGNOSIS — F40.240 CLAUSTROPHOBIA: ICD-10-CM

## 2023-09-21 DIAGNOSIS — G20.A1 PARKINSON'S DISEASE: Primary | ICD-10-CM

## 2023-09-21 DIAGNOSIS — I70.0 AORTIC ATHEROSCLEROSIS: Primary | ICD-10-CM

## 2023-09-21 DIAGNOSIS — I10 BENIGN ESSENTIAL HYPERTENSION: ICD-10-CM

## 2023-09-21 DIAGNOSIS — Z01.818 PREOP EXAM FOR INTERNAL MEDICINE: ICD-10-CM

## 2023-09-21 DIAGNOSIS — F41.1 GENERALIZED ANXIETY DISORDER: Chronic | ICD-10-CM

## 2023-09-21 PROCEDURE — 93010 ELECTROCARDIOGRAM REPORT: CPT | Mod: S$GLB,,, | Performed by: INTERNAL MEDICINE

## 2023-09-21 PROCEDURE — 99214 PR OFFICE/OUTPT VISIT, EST, LEVL IV, 30-39 MIN: ICD-10-PCS | Mod: S$GLB,,, | Performed by: INTERNAL MEDICINE

## 2023-09-21 PROCEDURE — 99214 OFFICE O/P EST MOD 30 MIN: CPT | Mod: S$GLB,,, | Performed by: INTERNAL MEDICINE

## 2023-09-21 PROCEDURE — 99999 PR PBB SHADOW E&M-EST. PATIENT-LVL IV: ICD-10-PCS | Mod: PBBFAC,,, | Performed by: INTERNAL MEDICINE

## 2023-09-21 PROCEDURE — 99999 PR PBB SHADOW E&M-EST. PATIENT-LVL IV: CPT | Mod: PBBFAC,,, | Performed by: INTERNAL MEDICINE

## 2023-09-21 PROCEDURE — 93010 EKG 12-LEAD: ICD-10-PCS | Mod: S$GLB,,, | Performed by: INTERNAL MEDICINE

## 2023-09-21 PROCEDURE — 93005 EKG 12-LEAD: ICD-10-PCS | Mod: S$GLB,,, | Performed by: INTERNAL MEDICINE

## 2023-09-21 PROCEDURE — 93005 ELECTROCARDIOGRAM TRACING: CPT | Mod: S$GLB,,, | Performed by: INTERNAL MEDICINE

## 2023-09-21 RX ORDER — ALPRAZOLAM 0.25 MG/1
0.25 TABLET ORAL 2 TIMES DAILY PRN
Qty: 20 TABLET | Refills: 0 | Status: SHIPPED | OUTPATIENT
Start: 2023-09-21

## 2023-09-21 NOTE — PROGRESS NOTES
[facility] Note    Chief Complaint      Chief Complaint   Patient presents with    Pre-op Exam       History of Present Illness      Tika Navarro is a 57 y.o. female who presents today for   Chief Complaint   Patient presents with    Pre-op Exam   .  Patient comes to appointment here for peop visit for deep brain stimulation procedure with Dr Chuck Carver . She ahs good functional staus can walk a block and go up steps with no chest pain . Ekg done today no st t changes noted ,. .    Problem List Items Addressed This Visit          Neuro    PD (Parkinson's disease) (Chronic)    Overview     neruop managing dr abigail interiano current . Will be having deep brain stimulation procedure with dr chuck carver 9/26/23 .             Psychiatric    Generalized anxiety disorder (Chronic)    Overview     Is especially anxious in anticipation of surgery . Will refill her xanax . She is aware for short term only .  reviewed             Cardiac/Vascular    Benign essential hypertension    Overview     bp well controlled in current regimen   ekg no acute st t changes          Aortic atherosclerosis - Primary       Other    Preop exam for internal medicine    Overview     See hpi cleared for procedure with low risk cardiac complications           Other Visit Diagnoses       Claustrophobia                  Past Medical History:  Past Medical History:   Diagnosis Date    Anxiety     Hypertension     Mitral valve prolapse syndrome     Other chronic sinusitis     Parkinson disease 2004    Right tremor type    PONV (postoperative nausea and vomiting)        Past Surgical History:  Past Surgical History:   Procedure Laterality Date    BREAST SURGERY      FRACTURE SURGERY  1/1/2018    Broken hip    JOINT REPLACEMENT  1/1/2018    Hip    TONSILLECTOMY, ADENOIDECTOMY         Family History:  family history includes Coronary artery disease in her father; Diabetes in her father and mother; Hypertension in her  mother; Neuropathy in her mother.    Social History:  Social History     Socioeconomic History    Marital status:    Tobacco Use    Smoking status: Never    Smokeless tobacco: Never   Substance and Sexual Activity    Alcohol use: Yes     Alcohol/week: 1.0 standard drink of alcohol     Types: 1 Glasses of wine per week    Drug use: Never    Sexual activity: Yes     Partners: Male     Birth control/protection: None   Social History Narrative    Lives with boyfriend     Social Determinants of Health     Financial Resource Strain: Low Risk  (8/11/2023)    Overall Financial Resource Strain (CARDIA)     Difficulty of Paying Living Expenses: Not hard at all   Food Insecurity: No Food Insecurity (8/11/2023)    Hunger Vital Sign     Worried About Running Out of Food in the Last Year: Never true     Ran Out of Food in the Last Year: Never true   Transportation Needs: No Transportation Needs (8/11/2023)    PRAPARE - Transportation     Lack of Transportation (Medical): No     Lack of Transportation (Non-Medical): No   Physical Activity: Inactive (8/11/2023)    Exercise Vital Sign     Days of Exercise per Week: 0 days     Minutes of Exercise per Session: 0 min   Stress: Stress Concern Present (8/11/2023)    [employer] [place] [employer] - Occupational Stress Questionnaire     Feeling of Stress : Very much   Social Connections: Moderately Isolated (8/11/2023)    Social Connection and Isolation Panel [NHANES]     Frequency of Communication with Friends and Family: More than three times a week     Frequency of Social Gatherings with Friends and Family: More than three times a week     Attends Jehovah's witness Services: Never     Active Member of Clubs or Organizations: No     Attends Club or Organization Meetings: Never     Marital Status: Living with partner   Housing Stability: Unknown (8/11/2023)    Housing Stability Vital Sign     Unable to Pay for Housing in the Last Year: No     Unstable Housing in the Last Year:  No       Review of Systems:   Review of Systems   Constitutional:  Negative for fever and weight loss.   HENT:  Negative for congestion, hearing loss and sore throat.    Eyes:  Negative for blurred vision.   Respiratory:  Negative for cough and shortness of breath.    Cardiovascular:  Negative for chest pain, palpitations, claudication and leg swelling.   Gastrointestinal:  Negative for abdominal pain, constipation, diarrhea and heartburn.   Genitourinary:  Negative for dysuria.   Musculoskeletal:  Negative for back pain and myalgias.   Skin:  Negative for rash.   Neurological:  Negative for focal weakness and headaches.   Psychiatric/Behavioral:  Negative for depression and suicidal ideas. The patient is not nervous/anxious.          Medications:  Outpatient Encounter Medications as of 9/21/2023   Medication Sig Note Dispense Refill    amantadine HCL (SYMMETREL) 100 mg capsule TAKE 1 CAPSULE BY MOUTH TWICE A DAY  180 capsule 3    EScitalopram oxalate (LEXAPRO) 10 MG tablet Take 1 tablet (10 mg total) by mouth once daily.  90 tablet 3    hydrOXYzine pamoate (VISTARIL) 25 MG Cap Take 1 capsule (25 mg total) by mouth every 8 (eight) hours as needed (anxiety).  30 capsule 1    nebivoloL (BYSTOLIC) 2.5 MG Tab Take 1 tablet (2.5 mg total) by mouth once daily.  90 tablet 3    rasagiline (AZILECT) 1 mg Tab TAKE 1 TABLET BY MOUTH EVERY DAY  90 tablet 3    RYTARY 36. mg CpSR TAKE BY MOUTH 1 CAPSULE 3 TIMES A DAY  270 capsule 3    [DISCONTINUED] ALPRAZolam (XANAX XR) 0.5 MG Tb24 Take by mouth once daily. 12/23/2015: May take as prescribed      ALPRAZolam (XANAX) 0.25 MG tablet Take 1 tablet (0.25 mg total) by mouth 2 (two) times daily as needed for Anxiety.  20 tablet 0    azelastine-fluticasone (DYMISTA) 137-50 mcg/spray Spry nassal spray 1 spray by Each Nostril route 2 (two) times daily. (Patient not taking: Reported on 7/20/2023)  23 g 0    [DISCONTINUED] ALPRAZolam (XANAX) 1 MG tablet TAKE 1 TABLET BY MOUTH 45  "MINUTES PRIOR TO MRI FOR CLAUSTROPHOBIA. (Patient not taking: Reported on 9/21/2023)  1 tablet 0    [DISCONTINUED] diazePAM (VALIUM) 5 MG tablet TAKE ONE TABLET BY MOUTH 30 MINUTES PRIOR TO MRI. (Patient not taking: Reported on 9/21/2023)  1 tablet 0     No facility-administered encounter medications on file as of 9/21/2023.        Allergies:  Review of patient's allergies indicates:   Allergen Reactions    No known drug allergies          Physical Exam         Vitals:    09/21/23 1354   BP: 120/80   Pulse: 64   Resp: 18   Temp: 97 °F (36.1 °C)         Physical Exam  Constitutional:       Appearance: She is well-developed.   Eyes:      Pupils: Pupils are equal, round, and reactive to light.   Neck:      Thyroid: No thyromegaly.   Cardiovascular:      Rate and Rhythm: Normal rate.      Heart sounds: Normal heart sounds. No murmur heard.     No friction rub. No gallop.   Pulmonary:      Breath sounds: Normal breath sounds.   Abdominal:      General: Bowel sounds are normal.      Palpations: Abdomen is soft.   Musculoskeletal:         General: Normal range of motion.      Cervical back: Normal range of motion.   Lymphadenopathy:      Cervical: No cervical adenopathy.   Skin:     General: Skin is warm.      Findings: No rash.   Neurological:      Mental Status: She is alert and oriented to person, place, and time.      Cranial Nerves: No cranial nerve deficit.   Psychiatric:         Behavior: Behavior normal.          Laboratory:  CBC:  No results for input(s): "WBC", "RBC", "HGB", "HCT", "PLT", "MCV", "MCH", "MCHC" in the last 2160 hours.  CMP:  No results for input(s): "GLU", "CALCIUM", "ALBUMIN", "PROT", "NA", "K", "CO2", "CL", "BUN", "ALKPHOS", "ALT", "AST", "BILITOT" in the last 2160 hours.    Invalid input(s): "CREATININ"  URINALYSIS:  No results for input(s): "COLORU", "CLARITYU", "SPECGRAV", "PHUR", "PROTEINUA", "GLUCOSEU", "BILIRUBINCON", "BLOODU", "WBCU", "RBCU", "BACTERIA", "MUCUS", "NITRITE", "LEUKOCYTESUR", " ""UROBILINOGEN", "HYALINECASTS" in the last 2160 hours.   LIPIDS:  No results for input(s): "TSH", "HDL", "CHOL", "TRIG", "LDLCALC", "CHOLHDL", "NONHDLCHOL", "TOTALCHOLEST" in the last 2160 hours.  TSH:  No results for input(s): "TSH" in the last 2160 hours.  A1C:  No results for input(s): "HGBA1C" in the last 2160 hours.    Radiology:        Assessment:     Tika Navarro is a 57 y.o.female with:    Aortic atherosclerosis    Benign essential hypertension  -     IN OFFICE EKG 12-LEAD (to [place])    Preop exam for internal medicine    PD (Parkinson's disease)  -     ALPRAZolam (XANAX) 0.25 MG tablet; Take 1 tablet (0.25 mg total) by mouth 2 (two) times daily as needed for Anxiety.  Dispense: 20 tablet; Refill: 0    Generalized anxiety disorder    Claustrophobia  -     ALPRAZolam (XANAX) 0.25 MG tablet; Take 1 tablet (0.25 mg total) by mouth 2 (two) times daily as needed for Anxiety.  Dispense: 20 tablet; Refill: 0          Plan:     Problem List Items Addressed This Visit          Neuro    PD (Parkinson's disease) (Chronic)    Overview     neruop managing dr abigail gorman . Will be having deep brain stimulation procedure with dr miriam carver 9/26/23 .             Psychiatric    Generalized anxiety disorder (Chronic)    Overview     Is especially anxious in anticipation of surgery . Will refill her xanax . She is aware for short term only .  reviewed             Cardiac/Vascular    Benign essential hypertension    Overview     bp well controlled in current regimen   ekg no acute st t changes          Aortic atherosclerosis - Primary       Other    Preop exam for internal medicine    Overview     See hpi cleared for procedure with low risk cardiac complications           Other Visit Diagnoses       Claustrophobia                As above, continue current medications and maintain follow up with specialists.  Return to clinic as scheduled   Barrera Darling.sig [facility]

## 2023-09-21 NOTE — TELEPHONE ENCOUNTER
----- Message from Arnoldo Locke sent at 9/21/2023  3:21 PM CDT -----  Contact: Pt [phone]  She said she needed labs drawn for the preop but, there are no orders in epic.    Thank you

## 2023-09-21 NOTE — LETTER
[facility]      Barrera Darling MD              [facility]  [address] [facility] [address]                                                                                                     Re: Name: Tika Navarro          : 1965        Tikamaggy Navarro has been examined by me on 2023, and appears to be physically well for       __x_ General Anesthesia       ___ Sports Participation     ___ School /      ___ Camp        Sincerely,       Barrera Darling MD

## 2023-09-22 ENCOUNTER — LAB VISIT (OUTPATIENT)
Dept: LAB | Facility: HOSPITAL | Age: 58
End: 2023-09-22
Attending: NEUROLOGICAL SURGERY
Payer: MEDICARE

## 2023-09-22 DIAGNOSIS — G20.A1 PARKINSON'S DISEASE: ICD-10-CM

## 2023-09-22 LAB
ANION GAP SERPL CALC-SCNC: 8 MMOL/L (ref 8–16)
APTT PPP: 24.5 SEC (ref 21–32)
BASOPHILS # BLD AUTO: 0.08 K/UL (ref 0–0.2)
BASOPHILS NFR BLD: 1.9 % (ref 0–1.9)
BUN SERPL-MCNC: 11 MG/DL (ref 6–20)
CALCIUM SERPL-MCNC: 9.5 MG/DL (ref 8.7–10.5)
CHLORIDE SERPL-SCNC: 101 MMOL/L (ref 95–110)
CO2 SERPL-SCNC: 30 MMOL/L (ref 23–29)
CREAT SERPL-MCNC: 0.9 MG/DL (ref 0.5–1.4)
DIFFERENTIAL METHOD: ABNORMAL
EOSINOPHIL # BLD AUTO: 0.2 K/UL (ref 0–0.5)
EOSINOPHIL NFR BLD: 3.6 % (ref 0–8)
ERYTHROCYTE [DISTWIDTH] IN BLOOD BY AUTOMATED COUNT: 12.1 % (ref 11.5–14.5)
EST. GFR  (NO RACE VARIABLE): >60 ML/MIN/1.73 M^2
GLUCOSE SERPL-MCNC: 126 MG/DL (ref 70–110)
HCT VFR BLD AUTO: 42.2 % (ref 37–48.5)
HGB BLD-MCNC: 13.6 G/DL (ref 12–16)
IMM GRANULOCYTES # BLD AUTO: 0.01 K/UL (ref 0–0.04)
IMM GRANULOCYTES NFR BLD AUTO: 0.2 % (ref 0–0.5)
INR PPP: 1 (ref 0.8–1.2)
LYMPHOCYTES # BLD AUTO: 1.4 K/UL (ref 1–4.8)
LYMPHOCYTES NFR BLD: 32.9 % (ref 18–48)
MCH RBC QN AUTO: 32.3 PG (ref 27–31)
MCHC RBC AUTO-ENTMCNC: 32.2 G/DL (ref 32–36)
MCV RBC AUTO: 100 FL (ref 82–98)
MONOCYTES # BLD AUTO: 0.5 K/UL (ref 0.3–1)
MONOCYTES NFR BLD: 11.9 % (ref 4–15)
NEUTROPHILS # BLD AUTO: 2 K/UL (ref 1.8–7.7)
NEUTROPHILS NFR BLD: 49.5 % (ref 38–73)
NRBC BLD-RTO: 0 /100 WBC
PLATELET # BLD AUTO: 225 K/UL (ref 150–450)
PMV BLD AUTO: 11.5 FL (ref 9.2–12.9)
POTASSIUM SERPL-SCNC: 4.7 MMOL/L (ref 3.5–5.1)
PROTHROMBIN TIME: 10.4 SEC (ref 9–12.5)
RBC # BLD AUTO: 4.21 M/UL (ref 4–5.4)
SODIUM SERPL-SCNC: 139 MMOL/L (ref 136–145)
WBC # BLD AUTO: 4.13 K/UL (ref 3.9–12.7)

## 2023-09-22 PROCEDURE — 85730 THROMBOPLASTIN TIME PARTIAL: CPT | Performed by: NEUROLOGICAL SURGERY

## 2023-09-22 PROCEDURE — 85610 PROTHROMBIN TIME: CPT | Performed by: NEUROLOGICAL SURGERY

## 2023-09-22 PROCEDURE — 80048 BASIC METABOLIC PNL TOTAL CA: CPT | Performed by: NEUROLOGICAL SURGERY

## 2023-09-22 PROCEDURE — 85025 COMPLETE CBC W/AUTO DIFF WBC: CPT | Performed by: NEUROLOGICAL SURGERY

## 2023-09-22 PROCEDURE — 36415 COLL VENOUS BLD VENIPUNCTURE: CPT | Performed by: NEUROLOGICAL SURGERY

## 2023-09-25 ENCOUNTER — ANESTHESIA EVENT (OUTPATIENT)
Dept: SURGERY | Facility: HOSPITAL | Age: 58
DRG: 027 | End: 2023-09-25
Payer: MEDICARE

## 2023-09-25 ENCOUNTER — DOCUMENTATION ONLY (OUTPATIENT)
Dept: NEUROLOGY | Facility: CLINIC | Age: 58
End: 2023-09-25
Payer: MEDICARE

## 2023-09-26 ENCOUNTER — HOSPITAL ENCOUNTER (INPATIENT)
Facility: HOSPITAL | Age: 58
LOS: 1 days | Discharge: HOME | DRG: 027 | End: 2023-09-27
Attending: NEUROLOGICAL SURGERY | Admitting: NEUROLOGICAL SURGERY
Payer: MEDICARE

## 2023-09-26 ENCOUNTER — ANESTHESIA (OUTPATIENT)
Dept: SURGERY | Facility: HOSPITAL | Age: 58
DRG: 027 | End: 2023-09-26
Payer: MEDICARE

## 2023-09-26 DIAGNOSIS — G20.A1 PD (PARKINSON'S DISEASE): Primary | ICD-10-CM

## 2023-09-26 LAB
ABO + RH BLD: NORMAL
APTT PPP: 22.8 SEC (ref 21–32)
BASOPHILS # BLD AUTO: 0.07 K/UL (ref 0–0.2)
BASOPHILS NFR BLD: 1.3 % (ref 0–1.9)
BLD GP AB SCN CELLS X3 SERPL QL: NORMAL
DIFFERENTIAL METHOD: ABNORMAL
EOSINOPHIL # BLD AUTO: 0.2 K/UL (ref 0–0.5)
EOSINOPHIL NFR BLD: 3.5 % (ref 0–8)
ERYTHROCYTE [DISTWIDTH] IN BLOOD BY AUTOMATED COUNT: 11.9 % (ref 11.5–14.5)
HCT VFR BLD AUTO: 42.4 % (ref 37–48.5)
HGB BLD-MCNC: 14 G/DL (ref 12–16)
IMM GRANULOCYTES # BLD AUTO: 0.02 K/UL (ref 0–0.04)
IMM GRANULOCYTES NFR BLD AUTO: 0.4 % (ref 0–0.5)
INR PPP: 0.9 (ref 0.8–1.2)
LYMPHOCYTES # BLD AUTO: 1.6 K/UL (ref 1–4.8)
LYMPHOCYTES NFR BLD: 28.6 % (ref 18–48)
MCH RBC QN AUTO: 32.6 PG (ref 27–31)
MCHC RBC AUTO-ENTMCNC: 33 G/DL (ref 32–36)
MCV RBC AUTO: 99 FL (ref 82–98)
MONOCYTES # BLD AUTO: 0.6 K/UL (ref 0.3–1)
MONOCYTES NFR BLD: 11.4 % (ref 4–15)
NEUTROPHILS # BLD AUTO: 3 K/UL (ref 1.8–7.7)
NEUTROPHILS NFR BLD: 54.8 % (ref 38–73)
NRBC BLD-RTO: 0 /100 WBC
PLATELET # BLD AUTO: 221 K/UL (ref 150–450)
PMV BLD AUTO: 11.5 FL (ref 9.2–12.9)
PROTHROMBIN TIME: 9.9 SEC (ref 9–12.5)
RBC # BLD AUTO: 4.29 M/UL (ref 4–5.4)
SPECIMEN OUTDATE: NORMAL
WBC # BLD AUTO: 5.42 K/UL (ref 3.9–12.7)

## 2023-09-26 PROCEDURE — 71000033 HC RECOVERY, INTIAL HOUR: Performed by: NEUROLOGICAL SURGERY

## 2023-09-26 PROCEDURE — 85610 PROTHROMBIN TIME: CPT | Performed by: STUDENT IN AN ORGANIZED HEALTH CARE EDUCATION/TRAINING PROGRAM

## 2023-09-26 PROCEDURE — 71000016 HC POSTOP RECOV ADDL HR: Performed by: NEUROLOGICAL SURGERY

## 2023-09-26 PROCEDURE — 11000001 HC ACUTE MED/SURG PRIVATE ROOM

## 2023-09-26 PROCEDURE — D9220A PRA ANESTHESIA: ICD-10-PCS | Mod: ANES,,, | Performed by: ANESTHESIOLOGY

## 2023-09-26 PROCEDURE — 86850 RBC ANTIBODY SCREEN: CPT | Performed by: STUDENT IN AN ORGANIZED HEALTH CARE EDUCATION/TRAINING PROGRAM

## 2023-09-26 PROCEDURE — 95961 PR ELECTRODE STIM,BRAIN,MD 1ST HR: ICD-10-PCS | Mod: 26,S$GLB,, | Performed by: PSYCHIATRY & NEUROLOGY

## 2023-09-26 PROCEDURE — 61867 PR IMPLANT NEUROELECTRODE W/ RECORDING: ICD-10-PCS | Mod: LT,,, | Performed by: NEUROLOGICAL SURGERY

## 2023-09-26 PROCEDURE — 27201423 OPTIME MED/SURG SUP & DEVICES STERILE SUPPLY: Performed by: NEUROLOGICAL SURGERY

## 2023-09-26 PROCEDURE — 25000003 PHARM REV CODE 250: Performed by: STUDENT IN AN ORGANIZED HEALTH CARE EDUCATION/TRAINING PROGRAM

## 2023-09-26 PROCEDURE — A4648 IMPLANTABLE TISSUE MARKER: HCPCS | Performed by: NEUROLOGICAL SURGERY

## 2023-09-26 PROCEDURE — 36000710: Performed by: NEUROLOGICAL SURGERY

## 2023-09-26 PROCEDURE — 94761 N-INVAS EAR/PLS OXIMETRY MLT: CPT

## 2023-09-26 PROCEDURE — 61867 IMPLANT NEUROELECTRODE: CPT | Mod: LT,,, | Performed by: NEUROLOGICAL SURGERY

## 2023-09-26 PROCEDURE — 63600175 PHARM REV CODE 636 W HCPCS: Performed by: STUDENT IN AN ORGANIZED HEALTH CARE EDUCATION/TRAINING PROGRAM

## 2023-09-26 PROCEDURE — 37000008 HC ANESTHESIA 1ST 15 MINUTES: Performed by: NEUROLOGICAL SURGERY

## 2023-09-26 PROCEDURE — 95962 PR FUNC CORTICAL MAP,BRAIN,EA ADDN HR: ICD-10-PCS | Mod: 26,S$GLB,, | Performed by: PSYCHIATRY & NEUROLOGY

## 2023-09-26 PROCEDURE — 85025 COMPLETE CBC W/AUTO DIFF WBC: CPT | Performed by: STUDENT IN AN ORGANIZED HEALTH CARE EDUCATION/TRAINING PROGRAM

## 2023-09-26 PROCEDURE — 71000015 HC POSTOP RECOV 1ST HR: Performed by: NEUROLOGICAL SURGERY

## 2023-09-26 PROCEDURE — D9220A PRA ANESTHESIA: Mod: CRNA,,, | Performed by: NURSE ANESTHETIST, CERTIFIED REGISTERED

## 2023-09-26 PROCEDURE — 63600175 PHARM REV CODE 636 W HCPCS: Performed by: NURSE ANESTHETIST, CERTIFIED REGISTERED

## 2023-09-26 PROCEDURE — 36000711: Performed by: NEUROLOGICAL SURGERY

## 2023-09-26 PROCEDURE — 95961 ELECTRODE STIMULATION BRAIN: CPT | Mod: 26,S$GLB,, | Performed by: PSYCHIATRY & NEUROLOGY

## 2023-09-26 PROCEDURE — 37000009 HC ANESTHESIA EA ADD 15 MINS: Performed by: NEUROLOGICAL SURGERY

## 2023-09-26 PROCEDURE — 25000003 PHARM REV CODE 250: Performed by: NEUROLOGICAL SURGERY

## 2023-09-26 PROCEDURE — 25000003 PHARM REV CODE 250: Performed by: NURSE ANESTHETIST, CERTIFIED REGISTERED

## 2023-09-26 PROCEDURE — D9220A PRA ANESTHESIA: ICD-10-PCS | Mod: CRNA,,, | Performed by: NURSE ANESTHETIST, CERTIFIED REGISTERED

## 2023-09-26 PROCEDURE — 36415 COLL VENOUS BLD VENIPUNCTURE: CPT | Performed by: STUDENT IN AN ORGANIZED HEALTH CARE EDUCATION/TRAINING PROGRAM

## 2023-09-26 PROCEDURE — 95962 ELECTRODE STIM BRAIN ADD-ON: CPT | Mod: 26,S$GLB,, | Performed by: PSYCHIATRY & NEUROLOGY

## 2023-09-26 PROCEDURE — 85730 THROMBOPLASTIN TIME PARTIAL: CPT | Performed by: STUDENT IN AN ORGANIZED HEALTH CARE EDUCATION/TRAINING PROGRAM

## 2023-09-26 PROCEDURE — 63600175 PHARM REV CODE 636 W HCPCS: Performed by: NEUROLOGICAL SURGERY

## 2023-09-26 PROCEDURE — D9220A PRA ANESTHESIA: Mod: ANES,,, | Performed by: ANESTHESIOLOGY

## 2023-09-26 PROCEDURE — 27201037 HC PRESSURE MONITORING SET UP

## 2023-09-26 PROCEDURE — 63600175 PHARM REV CODE 636 W HCPCS: Performed by: ANESTHESIOLOGY

## 2023-09-26 PROCEDURE — C1778 LEAD, NEUROSTIMULATOR: HCPCS | Performed by: NEUROLOGICAL SURGERY

## 2023-09-26 DEVICE — IMPLANTABLE DEVICE: Type: IMPLANTABLE DEVICE | Site: CRANIAL | Status: FUNCTIONAL

## 2023-09-26 RX ORDER — HYDROMORPHONE HYDROCHLORIDE 1 MG/ML
0.2 INJECTION, SOLUTION INTRAMUSCULAR; INTRAVENOUS; SUBCUTANEOUS EVERY 5 MIN PRN
Status: DISCONTINUED | OUTPATIENT
Start: 2023-09-26 | End: 2023-09-26 | Stop reason: HOSPADM

## 2023-09-26 RX ORDER — DEXAMETHASONE SODIUM PHOSPHATE 4 MG/ML
INJECTION, SOLUTION INTRA-ARTICULAR; INTRALESIONAL; INTRAMUSCULAR; INTRAVENOUS; SOFT TISSUE
Status: DISCONTINUED | OUTPATIENT
Start: 2023-09-26 | End: 2023-09-26

## 2023-09-26 RX ORDER — FENTANYL CITRATE 50 UG/ML
INJECTION, SOLUTION INTRAMUSCULAR; INTRAVENOUS
Status: DISCONTINUED | OUTPATIENT
Start: 2023-09-26 | End: 2023-09-26

## 2023-09-26 RX ORDER — HYDROXYZINE PAMOATE 25 MG/1
25 CAPSULE ORAL EVERY 8 HOURS PRN
Status: DISCONTINUED | OUTPATIENT
Start: 2023-09-26 | End: 2023-09-27 | Stop reason: HOSPADM

## 2023-09-26 RX ORDER — SODIUM CHLORIDE 0.9 % (FLUSH) 0.9 %
10 SYRINGE (ML) INJECTION
Status: DISCONTINUED | OUTPATIENT
Start: 2023-09-26 | End: 2023-09-26 | Stop reason: HOSPADM

## 2023-09-26 RX ORDER — NICARDIPINE HYDROCHLORIDE 0.2 MG/ML
INJECTION INTRAVENOUS CONTINUOUS PRN
Status: DISCONTINUED | OUTPATIENT
Start: 2023-09-26 | End: 2023-09-26

## 2023-09-26 RX ORDER — METOPROLOL SUCCINATE 25 MG/1
25 TABLET, EXTENDED RELEASE ORAL DAILY
Status: DISCONTINUED | OUTPATIENT
Start: 2023-09-27 | End: 2023-09-27 | Stop reason: HOSPADM

## 2023-09-26 RX ORDER — ONDANSETRON 2 MG/ML
INJECTION INTRAMUSCULAR; INTRAVENOUS
Status: DISCONTINUED | OUTPATIENT
Start: 2023-09-26 | End: 2023-09-26

## 2023-09-26 RX ORDER — ONDANSETRON 2 MG/ML
4 INJECTION INTRAMUSCULAR; INTRAVENOUS DAILY PRN
Status: DISCONTINUED | OUTPATIENT
Start: 2023-09-26 | End: 2023-09-26 | Stop reason: HOSPADM

## 2023-09-26 RX ORDER — MUPIROCIN 20 MG/G
OINTMENT TOPICAL
Status: DISCONTINUED | OUTPATIENT
Start: 2023-09-26 | End: 2023-09-26 | Stop reason: HOSPADM

## 2023-09-26 RX ORDER — REMIFENTANIL HYDROCHLORIDE 1 MG/ML
INJECTION, POWDER, LYOPHILIZED, FOR SOLUTION INTRAVENOUS CONTINUOUS PRN
Status: DISCONTINUED | OUTPATIENT
Start: 2023-09-26 | End: 2023-09-26

## 2023-09-26 RX ORDER — METOPROLOL SUCCINATE 25 MG/1
50 TABLET, EXTENDED RELEASE ORAL DAILY
Status: DISCONTINUED | OUTPATIENT
Start: 2023-09-27 | End: 2023-09-26

## 2023-09-26 RX ORDER — ACETAMINOPHEN 10 MG/ML
INJECTION, SOLUTION INTRAVENOUS
Status: DISCONTINUED | OUTPATIENT
Start: 2023-09-26 | End: 2023-09-26

## 2023-09-26 RX ORDER — ESCITALOPRAM OXALATE 10 MG/1
10 TABLET ORAL DAILY
Status: DISCONTINUED | OUTPATIENT
Start: 2023-09-27 | End: 2023-09-27 | Stop reason: HOSPADM

## 2023-09-26 RX ORDER — MUPIROCIN 20 MG/G
1 OINTMENT TOPICAL 2 TIMES DAILY
Status: DISCONTINUED | OUTPATIENT
Start: 2023-09-26 | End: 2023-09-26 | Stop reason: HOSPADM

## 2023-09-26 RX ORDER — BUPIVACAINE HYDROCHLORIDE AND EPINEPHRINE 5; 5 MG/ML; UG/ML
INJECTION, SOLUTION EPIDURAL; INTRACAUDAL; PERINEURAL
Status: DISCONTINUED | OUTPATIENT
Start: 2023-09-26 | End: 2023-09-26 | Stop reason: HOSPADM

## 2023-09-26 RX ORDER — NICARDIPINE HYDROCHLORIDE 2.5 MG/ML
INJECTION INTRAVENOUS
Status: DISCONTINUED | OUTPATIENT
Start: 2023-09-26 | End: 2023-09-26

## 2023-09-26 RX ORDER — CEFTRIAXONE 1 G/1
INJECTION, POWDER, FOR SOLUTION INTRAMUSCULAR; INTRAVENOUS
Status: DISCONTINUED | OUTPATIENT
Start: 2023-09-26 | End: 2023-09-26 | Stop reason: HOSPADM

## 2023-09-26 RX ORDER — OXYCODONE HYDROCHLORIDE 5 MG/1
5 TABLET ORAL EVERY 6 HOURS PRN
Status: DISCONTINUED | OUTPATIENT
Start: 2023-09-26 | End: 2023-09-27 | Stop reason: HOSPADM

## 2023-09-26 RX ORDER — PROPOFOL 10 MG/ML
VIAL (ML) INTRAVENOUS
Status: DISCONTINUED | OUTPATIENT
Start: 2023-09-26 | End: 2023-09-26

## 2023-09-26 RX ORDER — SODIUM CHLORIDE 9 MG/ML
INJECTION, SOLUTION INTRAVENOUS CONTINUOUS
Status: DISCONTINUED | OUTPATIENT
Start: 2023-09-26 | End: 2023-09-27 | Stop reason: HOSPADM

## 2023-09-26 RX ORDER — RASAGILINE 1 MG/1
1 TABLET ORAL DAILY
Status: DISCONTINUED | OUTPATIENT
Start: 2023-09-27 | End: 2023-09-27 | Stop reason: HOSPADM

## 2023-09-26 RX ORDER — ACETAMINOPHEN 325 MG/1
650 TABLET ORAL EVERY 6 HOURS PRN
Status: DISCONTINUED | OUTPATIENT
Start: 2023-09-26 | End: 2023-09-27 | Stop reason: HOSPADM

## 2023-09-26 RX ORDER — BACITRACIN ZINC 500 UNIT/G
OINTMENT (GRAM) TOPICAL
Status: DISCONTINUED | OUTPATIENT
Start: 2023-09-26 | End: 2023-09-26 | Stop reason: HOSPADM

## 2023-09-26 RX ORDER — MORPHINE SULFATE 2 MG/ML
1 INJECTION, SOLUTION INTRAMUSCULAR; INTRAVENOUS EVERY 4 HOURS PRN
Status: DISCONTINUED | OUTPATIENT
Start: 2023-09-26 | End: 2023-09-27 | Stop reason: HOSPADM

## 2023-09-26 RX ORDER — DEXMEDETOMIDINE HYDROCHLORIDE 100 UG/ML
INJECTION, SOLUTION INTRAVENOUS
Status: DISCONTINUED | OUTPATIENT
Start: 2023-09-26 | End: 2023-09-26

## 2023-09-26 RX ORDER — LIDOCAINE HYDROCHLORIDE AND EPINEPHRINE 10; 10 MG/ML; UG/ML
INJECTION, SOLUTION INFILTRATION; PERINEURAL
Status: DISCONTINUED | OUTPATIENT
Start: 2023-09-26 | End: 2023-09-26 | Stop reason: HOSPADM

## 2023-09-26 RX ADMIN — CEFTRIAXONE 2 G: 2 INJECTION, POWDER, FOR SOLUTION INTRAMUSCULAR; INTRAVENOUS at 07:09

## 2023-09-26 RX ADMIN — PROPOFOL 50 MG: 10 INJECTION, EMULSION INTRAVENOUS at 07:09

## 2023-09-26 RX ADMIN — PROPOFOL 20 MG: 10 INJECTION, EMULSION INTRAVENOUS at 07:09

## 2023-09-26 RX ADMIN — HYDROMORPHONE HYDROCHLORIDE 0.2 MG: 1 INJECTION, SOLUTION INTRAMUSCULAR; INTRAVENOUS; SUBCUTANEOUS at 12:09

## 2023-09-26 RX ADMIN — DEXAMETHASONE SODIUM PHOSPHATE 4 MG: 4 INJECTION, SOLUTION INTRAMUSCULAR; INTRAVENOUS at 08:09

## 2023-09-26 RX ADMIN — ACETAMINOPHEN 1000 MG: 10 INJECTION, SOLUTION INTRAVENOUS at 10:09

## 2023-09-26 RX ADMIN — SODIUM CHLORIDE: 0.9 INJECTION, SOLUTION INTRAVENOUS at 07:09

## 2023-09-26 RX ADMIN — FENTANYL CITRATE 25 MCG: 50 INJECTION, SOLUTION INTRAMUSCULAR; INTRAVENOUS at 12:09

## 2023-09-26 RX ADMIN — MUPIROCIN: 20 OINTMENT TOPICAL at 06:09

## 2023-09-26 RX ADMIN — DEXMEDETOMIDINE HYDROCHLORIDE 4 MCG: 100 INJECTION, SOLUTION INTRAVENOUS at 12:09

## 2023-09-26 RX ADMIN — SODIUM CHLORIDE, SODIUM GLUCONATE, SODIUM ACETATE, POTASSIUM CHLORIDE, MAGNESIUM CHLORIDE, SODIUM PHOSPHATE, DIBASIC, AND POTASSIUM PHOSPHATE: .53; .5; .37; .037; .03; .012; .00082 INJECTION, SOLUTION INTRAVENOUS at 07:09

## 2023-09-26 RX ADMIN — PROPOFOL 20 MG: 10 INJECTION, EMULSION INTRAVENOUS at 11:09

## 2023-09-26 RX ADMIN — DEXMEDETOMIDINE HYDROCHLORIDE 8 MCG: 100 INJECTION, SOLUTION INTRAVENOUS at 11:09

## 2023-09-26 RX ADMIN — REMIFENTANIL HYDROCHLORIDE 0.05 MCG/KG/MIN: 1 INJECTION, POWDER, LYOPHILIZED, FOR SOLUTION INTRAVENOUS at 08:09

## 2023-09-26 RX ADMIN — NICARDIPINE HYDROCHLORIDE 0.2 MG: 25 INJECTION, SOLUTION INTRAVENOUS at 09:09

## 2023-09-26 RX ADMIN — NICARDIPINE HYDROCHLORIDE 0.2 MG: 25 INJECTION, SOLUTION INTRAVENOUS at 10:09

## 2023-09-26 RX ADMIN — PROPOFOL 30 MG: 10 INJECTION, EMULSION INTRAVENOUS at 11:09

## 2023-09-26 RX ADMIN — OXYCODONE HYDROCHLORIDE 5 MG: 5 TABLET ORAL at 09:09

## 2023-09-26 RX ADMIN — NICARDIPINE HYDROCHLORIDE 2.5 MG/HR: 0.2 INJECTION, SOLUTION INTRAVENOUS at 09:09

## 2023-09-26 RX ADMIN — HYDROMORPHONE HYDROCHLORIDE 0.2 MG: 1 INJECTION, SOLUTION INTRAMUSCULAR; INTRAVENOUS; SUBCUTANEOUS at 02:09

## 2023-09-26 RX ADMIN — CEFTRIAXONE 2 G: 1 INJECTION, POWDER, FOR SOLUTION INTRAMUSCULAR; INTRAVENOUS at 08:09

## 2023-09-26 RX ADMIN — PROPOFOL 10 MG: 10 INJECTION, EMULSION INTRAVENOUS at 11:09

## 2023-09-26 RX ADMIN — FENTANYL CITRATE 25 MCG: 50 INJECTION, SOLUTION INTRAMUSCULAR; INTRAVENOUS at 07:09

## 2023-09-26 RX ADMIN — ONDANSETRON 4 MG: 2 INJECTION INTRAMUSCULAR; INTRAVENOUS at 12:09

## 2023-09-26 RX ADMIN — DEXMEDETOMIDINE HYDROCHLORIDE 4 MCG: 100 INJECTION, SOLUTION INTRAVENOUS at 11:09

## 2023-09-26 NOTE — H&P (VIEW-ONLY)
Please see below note from Dr. Conti clinic.   Pt presents for elective DBS    Physical Exam:    Constitutional: No distress.     HEENT: atraumatic/normocephalic    Cardiovascular: Regular rhythm.     Pulm: aerating well, saturating well    Abdominal: Soft.     Psych/Behavior: He is alert.     E4V5M6  AOx3  PERRL  EOMI  Face Symmetric  Tongue midline  BUE 5/5  BLE 5/5  No drift    Plan:   Proceed to OR      Pavel [facility]  [id number]            Neurosurgery clinic    Tika Navarro was seen in neurosurgical consultation at the office this afternoon.  She is a 57-year-old lady who began to note twitching in her hands in 2004.  This gradually progressed and she was diagnosed with Parkinson's disease.  Symptoms were reasonably mild and she was not recommended to start medication until 2010 when rigidity, dystonia and tremor became more severe.  She was on Rytary for a long period of time but has developed severe dyskinesias and is now being treated with Azilect and Symmetrel.  She has been [facility] in [facility] 2006.  Deep brain stimulation has been considered several times but the patient did not feel she was ready for this.  Dyskinesias are now quite severe and she has decided to proceed with the procedure.  Past medical history is generally benign.  She takes Bystolic for hypertension.  She is  and her  is with her today.     On physical examination she is a well-developed, thin white lady who is alert and cooperative.  She is an almost constant dyskinetic movement of the extremities and torso perhaps a little more apparent on the right.  She does not show much tremor.  Extraocular movements are good and pupils equal.  Speech is generally clear and she provides a good history.  There is not a lot of rigidity.  I did not have her walk today.     Impression:  Parkinson's disease.       Recommendation:  I discussed deep brain stimulation with her.  Parkinson's disease was  initially treated with surgical procedures and then after 1960 with dopamine analogs.  Surgical procedures were again resumed then in 1997 deep brain stimulation was approved by the FDA for surgical management of Parkinson's disease symptoms.  The electrodes are placed with stereotaxy using MRI to find the desired location or target of the electrode and CT scan with temporary fiducial screws for the reference frame.  She is awake for the procedure.  Microelectrode recording and micro stimulation or done during the procedure to find the best location and avoid side effects.  The electrode is capped into place and she will return the following week for insertion of the pulse generator.  Surgery is planned for next month.

## 2023-09-26 NOTE — H&P
Please see below note from Dr. Conti clinic.   Pt presents for elective DBS    Physical Exam:    Constitutional: No distress.     HEENT: atraumatic/normocephalic    Cardiovascular: Regular rhythm.     Pulm: aerating well, saturating well    Abdominal: Soft.     Psych/Behavior: He is alert.     E4V5M6  AOx3  PERRL  EOMI  Face Symmetric  Tongue midline  BUE 5/5  BLE 5/5  No drift    Plan:   Proceed to OR      Pavel [facility]  [id number]            Neurosurgery clinic    Tika Navarro was seen in neurosurgical consultation at the office this afternoon.  She is a 57-year-old lady who began to note twitching in her hands in 2004.  This gradually progressed and she was diagnosed with Parkinson's disease.  Symptoms were reasonably mild and she was not recommended to start medication until 2010 when rigidity, dystonia and tremor became more severe.  She was on Rytary for a long period of time but has developed severe dyskinesias and is now being treated with Azilect and Symmetrel.  She has been [facility] since 2006.  Deep brain stimulation has been considered several times but the patient did not feel she was ready for this.  Dyskinesias are now quite severe and she has decided to proceed with the procedure.  Past medical history is generally benign.  She takes Bystolic for hypertension.  She is  and her  is with her today.     On physical examination she is a well-developed, thin white lady who is alert and cooperative.  She is an almost constant dyskinetic movement of the extremities and torso perhaps a little more apparent on the right.  She does not show much tremor.  Extraocular movements are good and pupils equal.  Speech is generally clear and she provides a good history.  There is not a lot of rigidity.  I did not have her walk today.     Impression:  Parkinson's disease.       Recommendation:  I discussed deep brain stimulation with her.  Parkinson's disease was  initially treated with surgical procedures and then after 1960 with dopamine analogs.  Surgical procedures were again resumed then in 1997 deep brain stimulation was approved by the FDA for surgical management of Parkinson's disease symptoms.  The electrodes are placed with stereotaxy using MRI to find the desired location or target of the electrode and CT scan with temporary fiducial screws for the reference frame.  She is awake for the procedure.  Microelectrode recording and micro stimulation or done during the procedure to find the best location and avoid side effects.  The electrode is capped into place and she will return the following week for insertion of the pulse generator.  Surgery is planned for next month.

## 2023-09-26 NOTE — TRANSFER OF CARE
"Anesthesia Transfer of Care Note    Patient: Tika Navarro    Procedure(s) Performed: Procedure(s) (LRB):  INSERTION, FIDUCIAL SCREW, SKULL (N/A)  INSERTION, DEEP BRAIN STIMULATOR (Left)    Patient location: PACU    Anesthesia Type: MAC    Transport from OR: Transported from OR on room air with adequate spontaneous ventilation    Post pain: adequate analgesia    Post assessment: no apparent anesthetic complications and tolerated procedure well    Post vital signs: stable    Level of consciousness: awake, alert and oriented    Nausea/Vomiting: no nausea/vomiting    Complications: none    Transfer of care protocol was followed      Last vitals:   Visit Vitals  /76 (BP Location: Right arm, Patient Position: Lying)   Pulse 84   Temp 36.2 °C (97.2 °F) (Temporal)   Resp 16   Ht 5' 6" (1.676 m)   Wt 58.1 kg (128 lb)   LMP 12/28/2011   SpO2 97%   Breastfeeding No   BMI 20.66 kg/m²     "

## 2023-09-26 NOTE — ANESTHESIA POSTPROCEDURE EVALUATION
Anesthesia Post Evaluation    Patient: Tika Navaror    Procedure(s) Performed: Procedure(s) (LRB):  INSERTION, FIDUCIAL SCREW, SKULL (N/A)  INSERTION, DEEP BRAIN STIMULATOR (Left)    Final Anesthesia Type: MAC      Patient location during evaluation: PACU  Patient participation: Yes- Able to Participate  Level of consciousness: awake and alert  Post-procedure vital signs: reviewed and stable  Pain management: adequate  Airway patency: patent    PONV status at discharge: No PONV  Anesthetic complications: no      Cardiovascular status: blood pressure returned to baseline  Respiratory status: unassisted  Hydration status: euvolemic  Follow-up not needed.          Vitals Value Taken Time   /65 09/26/23 1419   Temp 36.2 °C (97.2 °F) 09/26/23 1211   Pulse 69 09/26/23 1430   Resp 11 09/26/23 1430   SpO2 93 % 09/26/23 1430   Vitals shown include unvalidated device data.      Event Time   Out of Recovery 12:30:00         Pain/Son Score: Pain Rating Prior to Med Admin: 6 (9/26/2023  2:19 PM)  Son Score: 10 (9/26/2023 12:30 PM)

## 2023-09-26 NOTE — PROGRESS NOTES
Pavel Hernández MD notified that carbidopa-levodopa is scheduled for 1500. MD ordered to give dose now. Patient's carbidopa-levodopa medication obtained from patient's significant other, Kelechi. Medication brought to pharmacy & verified. See MAR.

## 2023-09-26 NOTE — PROCEDURES
DBS IntraOP Brain Mapping    HPI: 57 y.o. woman with YOPD coming for rigidity/tremor control with DBS    Target  GPI  Laterality L  Lead Model  [employer]  Lead Passes  2      Pass1  Excellent CAMRON with tremor correlate  No major tremor control  Some leg rigidity improveemnt    Pass2 - 2 medial - and FINAL  Excellent CAMRON  Major tremor control at deep contacts  Some leg rigidity improvement and arm rigidity improvement    Final Position  Tip at 1 mm below target    Patient was stimulated over 180 minutes.       Ariella Rao MD, MS  [facility]  Department of Neurology  Movement Disorders

## 2023-09-26 NOTE — NURSING TRANSFER
Nursing Transfer Note      9/26/2023   1:10 PM    Nurse giving handoff:CARL Crump  Nurse receiving handoff:***    Reason patient is being transferred: ***    Transfer {TRANSFER TO/FROM:60050}: ***    Transfer via stretcher    Transfer with {TRANSFER WITH:18777}    Transported by ***    Transfer Vital Signs:  Blood Pressure:***  Heart Rate:***  O2:***  Temperature:***  Respirations:***    Telemetry: {TELEMETRY:46136}  Order for Tele Monitor? {YES/NO:20267}    Additional Lines: {Additional Lines:39854}    4eyes on Skin: {YES/NO:20292}    Medicines sent: ***    Any special needs or follow-up needed: ***    Patient belongings transferred with patient: {YES/NO:20267}    Chart send with patient: Yes    Notified: son, Significant Other (Kelechi)    Patient reassessed at: *** (date, time)

## 2023-09-26 NOTE — ANESTHESIA PREPROCEDURE EVALUATION
09/26/2023  Pre-operative evaluation for Procedure(s) (LRB):  INSERTION, FIDUCIAL SCREW, SKULL (N/A)  INSERTION, DEEP BRAIN STIMULATOR (Right)    Tika Navarro is a 57 y.o. 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     Current Outpatient Medications on File Prior to Encounter   Medication Sig Dispense Refill    amantadine HCL (SYMMETREL) 100 mg capsule TAKE 1 CAPSULE BY MOUTH TWICE A  capsule 3    EScitalopram oxalate (LEXAPRO) 10 MG tablet Take 1 tablet (10 mg total) by mouth once daily. 90 tablet 3    nebivoloL (BYSTOLIC) 2.5 MG Tab Take 1 tablet (2.5 mg total) by mouth once daily. 90 tablet 3    rasagiline (AZILECT) 1 mg Tab TAKE 1 TABLET BY MOUTH EVERY DAY 90 tablet 3    RYTARY 36. mg CpSR TAKE BY MOUTH 1 CAPSULE 3 TIMES A  capsule 3    azelastine-fluticasone (DYMISTA) 137-50 mcg/spray Spry nassal spray 1 spray by Each Nostril route 2 (two) times daily. (Patient not taking: Reported on 7/20/2023) 23 g 0       Past Surgical History:   Procedure Laterality Date    BREAST SURGERY      FRACTURE SURGERY  1/1/2018    Broken hip    JOINT REPLACEMENT  1/1/2018    Hip    TONSILLECTOMY, ADENOIDECTOMY         Social History     Socioeconomic History    Marital status:    Tobacco Use    Smoking status: Never    Smokeless tobacco: Never  "  Substance and Sexual Activity    Alcohol use: Yes     Alcohol/week: 1.0 standard drink of alcohol     Types: 1 Glasses of wine per week    Drug use: Never    Sexual activity: Yes     Partners: Male     Birth control/protection: None   Social History Narrative    Lives with boyfriend     Social Determinants of Health     Financial Resource Strain: Low Risk  (8/11/2023)    Overall Financial Resource Strain (CARDIA)     Difficulty of Paying Living Expenses: Not hard at all   Food Insecurity: No Food Insecurity (8/11/2023)    Hunger Vital Sign     Worried About Running Out of Food in the Last Year: Never true     Ran Out of Food in the Last Year: Never true   Transportation Needs: No Transportation Needs (8/11/2023)    PRAPARE - Transportation     Lack of Transportation (Medical): No     Lack of Transportation (Non-Medical): No   Physical Activity: Inactive (8/11/2023)    Exercise Vital Sign     Days of Exercise per Week: 0 days     Minutes of Exercise per Session: 0 min   Stress: Stress Concern Present (8/11/2023)    [employer] - Occupational Stress Questionnaire     Feeling of Stress : Very much   Social Connections: Moderately Isolated (8/11/2023)    Social Connection and Isolation Panel [NHANES]     Frequency of Communication with Friends and Family: More than three times a week     Frequency of Social Gatherings with Friends and Family: More than three times a week     Attends Sabianism Services: Never     Active Member of Clubs or Organizations: No     Attends Club or Organization Meetings: Never     Marital Status: Living with partner   Housing Stability: Unknown (8/11/2023)    Housing Stability Vital Sign     Unable to Pay for Housing in the Last Year: No     Unstable Housing in the Last Year: No         CBC: No results for input(s): "WBC", "RBC", "HGB", "HCT", "PLT", "MCV", "MCH", "MCHC" in the last 72 hours.    CMP: No results for input(s): "NA", "K", "CL", " ""CO2", "BUN", "CREATININE", "GLU", "MG", "PHOS", "CALCIUM", "ALBUMIN", "PROT", "ALKPHOS", "ALT", "AST", "BILITOT" in the last 72 hours.    INR  No results for input(s): "PT", "INR", "PROTIME", "APTT" in the last 72 hours.        Diagnostic Studies:      EKG:    Normal sinus rhythm   Baseline Artifact   Normal ECG     When compared with ECG of 11-DEC-2015 15:21,   No significant change was found     Confirmed by Mario Vargas JR., MD ([id number]) on 9/21/2023 4:29:30 PM     2D Echo:  No results found for this or any previous visit.      Pre-op Assessment    I have reviewed the Patient Summary Reports.     I have reviewed the Nursing Notes. I have reviewed the NPO Status.      Review of Systems  Cardiovascular:   Hypertension    Neurological:   Parkinson's   Psych:   anxiety          Physical Exam  General: Well nourished, Alert and Cooperative    Airway:  Mallampati: II   Mouth Opening: Normal  Neck ROM: Normal ROM        Anesthesia Plan  Type of Anesthesia, risks & benefits discussed:    Anesthesia Type: Gen Natural Airway, MAC  Post Op Pain Control Plan: multimodal analgesia and IV/PO Opioids PRN  Induction:  IV  Informed Consent: Informed consent signed with the Patient and all parties understand the risks and agree with anesthesia plan.  All questions answered.   ASA Score: 3  Day of Surgery Review of History & Physical: H&P Update referred to the surgeon/provider.    Ready For Surgery From Anesthesia Perspective.     .      "

## 2023-09-26 NOTE — BRIEF OP NOTE
[facility] - Surgery ([facility])  Brief Operative Note    SUMMARY     Surgery Date: 9/26/2023     Surgeon(s) and Role:     * Chuck Conti MD - Primary     * Pavel Hernández MD - Resident - Assisting        Pre-op Diagnosis:  Parkinson's disease [G20]    Post-op Diagnosis:  Post-Op Diagnosis Codes:     * Parkinson's disease [G20]    Procedure(s) (LRB):  INSERTION, FIDUCIAL SCREW, SKULL (N/A)  INSERTION, DEEP BRAIN STIMULATOR (Left)    Anesthesia: Local MAC    Operative Findings: left Gpi DBS    Estimated Blood Loss: * No values recorded between 9/26/2023  7:45 AM and 9/26/2023 12:11 PM *    Estimated Blood Loss has been documented.         Specimens:   Specimen (24h ago, onward)      None            [id number]

## 2023-09-27 VITALS
HEIGHT: 66 IN | DIASTOLIC BLOOD PRESSURE: 73 MMHG | TEMPERATURE: 98 F | SYSTOLIC BLOOD PRESSURE: 131 MMHG | HEART RATE: 61 BPM | BODY MASS INDEX: 20.57 KG/M2 | RESPIRATION RATE: 16 BRPM | WEIGHT: 128 LBS | OXYGEN SATURATION: 94 %

## 2023-09-27 PROCEDURE — 25000003 PHARM REV CODE 250: Performed by: STUDENT IN AN ORGANIZED HEALTH CARE EDUCATION/TRAINING PROGRAM

## 2023-09-27 RX ORDER — METHOCARBAMOL 500 MG/1
500 TABLET, FILM COATED ORAL 4 TIMES DAILY PRN
Qty: 40 TABLET | Refills: 0 | Status: SHIPPED | OUTPATIENT
Start: 2023-09-27 | End: 2023-10-07

## 2023-09-27 RX ORDER — OXYCODONE HYDROCHLORIDE 5 MG/1
5 TABLET ORAL EVERY 6 HOURS PRN
Qty: 15 TABLET | Refills: 0 | Status: SHIPPED | OUTPATIENT
Start: 2023-09-27 | End: 2024-03-04

## 2023-09-27 RX ADMIN — OXYCODONE HYDROCHLORIDE 5 MG: 5 TABLET ORAL at 06:09

## 2023-09-27 RX ADMIN — ACETAMINOPHEN 650 MG: 325 TABLET ORAL at 01:09

## 2023-09-27 NOTE — OP NOTE
Date of Operation:  9/26/2023.    Preoperative Diagnosis:  Parkinson's Disease.    Postoperative Diagnosis:  Parkinson's Disease.    Procedure:  Placement of skull fiducial screws for intraoperative neuro navigation.  Implantation of deep brain stimulating electrode (Vercise Cartesia, [employer]) in left globus pallidus interna with microelectrode recording (Shirlene Reyes, Jalen and West).    Surgeon:  Chuck Conti MD    Assistant:  Pavel Hernández MD (Resident in Neurosurgery)    Anesthesia:  Local/MAC    Estimated Blood Loss:  Less than 50 ml.    Condition at End or Procedure:  Satisfactory.    Brief History:  This 57-year-old lady first noted onset of tremor in 2004 and was subsequently diagnosed with Parkinson's disease.  She was started on medication in 2010.  Over time she has experienced progressive rigidity dystonia, tremor and dyskinesia and is now admitted for deep brain stimulation.    Procedure in Detail:  Navigation MRI was performed.  The patient was brought to the operating room and in the supine position on her gurney given mild IV sedation.  A Graham catheter was inserted, sequential compression devices applied to the legs, and various intravenous lines started.  The head of the gurney was somewhat elevated and the scalp shaved, prepped with Betadine and draped in a sterile fashion.  Five fiducial marks were made with a crayon, 2 frontotemporal, 2 parietal, and 1 just to the right of midline and these were injected with 1% xylocaine and epinephrine.  At each location a stab wound was made with a 15 blade and the pericranium scraped from the skull.  A 10 mm Medtronic skull fiducial screw was inserted with the power screwdriver and tightened by hand.  All screws fit snugly.  The small incisions were closed with 3-0 nylon sutures and reinjected with 0.5% Marcaine and epinephrine.  The scalp was washed, bacitracin applied to the screws and the protective caps placed over them.  The head  was wrapped with roller gauze.  The patient was brought to CT scan where CT scan with the fiducial screws was obtained to merge with MRI for intraoperative neuro navigation.  The navigation program was worked out and the fiducial screws registered to the system.  The patient was then returned to the operating room.    She was transferred from her gurney to the operating table and placed in a partial beach chair position with the head somewhat elevated and allowed to rest on a Loyd horseshoe which had been padded.  The roller gauze was removed and the protective caps taken off of the screws.  The nonsterile reference arc was attached to the scalp and the fiducial screws registered to the system with good tolerance.  The navigation program was then used to find the proposed entry site in the posterior left frontal area and this was marked with a crayon.  The nonsterile reference frame was removed and the scalp prepped with Betadine.  The entry site was marked with an 18 gauge needle and bone awl and a small horseshoe incision outlined around the entry site.  The scalp was covered with the IO band shower curtain drape.  The incision site was injected with 1% xylocaine and epinephrine.  The incision was carried through the skin, galea and pericranium and the small skin flap elevated and retracted anteriorly with a 2-0 silk suture and rubber band.  A 14 mm chelsea hole was made at the entry site and widened internally with the M8 attachment and Kerrison rongeur.  The bur hole cover was affixed to the skull tightening the 2 screws.  The NexFrame ring was placed over this and these 3 screws tightened to the skull also.  The sterile reference arc was attached to the ring base and the fiducial screws again registered to the system with good fidelity.  The socket assembly was placed on to the ring base and the probe used to find the trajectory and depth to target and these were marked on the Alpha Omega drive head stage  making the proper adjustments.  The probe was exchanged for the multi lumen channel adapter and the head stage with its assorted cables affixed to the ring base.  The cannula was brought down through the center hole to the dura, the dura coagulated, opened with 11 blade and the cannula advanced to 25 mm above target.  The microelectrode collet was attached to the head stage and the microelectrode brought down to 15 mm above target.  With doctors Jalen De Jesus, and West doing microelectrode recording and micro stimulation the electrode was advanced to 1.5 mm below target.  Possible faint optic tract stimulation was detected.  Testing as the electrode was gradually withdrawn showed some improvement in rigidity, not much effect on tremor.  There were no clear side effects to suggest corticospinal tract activity.  After discussion it was decided to place a 2nd cannula 2 mm medial to the original track.  Recordings were more brisk with GPI coming in around 7 mm above target.  The electrode was again advanced a 2 mm below target with no distinct optic tract stimulation.  As this electrode was gradually withdrawn she did seem to have some improvement although not cessation of tremor and improvement in ankle rotation and flexibility in her right upper extremity.  It was felt this was the better location for the electrode.  The tip of the DBS electrode was placed at 1 mm below target.  Stimulating the DBS electrode she did have quite good suppression of tremor and relaxation of her extremities.  The electrode was then capped into place and the micro drive assembly removed.  The locking cap was placed over the electrode and lead kit attached to it.  The patient was given additional anesthesia, the scalp dissected and the DBS electrode buried behind her left ear.  The wounds were thoroughly irrigated.  The primary incision was closed with inverted interrupted 4-0 Vicryl sutures in the galea and skin staples.  The  fiducial screws removed and the small incisions closed with skin staples also.  Telfa and bacitracin dressings were placed over the incisions and the head wrapped with roller gauze.  She was allowed to awaken from sedation, transferred to her gurney and brought to the recovery area in satisfactory condition.  She received 2 g of Rocephin at the beginning of the procedure and Rocephin containing antibiotic irrigating solutions were used throughout.

## 2023-09-27 NOTE — PLAN OF CARE
Problem: Adult Inpatient Plan of Care  Goal: Plan of Care Review  Outcome: Ongoing, Progressing  Goal: Patient-Specific Goal (Individualized)  Outcome: Ongoing, Progressing  Goal: Absence of Hospital-Acquired Illness or Injury  Outcome: Ongoing, Progressing  Goal: Optimal Comfort and Wellbeing  Outcome: Ongoing, Progressing  Goal: Readiness for Transition of Care  Outcome: Ongoing, Progressing     Problem: Infection  Goal: Absence of Infection Signs and Symptoms  Outcome: Ongoing, Progressing     Problem: Fall Injury Risk  Goal: Absence of Fall and Fall-Related Injury  Outcome: Ongoing, Progressing     Problem: Pain Acute  Goal: Acceptable Pain Control and Functional Ability  Outcome: Ongoing, Progressing   POC reviewed and updated with the patient/family at the bedside. Questions regarding POC were encouraged and addressed. VSS, see flowsheets.   Patient is AOx 4 at this time. Fall and safety precautions maintained, no signs of injury noted during shift.  Pain management utilizing PRN pain medications effective, see MAR for administration details.  Patient able to repositioned herself for comfort with bed locked in low position, side rails up x 3, bed alarm on, with call light within reach. Instructed patient to call staff for mobility, verbalized understanding. No acute signs of distress noted at this time.  SCD's and NASIM hose on as per orders, fo;ey intact, IV's flushed and saline locked.

## 2023-09-27 NOTE — DISCHARGE SUMMARY
[facility] - Neurosurgery (Hospital)  Discharge Note  Short Stay    Procedure(s) (LRB):  INSERTION, FIDUCIAL SCREW, SKULL (N/A)  INSERTION, DEEP BRAIN STIMULATOR (Left)      OUTCOME: Patient tolerated treatment/procedure well without complication and is now ready for discharge.    DISPOSITION: Home or Self Care    FINAL DIAGNOSIS:  PD (Parkinson's disease)    FOLLOWUP: In clinic    DISCHARGE INSTRUCTIONS:    Discharge Procedure Orders   Notify your health care provider if you experience any of the following:  temperature >100.4     Notify your health care provider if you experience any of the following:  persistent nausea and vomiting or diarrhea     Notify your health care provider if you experience any of the following:  severe uncontrolled pain     Notify your health care provider if you experience any of the following:  redness, tenderness, or signs of infection (pain, swelling, redness, odor or green/yellow discharge around incision site)     Notify your health care provider if you experience any of the following:  difficulty breathing or increased cough     Notify your health care provider if you experience any of the following:  severe persistent headache     Notify your health care provider if you experience any of the following:  worsening rash     Notify your health care provider if you experience any of the following:  persistent dizziness, light-headedness, or visual disturbances     Notify your health care provider if you experience any of the following:  increased confusion or weakness     Activity as tolerated        TIME SPENT ON DISCHARGE: 10 minutes

## 2023-09-27 NOTE — PLAN OF CARE
[facility] - Neurosurgery (Hospital)  Discharge Final Note    Primary Care Provider: Barrera Darling MD    Expected Discharge Date: 9/27/2023    Patient to be discharged home.  The patient does not have any home needs.  Family to provide transportation home.  Neurosurgery clinic to schedule follow up appointment.    Future Appointments   Date Time Provider Department Center   10/11/2023  1:00 PM Chuck Conti MD [facility] [id number]        Final Discharge Note (most recent)       Final Note - 09/27/23 1255          Final Note    Assessment Type Final Discharge Note     Anticipated Discharge Disposition Home or Self Care        Post-Acute Status    Post-Acute Authorization Other     Other Status No Post-Acute Service Needs     Discharge Delays None known at this time                     Important Message from Medicare             Contact Info       Chuck Conti MD   Specialty: Neurosurgery    [address]  [facility] [address]   Phone: [phone]       Next Steps: Follow up in 1 week(s)

## 2023-09-27 NOTE — PLAN OF CARE
[facility] - Neurosurgery ([facility])  Discharge Assessment    Primary Care Provider: Barrera Darling MD       [id number] [place] - [address]  [address]  [place] [address]  Phone: [phone] Fax: [phone]    [facility] - [place], [place] - [address]  [address]  [address]  [place] [address]  Phone: [phone] Fax: [phone]    [facility] - [place] - [address]  [address]  [place] [address]  Phone: [phone] Fax: [phone]    [facility] [place] [address]  [address]  [place] [address]  Phone: [phone] Fax: [phone]    [id number] - [place] - [address]  [address]  [place] [address]  Phone: [phone] Fax: [phone]    [facility] - [place] - [facility] AT [facility] to [facility]  [place] [address]  Phone: [phone] Fax: [phone]       CM met with patient to obtain discharge planning assessment.    Discharge Assessment (most recent)       BRIEF DISCHARGE ASSESSMENT - 09/27/23 1253          Discharge Planning    Assessment Type Discharge Planning Brief Assessment     Resource/Environmental Concerns none     Support Systems Spouse/significant other     Equipment Currently Used at Home none     Current Living Arrangements home     Patient/Family Anticipates Transition to home with family     Patient/Family Anticipated Services at Transition none     DME Needed Upon Discharge  none     Discharge Plan A Home with family     Discharge Plan B Home with family

## 2023-09-27 NOTE — NURSING TRANSFER
Nursing Transfer Note      9/26/2023   7:56 PM    Nurse giving handoff:Chika PACU RN  Nurse receiving handoff:HUSSEIN CampbellU RN    Reason patient is being transferred: Med/surge care on NPU    Transfer To: 930 from PACU    Transfer via stretcher    Transfer with : Home medication - Carbidopa pills, phone , reserved hair, snacks    Transported by PCT    Transfer Vital Signs:  Blood Pressure:106/60  Heart Rate:79  O2:97  Temperature:97  Respirations:19    Additional Lines: Graham Catheter    Medicines sent: antibiotics infusing/home medication Carbidopa    Any special needs or follow-up needed: per orders    Patient belongings transferred with patient: Yes - cell phone,     Chart send with patient: Yes    Notified: family via epic text    Patient reassessed at: 9/26/23 19:30

## 2023-09-27 NOTE — NURSING
Patient has complaints of pain and a headache, PRN pain medication given, POC updated and reviewed with the patient and  at bedside. Questions regarding POC were encouraged and addressed. VSS, see flowsheets. Patient is AO x 4 at this time. Tele maintained per order. Pain/Nausea management using Fall/safety precautions maintained. Seizure precautions maintained. No signs of injury noted over night.  bed locked in low position, side rails up x 3, bed alarm not in use, and call light within reach. Instructed patient to call staff for mobility, verbalized understanding. No acute signs of distress noted at this time.

## 2023-09-28 ENCOUNTER — PATIENT OUTREACH (OUTPATIENT)
Dept: ADMINISTRATIVE | Facility: CLINIC | Age: 58
End: 2023-09-28
Payer: MEDICARE

## 2023-09-28 DIAGNOSIS — Z96.89 S/P DEEP BRAIN STIMULATOR PLACEMENT: ICD-10-CM

## 2023-09-28 DIAGNOSIS — G20.A1 PD (PARKINSON'S DISEASE): Primary | Chronic | ICD-10-CM

## 2023-09-28 NOTE — PROGRESS NOTES
C3 nurse spoke with Tika Navarro for a TCC post hospital discharge follow up call. The patient does not have a scheduled HOSFU appointment with Barrera Darling MD within 5-7 days post hospital discharge date 09/27/23. C3 nurse was unable to schedule HOSFU appointment in [facility].    [facility] referral placed.

## 2023-09-29 ENCOUNTER — PES CALL (OUTPATIENT)
Dept: HOME HEALTH SERVICES | Facility: CLINIC | Age: 58
End: 2023-09-29
Payer: MEDICARE

## 2023-10-02 ENCOUNTER — ANESTHESIA EVENT (OUTPATIENT)
Dept: SURGERY | Facility: HOSPITAL | Age: 58
End: 2023-10-02
Payer: MEDICARE

## 2023-10-02 NOTE — ANESTHESIA PREPROCEDURE EVALUATION
10/02/2023  Tika Navarro is a 57 y.o., female.    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    Generalized anxiety disorder    Borderline hyperlipidemia    Benign essential hypertension    History of right hip replacement    Chronic cough    Aortic atherosclerosis    Preop exam for internal medicine     Past Surgical History:   Procedure Laterality Date    BREAST SURGERY      DEEP BRAIN STIMULATOR PLACEMENT Left 9/26/2023    Procedure: INSERTION, DEEP BRAIN STIMULATOR;  Surgeon: Chuck Conti MD;  Location: [facility] OR [address] [place] [address];  Service: Neurosurgery;  Laterality: Left;  INSERTION OF ELECTRODE FOR DEEP BRAIN STIMULATION/ RIGHT GLOBUS PALLIDUS INTERNUS/ TEDS & SCD/ [employer].    FRACTURE SURGERY  1/1/2018    Broken hip    JOINT REPLACEMENT  1/1/2018    Hip    PLACEMENT OF FIDUCIAL SCREW INTO SPINE N/A 9/26/2023    Procedure: INSERTION, FIDUCIAL SCREW, SKULL;  Surgeon: Chuck Conti MD;  Location: [facility] OR [address] [place] [address];  Service: Neurosurgery;  Laterality: N/A;  APPLICATION OF FIDUCIALS FOR DEEP BRAIN STIMULATION/ TEDS & SCD/[employer]    TONSILLECTOMY, ADENOIDECTOMY           Pre-op Assessment    I have reviewed the Patient Summary Reports.     I have reviewed the Nursing Notes. I have reviewed the NPO Status.      Review of Systems      Physical Exam  General: Well nourished and Cooperative    Airway:  Mallampati: II   Mouth Opening: Normal  TM Distance: Normal  Tongue: Normal  Neck ROM: Normal ROM    Chest/Lungs:  Clear to auscultation    Heart:  Rate: Normal  Rhythm: Regular Rhythm        Anesthesia Plan  Type of Anesthesia, risks & benefits discussed:    Anesthesia Type: Gen ETT, Gen Natural Airway  Intra-op Monitoring Plan: Standard ASA  Monitors  Post Op Pain Control Plan: multimodal analgesia  Induction:  IV  Airway Plan: Direct  Informed Consent: Informed consent signed with the Patient and all parties understand the risks and agree with anesthesia plan.  All questions answered.   ASA Score: 2  Day of Surgery Review of History & Physical: H&P Update referred to the surgeon/provider.    Ready For Surgery From Anesthesia Perspective.     .

## 2023-10-03 ENCOUNTER — HOSPITAL ENCOUNTER (OUTPATIENT)
Facility: HOSPITAL | Age: 58
Discharge: HOME | End: 2023-10-03
Attending: NEUROLOGICAL SURGERY | Admitting: NEUROLOGICAL SURGERY
Payer: MEDICARE

## 2023-10-03 ENCOUNTER — ANESTHESIA (OUTPATIENT)
Dept: SURGERY | Facility: HOSPITAL | Age: 58
End: 2023-10-03
Payer: MEDICARE

## 2023-10-03 VITALS
HEART RATE: 69 BPM | HEIGHT: 66 IN | SYSTOLIC BLOOD PRESSURE: 136 MMHG | WEIGHT: 128 LBS | BODY MASS INDEX: 20.57 KG/M2 | TEMPERATURE: 98 F | RESPIRATION RATE: 20 BRPM | OXYGEN SATURATION: 100 % | DIASTOLIC BLOOD PRESSURE: 79 MMHG

## 2023-10-03 DIAGNOSIS — G20.B1 PARKINSON'S DISEASE WITH DYSKINESIA, UNSPECIFIED WHETHER MANIFESTATIONS FLUCTUATE: Primary | Chronic | ICD-10-CM

## 2023-10-03 DIAGNOSIS — G20.A1 PARKINSONS: ICD-10-CM

## 2023-10-03 PROCEDURE — 27201423 OPTIME MED/SURG SUP & DEVICES STERILE SUPPLY: Performed by: NEUROLOGICAL SURGERY

## 2023-10-03 PROCEDURE — 36000711: Performed by: NEUROLOGICAL SURGERY

## 2023-10-03 PROCEDURE — C1787 PATIENT PROGR, NEUROSTIM: HCPCS | Performed by: NEUROLOGICAL SURGERY

## 2023-10-03 PROCEDURE — 63600175 PHARM REV CODE 636 W HCPCS

## 2023-10-03 PROCEDURE — 61885 INSRT/REDO NEUROSTIM 1 ARRAY: CPT | Mod: 58,LT,, | Performed by: NEUROLOGICAL SURGERY

## 2023-10-03 PROCEDURE — 71000045 HC DOSC ROUTINE RECOVERY EA ADD'L HR: Performed by: NEUROLOGICAL SURGERY

## 2023-10-03 PROCEDURE — D9220A PRA ANESTHESIA: ICD-10-PCS | Mod: CRNA,,, | Performed by: NURSE ANESTHETIST, CERTIFIED REGISTERED

## 2023-10-03 PROCEDURE — 37000009 HC ANESTHESIA EA ADD 15 MINS: Performed by: NEUROLOGICAL SURGERY

## 2023-10-03 PROCEDURE — 36000710: Performed by: NEUROLOGICAL SURGERY

## 2023-10-03 PROCEDURE — C1883 ADAPT/EXT, PACING/NEURO LEAD: HCPCS | Performed by: NEUROLOGICAL SURGERY

## 2023-10-03 PROCEDURE — 61885 PR IMP STIM,CRANIAL,SUBQ,1 ARRAY: ICD-10-PCS | Mod: 58,LT,, | Performed by: NEUROLOGICAL SURGERY

## 2023-10-03 PROCEDURE — 63600175 PHARM REV CODE 636 W HCPCS: Performed by: NURSE ANESTHETIST, CERTIFIED REGISTERED

## 2023-10-03 PROCEDURE — 63600175 PHARM REV CODE 636 W HCPCS: Performed by: NEUROLOGICAL SURGERY

## 2023-10-03 PROCEDURE — D9220A PRA ANESTHESIA: Mod: CRNA,,, | Performed by: NURSE ANESTHETIST, CERTIFIED REGISTERED

## 2023-10-03 PROCEDURE — 71000016 HC POSTOP RECOV ADDL HR: Performed by: NEUROLOGICAL SURGERY

## 2023-10-03 PROCEDURE — 71000044 HC DOSC ROUTINE RECOVERY FIRST HOUR: Performed by: NEUROLOGICAL SURGERY

## 2023-10-03 PROCEDURE — 25000003 PHARM REV CODE 250

## 2023-10-03 PROCEDURE — 37000008 HC ANESTHESIA 1ST 15 MINUTES: Performed by: NEUROLOGICAL SURGERY

## 2023-10-03 PROCEDURE — C1820 GENERATOR NEURO RECHG BAT SY: HCPCS | Performed by: NEUROLOGICAL SURGERY

## 2023-10-03 PROCEDURE — 71000015 HC POSTOP RECOV 1ST HR: Performed by: NEUROLOGICAL SURGERY

## 2023-10-03 PROCEDURE — D9220A PRA ANESTHESIA: Mod: ANES,,, | Performed by: ANESTHESIOLOGY

## 2023-10-03 PROCEDURE — 25000003 PHARM REV CODE 250: Performed by: NEUROLOGICAL SURGERY

## 2023-10-03 PROCEDURE — 25000003 PHARM REV CODE 250: Performed by: NURSE ANESTHETIST, CERTIFIED REGISTERED

## 2023-10-03 PROCEDURE — D9220A PRA ANESTHESIA: ICD-10-PCS | Mod: ANES,,, | Performed by: ANESTHESIOLOGY

## 2023-10-03 DEVICE — IMPLANTABLE DEVICE: Type: IMPLANTABLE DEVICE | Site: CHEST  WALL | Status: FUNCTIONAL

## 2023-10-03 RX ORDER — SODIUM CHLORIDE 9 MG/ML
INJECTION, SOLUTION INTRAVENOUS CONTINUOUS
Status: DISCONTINUED | OUTPATIENT
Start: 2023-10-03 | End: 2023-10-03 | Stop reason: HOSPADM

## 2023-10-03 RX ORDER — SUCCINYLCHOLINE CHLORIDE 20 MG/ML
INJECTION INTRAMUSCULAR; INTRAVENOUS
Status: DISCONTINUED | OUTPATIENT
Start: 2023-10-03 | End: 2023-10-03

## 2023-10-03 RX ORDER — OXYCODONE HYDROCHLORIDE 5 MG/1
5 TABLET ORAL
Status: DISCONTINUED | OUTPATIENT
Start: 2023-10-03 | End: 2023-10-03 | Stop reason: HOSPADM

## 2023-10-03 RX ORDER — OXYCODONE HYDROCHLORIDE 5 MG/1
5 TABLET ORAL EVERY 6 HOURS PRN
Status: CANCELLED | OUTPATIENT
Start: 2023-10-03

## 2023-10-03 RX ORDER — DEXAMETHASONE SODIUM PHOSPHATE 4 MG/ML
INJECTION, SOLUTION INTRA-ARTICULAR; INTRALESIONAL; INTRAMUSCULAR; INTRAVENOUS; SOFT TISSUE
Status: DISCONTINUED | OUTPATIENT
Start: 2023-10-03 | End: 2023-10-03

## 2023-10-03 RX ORDER — ACETAMINOPHEN 325 MG/1
650 TABLET ORAL EVERY 6 HOURS
Status: DISCONTINUED | OUTPATIENT
Start: 2023-10-03 | End: 2023-10-03

## 2023-10-03 RX ORDER — LIDOCAINE HYDROCHLORIDE 10 MG/ML
1 INJECTION, SOLUTION EPIDURAL; INFILTRATION; INTRACAUDAL; PERINEURAL ONCE AS NEEDED
Status: DISCONTINUED | OUTPATIENT
Start: 2023-10-03 | End: 2023-10-03 | Stop reason: HOSPADM

## 2023-10-03 RX ORDER — BACITRACIN ZINC 500 UNIT/G
OINTMENT (GRAM) TOPICAL
Status: DISCONTINUED | OUTPATIENT
Start: 2023-10-03 | End: 2023-10-03 | Stop reason: HOSPADM

## 2023-10-03 RX ORDER — ACETAMINOPHEN 325 MG/1
650 TABLET ORAL EVERY 6 HOURS
Status: DISCONTINUED | OUTPATIENT
Start: 2023-10-03 | End: 2023-10-03 | Stop reason: HOSPADM

## 2023-10-03 RX ORDER — SODIUM CHLORIDE 0.9 % (FLUSH) 0.9 %
10 SYRINGE (ML) INJECTION
Status: DISCONTINUED | OUTPATIENT
Start: 2023-10-03 | End: 2023-10-03 | Stop reason: HOSPADM

## 2023-10-03 RX ORDER — ONDANSETRON 2 MG/ML
INJECTION INTRAMUSCULAR; INTRAVENOUS
Status: DISCONTINUED | OUTPATIENT
Start: 2023-10-03 | End: 2023-10-03

## 2023-10-03 RX ORDER — CEFTRIAXONE 1 G/1
INJECTION, POWDER, FOR SOLUTION INTRAMUSCULAR; INTRAVENOUS
Status: DISCONTINUED | OUTPATIENT
Start: 2023-10-03 | End: 2023-10-03 | Stop reason: HOSPADM

## 2023-10-03 RX ORDER — ROCURONIUM BROMIDE 10 MG/ML
INJECTION, SOLUTION INTRAVENOUS
Status: DISCONTINUED | OUTPATIENT
Start: 2023-10-03 | End: 2023-10-03

## 2023-10-03 RX ORDER — ONDANSETRON 2 MG/ML
4 INJECTION INTRAMUSCULAR; INTRAVENOUS DAILY PRN
Status: DISCONTINUED | OUTPATIENT
Start: 2023-10-03 | End: 2023-10-03 | Stop reason: HOSPADM

## 2023-10-03 RX ORDER — DEXMEDETOMIDINE HYDROCHLORIDE 100 UG/ML
INJECTION, SOLUTION INTRAVENOUS
Status: DISCONTINUED | OUTPATIENT
Start: 2023-10-03 | End: 2023-10-03

## 2023-10-03 RX ORDER — LIDOCAINE HYDROCHLORIDE 20 MG/ML
INJECTION INTRAVENOUS
Status: DISCONTINUED | OUTPATIENT
Start: 2023-10-03 | End: 2023-10-03

## 2023-10-03 RX ORDER — LIDOCAINE HYDROCHLORIDE AND EPINEPHRINE 10; 10 MG/ML; UG/ML
INJECTION, SOLUTION INFILTRATION; PERINEURAL
Status: DISCONTINUED | OUTPATIENT
Start: 2023-10-03 | End: 2023-10-03 | Stop reason: HOSPADM

## 2023-10-03 RX ORDER — FENTANYL CITRATE 50 UG/ML
INJECTION, SOLUTION INTRAMUSCULAR; INTRAVENOUS
Status: DISCONTINUED | OUTPATIENT
Start: 2023-10-03 | End: 2023-10-03

## 2023-10-03 RX ORDER — FAMOTIDINE 10 MG/ML
INJECTION INTRAVENOUS
Status: DISCONTINUED | OUTPATIENT
Start: 2023-10-03 | End: 2023-10-03

## 2023-10-03 RX ORDER — HYDROMORPHONE HYDROCHLORIDE 1 MG/ML
0.2 INJECTION, SOLUTION INTRAMUSCULAR; INTRAVENOUS; SUBCUTANEOUS EVERY 5 MIN PRN
Status: DISCONTINUED | OUTPATIENT
Start: 2023-10-03 | End: 2023-10-03 | Stop reason: HOSPADM

## 2023-10-03 RX ORDER — PROPOFOL 10 MG/ML
VIAL (ML) INTRAVENOUS
Status: DISCONTINUED | OUTPATIENT
Start: 2023-10-03 | End: 2023-10-03

## 2023-10-03 RX ADMIN — LIDOCAINE HYDROCHLORIDE 50 MG: 20 INJECTION INTRAVENOUS at 01:10

## 2023-10-03 RX ADMIN — CEFTRIAXONE SODIUM 2 G: 2 INJECTION, POWDER, FOR SOLUTION INTRAMUSCULAR; INTRAVENOUS at 05:10

## 2023-10-03 RX ADMIN — SODIUM CHLORIDE: 0.9 INJECTION, SOLUTION INTRAVENOUS at 01:10

## 2023-10-03 RX ADMIN — FENTANYL CITRATE 100 MCG: 50 INJECTION, SOLUTION INTRAMUSCULAR; INTRAVENOUS at 01:10

## 2023-10-03 RX ADMIN — ACETAMINOPHEN 650 MG: 325 TABLET ORAL at 03:10

## 2023-10-03 RX ADMIN — SUCCINYLCHOLINE CHLORIDE 120 MG: 20 INJECTION, SOLUTION INTRAMUSCULAR; INTRAVENOUS at 01:10

## 2023-10-03 RX ADMIN — CEFTRIAXONE 2 G: 1 INJECTION, POWDER, FOR SOLUTION INTRAMUSCULAR; INTRAVENOUS at 01:10

## 2023-10-03 RX ADMIN — DEXAMETHASONE SODIUM PHOSPHATE 8 MG: 4 INJECTION, SOLUTION INTRAMUSCULAR; INTRAVENOUS at 01:10

## 2023-10-03 RX ADMIN — FAMOTIDINE 20 MG: 10 INJECTION, SOLUTION INTRAVENOUS at 01:10

## 2023-10-03 RX ADMIN — SODIUM CHLORIDE, SODIUM GLUCONATE, SODIUM ACETATE, POTASSIUM CHLORIDE, MAGNESIUM CHLORIDE, SODIUM PHOSPHATE, DIBASIC, AND POTASSIUM PHOSPHATE: .53; .5; .37; .037; .03; .012; .00082 INJECTION, SOLUTION INTRAVENOUS at 02:10

## 2023-10-03 RX ADMIN — ROCURONIUM BROMIDE 10 MG: 10 INJECTION INTRAVENOUS at 01:10

## 2023-10-03 RX ADMIN — ONDANSETRON 4 MG: 2 INJECTION INTRAMUSCULAR; INTRAVENOUS at 02:10

## 2023-10-03 RX ADMIN — PROPOFOL 150 MG: 10 INJECTION, EMULSION INTRAVENOUS at 01:10

## 2023-10-03 RX ADMIN — ONDANSETRON 4 MG: 2 INJECTION INTRAMUSCULAR; INTRAVENOUS at 01:10

## 2023-10-03 RX ADMIN — DEXMEDETOMIDINE 8 MCG: 100 INJECTION, SOLUTION, CONCENTRATE INTRAVENOUS at 01:10

## 2023-10-03 NOTE — OP NOTE
Date of Operation:  10/03/2023.    Preoperative Diagnosis:  Parkinson's  disease.    Postoperative Diagnosis:  Parkinson's disease.    Procedure:  Implantation of pulse generator for deep brain stimulation (Vercise Genus R16)    Surgeon:  Chuck Conti MD    Assistant:  Gerry Echavarria MD (Resident in Neurosurgery)    Anesthesia:  General Endotracheal.    Estimated Blood Loss:   Less than 50 ml.    Condition at End of Procedure:  Satisfactory.    Brief History:  This 57 year old lady first began to demonstrate tremor and rigidity in 2006 and was subsequently diagnosed with Parkinson's disease.  She underwent implantation of a deep brain stimulating electrode (Vercise, [employer], [employer]) in the left globus pallidus last week and now returns for insertion of the pulse generator.    Procedure in Detail:  The patient was brought to the operating room and in the supine position under premedication induced with general anesthesia.  Various intravenous lines were started and sequential compression devices applied to the legs.  The head was turned to the right and allowed to rest on a doughnut and the bed placed in reverse Trendelenburg position to take pressure off of the jugular veins.  The hair behind her left ear was shaved and this portion of the scalp, neck and left upper chest prepped with Betadine and draped in a sterile fashion.  A small coronal incision above her left ear and a transverse incision 3 fingerbreadths below the clavicle were outlined and these were injected with 1% xylocaine and epinephrine.  Operation was begun at the scalp.  The incision was carried through the skin and galea with the plasma blade and bleeding controlled with the plasma blade.  The scalp was dissected in the subgaleal plane with a hemostat and the buried DBS electrode with this lead kit delivered out of the wound and covered with an antibiotic-soaked sponge.  Attention was then turned to the chest.  Again the incision  was carried through the skin and subcutaneous tissue with the plasma blade and a subcutaneous pocket created with the plasma blade and blunt dissection over the pectoralis muscle and up to the clavicle.  The delta clavicular and retromastoid fascia were opened with a hemostat.  The shunt passer was brought down from the scalp to the chest.  The plastic trocar was removed and 2 connecting leads brought from the scalp down to the chest.  The one destined to be a future right-sided implant had the plug placed into its connector and tightened with a torque wrench.  This was buried posteriorly under the scalp.  The left-sided DBS electrode had the lead kit removed and was inserted into the remaining connecting lead and the screw tightened with a torque wrench also the proximal ends of the connecting leads were inserted into a [employer] pulse generator with the left side anterior and the right side posterior and these screws tightened with the torque wrench also.  The generator was placed into the chest wall the .  There was normal impedance in all channels.  The generator was then affixed to the chest wall with 3-0 silk sutures.  The wounds were thoroughly irrigated.  The chest wound was closed in 2 subcutaneous layers a deeper simple and more superficial inverted interrupted 3-0 Vicryl sutures and the skin closed with a 4-0 Monocryl suture half-inch Steri-Strips and Mastisol and covered with Telfa and Tegaderm.  The scalp wound was closed with inverted interrupted 3-0 Vicryl sutures in the galea and closed with skin staples and covered with a Telfa bacitracin dressing held in place with Tegaderm also.  The patient's head was returned to the supine position, she was allowed to awaken from anesthesia, extubated, transferred to her [facility] and brought to the outpatient recovery area in satisfactory condition.  She received 2 g of Rocephin at the beginning of the procedure and Rocephin containing antibiotic  irrigating solutions were used throughout.

## 2023-10-03 NOTE — ANESTHESIA PROCEDURE NOTES
Intubation    Date/Time: 10/3/2023 1:25 PM    Performed by: Dalton Alves CRNA  Authorized by: Lucia Leung MD    Intubation:     Induction:  Intravenous    Intubated:  Postinduction    Mask Ventilation:  Easy with oral airway    Attempts:  1    Attempted By:  Student    Method of Intubation:  Direct    Blade:  Lea 2    Laryngeal View Grade: Grade I - full view of cords      Difficult Airway Encountered?: No      Complications:  None    Airway Device:  Oral endotracheal tube    Airway Device Size:  7.0    Style/Cuff Inflation:  Cuffed    Inflation Amount (mL):  6    Tube secured:  21    Secured at:  The lips    Placement Verified By:  Capnometry    Complicating Factors:  None    Findings Post-Intubation:  BS equal bilateral  Notes:      Head and neck maintained in neutral position throughout.

## 2023-10-03 NOTE — TRANSFER OF CARE
"Anesthesia Transfer of Care Note    Patient: Tika Navarro    Procedure(s) Performed: Procedure(s) (LRB):  INSERTION, PULSE GENERATOR, DEEP BRAIN STIMULATOR (Left)    Patient location: PACU    Anesthesia Type: general    Transport from OR: Transported from OR on 6-10 L/min O2 by face mask with adequate spontaneous ventilation    Post pain: adequate analgesia    Post assessment: no apparent anesthetic complications and tolerated procedure well    Post vital signs: stable    Level of consciousness: awake, alert and responds to stimulation    Nausea/Vomiting: no nausea/vomiting    Complications: none    Transfer of care protocol was followed      Last vitals:   Visit Vitals  /73 (BP Location: Right arm, Patient Position: Lying)   Pulse 78   Temp 36.9 °C (98.4 °F) (Temporal)   Resp 16   Ht 5' 6" (1.676 m)   Wt 58.1 kg (128 lb)   LMP 12/28/2011   SpO2 100%   Breastfeeding No   BMI 20.66 kg/m²     "

## 2023-10-03 NOTE — ANESTHESIA POSTPROCEDURE EVALUATION
Anesthesia Post Evaluation    Patient: Tika Navarro    Procedure(s) Performed: Procedure(s) (LRB):  INSERTION, PULSE GENERATOR, DEEP BRAIN STIMULATOR (Left)    Final Anesthesia Type: general      Patient location during evaluation: PACU  Patient participation: Yes- Able to Participate  Level of consciousness: awake and alert and oriented  Pain management: adequate  Airway patency: patent    PONV status at discharge: No PONV  Anesthetic complications: no      Cardiovascular status: blood pressure returned to baseline and hemodynamically stable  Respiratory status: unassisted  Hydration status: euvolemic  Follow-up not needed.          Vitals Value Taken Time   /73 10/03/23 1510   Temp 36.5 °C (97.7 °F) 10/03/23 1510   Pulse 74 10/03/23 1519   Resp 33 10/03/23 1519   SpO2 96 % 10/03/23 1519   Vitals shown include unvalidated device data.      No case tracking events are documented in the log.      Pain/Son Score: Son Score: 9 (10/3/2023  3:10 PM)

## 2023-10-03 NOTE — BRIEF OP NOTE
[facility] (2nd Fl)  Brief Operative Note    SUMMARY     Surgery Date: 10/3/2023     Surgeon(s) and Role:     * Chuck Conti MD - Primary    Assisting Surgeon: None    Pre-op Diagnosis:  Parkinson's disease [G20]    Post-op Diagnosis:  Post-Op Diagnosis Codes:     * Parkinson's disease [G20.A1]    Procedure(s) (LRB):  INSERTION, PULSE GENERATOR, DEEP BRAIN STIMULATOR (Left)    Anesthesia: General    Operative Findings: Normal skin and scalp.     Estimated Blood Loss: * No values recorded between 10/3/2023  1:49 PM and 10/3/2023  3:05 PM *    Estimated Blood Loss has not been documented. EBL = minimal.         Specimens:   Specimen (24h ago, onward)      None            [id number]

## 2023-10-06 NOTE — DISCHARGE SUMMARY
"[facility] (2nd Fl)  Discharge from OP    Patient Name: Tika Navarro  MRN: [id number]  Discharging Provider: Gerry Echavarria MD  Primary Care Provider: Barrera Darling MD    History of Present Illness: She is a 57-year-old lady who began to note twitching in her hands in 2004.  This gradually progressed and she was diagnosed with Parkinson's disease.  Symptoms were reasonably mild and she was not recommended to start medication until 2010 when rigidity, dystonia and tremor became more severe.  She was on Rytary for a long period of time but has developed severe dyskinesias and is now being treated with Azilect and Symmetrel.  [facility]  Deep brain stimulation has been considered several times but the patient did not feel she was ready for this.  Dyskinesias are now quite severe and she has decided to proceed with the procedure.  Past medical history is generally benign.  She takes Bystolic for hypertension.  She is  and her  is with her today.    Physical Exam:     Constitutional: No distress.      HEENT: atraumatic/normocephalic     Cardiovascular: Regular rhythm.      Pulm: aerating well, saturating well     Abdominal: Soft.      Psych/Behavior: He is alert.      E4V5M6  AOx3  PERRL  EOMI  Face Symmetric  Tongue midline  BUE 5/5  BLE 5/5  No drift  Discharge to: Home    Diagnoses:  1. Parkinson's disease with dyskinesia, unspecified whether manifestations fluctuate    2. Parkinsons        Discharge Medications:  Medications Prescribed on Discharge   Medications Ordered This Encounter   Medications    cefTRIAXone (ROCEPHIN) 2 g in dextrose 5 % in water (D5W) 100 mL IVPB (MB+)         Functional Condition: Ambulation with assistive devise(s) and/or support    Discharged Condition: good    Prognosis: Good    Follow-Up:     "--Patient stable for discharge to Home    --Please take prescriptions as detailed in medication " "list    --All questions/concerns addressed and answered    --Please followup with neurosurgery clinic in 4 weeks     --Please call immediately for any new onset nausea/vomiting/fever/chills, wound breakdown, numbness/tingling/weakness    Wound Care Instructions:  -If you have any dressings at discharge, please remove 48 hours after the surgery.  -If you have dissolvable sutures and glue over your incisions; do not pick at them; they will dissolve over a couple of weeks.  -If you have steri strips, do not remove, as they will fall off.  -If you have staples, do not remove, as they will be removed at clinic follow up.  -You may shower daily but do not soak or submerge wound in water.  -Scalp/head incisions, wash hair daily with baby shampoo and do not use hair products. Pat incision dry, do not rub.  -For head incisions, do not wear scarfs or hats.  -Keep all wounds clean, dry, and open to air.  -Do not apply creams or ointments to the wound.  -No driving while on narcotic pain medications  -Call Neurosurgery if the wound opens, drains, or becomes red"      [facility] - [facility] (2nd Fl)        "

## 2023-10-09 ENCOUNTER — OFFICE VISIT (OUTPATIENT)
Dept: HOME HEALTH SERVICES | Facility: CLINIC | Age: 58
End: 2023-10-09
Payer: MEDICARE

## 2023-10-09 VITALS
TEMPERATURE: 98 F | HEIGHT: 66 IN | RESPIRATION RATE: 18 BRPM | OXYGEN SATURATION: 98 % | BODY MASS INDEX: 20.57 KG/M2 | HEART RATE: 72 BPM | WEIGHT: 128 LBS | DIASTOLIC BLOOD PRESSURE: 73 MMHG | SYSTOLIC BLOOD PRESSURE: 119 MMHG

## 2023-10-09 DIAGNOSIS — Z96.89 S/P DEEP BRAIN STIMULATOR PLACEMENT: ICD-10-CM

## 2023-10-09 DIAGNOSIS — F06.70 MILD NEUROCOGNITIVE DISORDER DUE TO PARKINSON'S DISEASE: Primary | Chronic | ICD-10-CM

## 2023-10-09 DIAGNOSIS — G20.A1 MILD NEUROCOGNITIVE DISORDER DUE TO PARKINSON'S DISEASE: Primary | Chronic | ICD-10-CM

## 2023-10-09 PROCEDURE — 3074F PR MOST RECENT SYSTOLIC BLOOD PRESSURE < 130 MM HG: ICD-10-PCS | Mod: CPTII,S$GLB,, | Performed by: NURSE PRACTITIONER

## 2023-10-09 PROCEDURE — 99495 TRANSJ CARE MGMT MOD F2F 14D: CPT | Mod: S$GLB,,, | Performed by: NURSE PRACTITIONER

## 2023-10-09 PROCEDURE — 3078F PR MOST RECENT DIASTOLIC BLOOD PRESSURE < 80 MM HG: ICD-10-PCS | Mod: CPTII,S$GLB,, | Performed by: NURSE PRACTITIONER

## 2023-10-09 PROCEDURE — 3078F DIAST BP <80 MM HG: CPT | Mod: CPTII,S$GLB,, | Performed by: NURSE PRACTITIONER

## 2023-10-09 PROCEDURE — 1160F RVW MEDS BY RX/DR IN RCRD: CPT | Mod: CPTII,S$GLB,, | Performed by: NURSE PRACTITIONER

## 2023-10-09 PROCEDURE — 1111F DSCHRG MED/CURRENT MED MERGE: CPT | Mod: CPTII,S$GLB,, | Performed by: NURSE PRACTITIONER

## 2023-10-09 PROCEDURE — 99495 TCM SERVICES (MODERATE COMPLEXITY): ICD-10-PCS | Mod: S$GLB,,, | Performed by: NURSE PRACTITIONER

## 2023-10-09 PROCEDURE — 1159F MED LIST DOCD IN RCRD: CPT | Mod: CPTII,S$GLB,, | Performed by: NURSE PRACTITIONER

## 2023-10-09 PROCEDURE — 1111F PR DISCHARGE MEDS RECONCILED W/ CURRENT OUTPATIENT MED LIST: ICD-10-PCS | Mod: CPTII,S$GLB,, | Performed by: NURSE PRACTITIONER

## 2023-10-09 PROCEDURE — 1160F PR REVIEW ALL MEDS BY PRESCRIBER/CLIN PHARMACIST DOCUMENTED: ICD-10-PCS | Mod: CPTII,S$GLB,, | Performed by: NURSE PRACTITIONER

## 2023-10-09 PROCEDURE — 3074F SYST BP LT 130 MM HG: CPT | Mod: CPTII,S$GLB,, | Performed by: NURSE PRACTITIONER

## 2023-10-09 PROCEDURE — 1159F PR MEDICATION LIST DOCUMENTED IN MEDICAL RECORD: ICD-10-PCS | Mod: CPTII,S$GLB,, | Performed by: NURSE PRACTITIONER

## 2023-10-09 NOTE — PROGRESS NOTES
[facility]  Transitional Care Management (TCM) Home Visit    Encounter Provider: Omayra BHANDARI Chairs   PCP: Barrera Darling MD  Consult Requested By: Dr. Danny Pablo  Admit Date: 10/3/23   IP Discharge Date: 10/3/23  Hospital Length of Stay:RRHLOS@ days  Days since discharge (from IP or SNF): 6   [facility] On Call Contact Note: 10/03/2023  Hospital Diagnosis: PD (Parkinson's disease) [G20.A1];S/P deep brain stimulator placement [Z96.89]     HISTORY OF PRESENT ILLNESS      Patient ID: Tika Navarro is a 57 y.o. female was recently admitted to the hospital, this is their TCM encounter. She is found at home ambulatory with no assistive device, she is AAOx3 on exam, VSS.     Fall precautions reinforced. Education completed ~ 15 minutes. Plan to follow up.     VSS. Denies fever, chest pain, shortness of breath, nausea, vomiting, diarrhea. Risks of environmental exposure to coronavirus discussed including: social distancing, hand hygiene, and limiting departures from the home for necessities only.  Reports understanding and willingness to comply.  All hospital discharge orders reviewed and being followed, all medications reconciled and reviewed, patient and family verbalized understanding. No other needs identified at this time.     Discussion held with pt and/or family related to advanced care planning.  Discussed end of life goals and pt's wishes regarding end of care.  Encouraged designation of a HPOA and discussing wishes should the patient become sick and lose decision-making capacity.  Reviewed living will, [place] Post, and pt's wishes regarding life support and tube feeding.  Reviewed pt's current code status and prognosis.   Pt reports he would like everything done to keep her alive.   FULL CODE STATUS  20 minutes spent in discussion of these services.      Attestation: Screening criteria to assess the level of the patient's risk for infection with COVID-19 as recommended by the CDC at the time of the  above documented home visit concluded appropriateness to proceed. Universal precautions were maintained at all times, including provider use of 60% alcohol gel hand  immediately prior to entry and upon departing the patient's home.      Hospital Course Synopsis:    History of Present Illness: She is a 57-year-old lady who began to note twitching in her hands in 2004.  This gradually progressed and she was diagnosed with Parkinson's disease.  Symptoms were reasonably mild and she was not recommended to start medication until 2010 when rigidity, dystonia and tremor became more severe.  She was on Rytary for a long period of time but has developed severe dyskinesias and is now being treated with Azilect and Symmetrel.  [facility].  Deep brain stimulation has been considered several times but the patient did not feel she was ready for this.  Dyskinesias are now quite severe and she has decided to proceed with the procedure.  Past medical history is generally benign.  She takes Bystolic for hypertension.  She is  and her  is with her today.    DECISION MAKING TODAY     Assessment & Plan:  1. PD (Parkinson's disease)  Overview:  [facility] managing dr hayes cont current . Will be having deep brain stimulation procedure with dr miriam carver 9/26/23 .     Orders:  -     Ambulatory referral[facility]  Staples to head CDI, no drainage  Keep appt for staple removal in 2 days  No tremors on exam    2. S/P deep brain stimulator placement  -     Ambulatory referral/consult to [facility]  Staples to head CDI, no drainage  Keep appt for staple removal in 2 days  No tremors on exam       Medication List on Discharge:     Medication List            Accurate as of October 9, 2023  4:53 PM. If you have any questions, ask your nurse or doctor.                CONTINUE taking these medications      ALPRAZolam 0.25  MG tablet  Commonly known as: XANAX  Take 1 tablet (0.25 mg total) by mouth 2 (two) times daily as needed for Anxiety.     amantadine  mg capsule  Commonly known as: SYMMETREL  TAKE 1 CAPSULE BY MOUTH TWICE A DAY     azelastine-fluticasone 137-50 mcg/spray Spry nassal spray  Commonly known as: DYMISTA  1 spray by Each Nostril route 2 (two) times daily.     EScitalopram oxalate 10 MG tablet  Commonly known as: LEXAPRO  Take 1 tablet (10 mg total) by mouth once daily.     hydrOXYzine pamoate 25 MG Cap  Commonly known as: VISTARIL  Take 1 capsule (25 mg total) by mouth every 8 (eight) hours as needed (anxiety).     nebivoloL 2.5 MG Tab  Commonly known as: BYSTOLIC  Take 1 tablet (2.5 mg total) by mouth once daily.     oxyCODONE 5 MG immediate release tablet  Commonly known as: ROXICODONE  Take 1 tablet (5 mg total) by mouth every 6 (six) hours as needed for Pain.     rasagiline 1 mg Tab  Commonly known as: AZILECT  TAKE 1 TABLET BY MOUTH EVERY DAY     RYTARY 36. mg Cpsr  Generic drug: carbidopa-levodopa  TAKE BY MOUTH 1 CAPSULE 3 TIMES A DAY              Medication Reconciliation:  Were medications changed on discharge? No  Were medications in the home? Yes  Is the patient taking the medications as directed? Yes  Does the patient understand the medications and changes? Yes  Does updated med list accurately reflects meds patient is currently taking? Yes    ENVIRONMENT OF CARE      Family and/or Caregiver present at visit?  Yes  Name of Caregiver: Ben Navarro  History provided by: patient    Advance Care Planning   Advanced Care Planning Status:  Patient does not have an ACP conversation on file  Living Will: No  Power of : No  [place]: No    Does Caregiver have HCPoA: Yes  Changes today: No       Impression upon entering the home:  Physical Dwelling: single family home   Appearance of home environment: cleaniness: clean  Functional Status: independent  Mobility: ambulatory  Nutritional access:  adequate intake and access  Home Health: No, and does not need it at this time   DME/Supplies: none     Diagnostic tests reviewed/disposition: No diagnosic tests pending after this hospitalization.  Disease/illness education:  Parkinson's Disease  Establishment or re-establishment of referral orders for community resources: No other necessary community resources.   Discussion with other health care providers: No discussion with other health care providers necessary.   Does patient have a PCP at [facility]? Yes   Repatriation plan with PCP? follow-up with PCP within 30d   Does patient have an ostomy (ileostomy, colostomy, suprapubic catheter, nephrostomy tube, tracheostomy, PEG tube, pleurex catheter, cholecystostomy, etc)? No  Were BPAs reviewed? Yes    Social History     Socioeconomic History    Marital status:    Tobacco Use    Smoking status: Never    Smokeless tobacco: Never   Substance and Sexual Activity    Alcohol use: Yes     Alcohol/week: 1.0 standard drink of alcohol     Types: 1 Glasses of wine per week    Drug use: Never    Sexual activity: Yes     Partners: Male     Birth control/protection: None   Social History Narrative    Lives with boyfriend     Social Determinants of Health     Financial Resource Strain: Low Risk  (8/11/2023)    Overall Financial Resource Strain (CARDIA)     Difficulty of Paying Living Expenses: Not hard at all   Food Insecurity: No Food Insecurity (8/11/2023)    Hunger Vital Sign     Worried About Running Out of Food in the Last Year: Never true     Ran Out of Food in the Last Year: Never true   Transportation Needs: No Transportation Needs (8/11/2023)    PRAPARE - Transportation     Lack of Transportation (Medical): No     Lack of Transportation (Non-Medical): No   Physical Activity: Inactive (8/11/2023)    Exercise Vital Sign     Days of Exercise per Week: 0 days     Minutes of Exercise per Session: 0 min   Stress: Stress Concern Present (8/11/2023)    [employer] [employer]  Occupational Health - Occupational Stress Questionnaire     Feeling of Stress : Very much   Social Connections: Moderately Isolated (8/11/2023)    Social Connection and Isolation Panel [NHANES]     Frequency of Communication with Friends and Family: More than three times a week     Frequency of Social Gatherings with Friends and Family: More than three times a week     Attends Nondenominational Services: Never     Active Member of Clubs or Organizations: No     Attends Club or Organization Meetings: Never     Marital Status: Living with partner   Housing Stability: Unknown (8/11/2023)    Housing Stability Vital Sign     Unable to Pay for Housing in the Last Year: No     Unstable Housing in the Last Year: No         OBJECTIVE:     Vital Signs:  Vitals:    10/09/23 1044   BP: 119/73   Pulse: 72   Resp: 18   Temp: 97.9 °F (36.6 °C)       Review of Systems   Constitutional:  Negative for fatigue and unexpected weight change.   HENT:  Negative for congestion.    Eyes:  Negative for redness.   Respiratory:  Negative for cough and shortness of breath.    Cardiovascular:  Negative for chest pain and palpitations.   Gastrointestinal:  Negative for abdominal distention, nausea and vomiting.   Endocrine: Negative for polydipsia and polyuria.   Genitourinary:  Negative for difficulty urinating.   Musculoskeletal:  Negative for arthralgias and gait problem.   Skin:  Negative for color change and rash.   Neurological:  Negative for dizziness.   Psychiatric/Behavioral:  Negative for agitation.        Physical Exam:  Physical Exam  HENT:      Head: Atraumatic.   Eyes:      Pupils: Pupils are equal, round, and reactive to light.   Cardiovascular:      Rate and Rhythm: Normal rate.      Pulses: Normal pulses.   Pulmonary:      Effort: Pulmonary effort is normal.   Abdominal:      General: Bowel sounds are normal.   Musculoskeletal:         General: Normal range of motion.   Skin:     General: Skin is warm and dry.      Capillary Refill:  Capillary refill takes less than 2 seconds.   Neurological:      Mental Status: She is alert. Mental status is at baseline.   Psychiatric:         Mood and Affect: Mood normal.         INSTRUCTIONS FOR PATIENT:     Scheduled Follow-up, Appts Reviewed with Modifications if Needed: Yes  Future Appointments   Date Time Provider Department Center   10/11/2023  1:00 PM Chuck Conti MD [facility] NEUROS[facility]       Signature: Omayra BHANDARI Chairs, NP    Transition of Care Visit:  I have reviewed and updated the history and problem list.  I have reconciled the medication list.  I have discussed the hospitalization and current medical issues, prognosis and plans with the patient/family.

## 2023-10-11 ENCOUNTER — OFFICE VISIT (OUTPATIENT)
Dept: NEUROSURGERY | Facility: CLINIC | Age: 58
End: 2023-10-11
Payer: MEDICARE

## 2023-10-11 VITALS
HEIGHT: 66 IN | BODY MASS INDEX: 20.57 KG/M2 | HEART RATE: 80 BPM | DIASTOLIC BLOOD PRESSURE: 76 MMHG | WEIGHT: 128 LBS | SYSTOLIC BLOOD PRESSURE: 131 MMHG

## 2023-10-11 DIAGNOSIS — G20.B2 PARKINSON'S DISEASE WITH DYSKINESIA AND FLUCTUATING MANIFESTATIONS: Primary | ICD-10-CM

## 2023-10-11 PROCEDURE — 1159F PR MEDICATION LIST DOCUMENTED IN MEDICAL RECORD: ICD-10-PCS | Mod: CPTII,S$GLB,, | Performed by: NEUROLOGICAL SURGERY

## 2023-10-11 PROCEDURE — 99999 PR PBB SHADOW E&M-EST. PATIENT-LVL III: ICD-10-PCS | Mod: PBBFAC,,, | Performed by: NEUROLOGICAL SURGERY

## 2023-10-11 PROCEDURE — 1160F PR REVIEW ALL MEDS BY PRESCRIBER/CLIN PHARMACIST DOCUMENTED: ICD-10-PCS | Mod: CPTII,S$GLB,, | Performed by: NEUROLOGICAL SURGERY

## 2023-10-11 PROCEDURE — 99024 PR POST-OP FOLLOW-UP VISIT: ICD-10-PCS | Mod: S$GLB,,, | Performed by: NEUROLOGICAL SURGERY

## 2023-10-11 PROCEDURE — 3075F PR MOST RECENT SYSTOLIC BLOOD PRESS GE 130-139MM HG: ICD-10-PCS | Mod: CPTII,S$GLB,, | Performed by: NEUROLOGICAL SURGERY

## 2023-10-11 PROCEDURE — 3078F PR MOST RECENT DIASTOLIC BLOOD PRESSURE < 80 MM HG: ICD-10-PCS | Mod: CPTII,S$GLB,, | Performed by: NEUROLOGICAL SURGERY

## 2023-10-11 PROCEDURE — 1159F MED LIST DOCD IN RCRD: CPT | Mod: CPTII,S$GLB,, | Performed by: NEUROLOGICAL SURGERY

## 2023-10-11 PROCEDURE — 3075F SYST BP GE 130 - 139MM HG: CPT | Mod: CPTII,S$GLB,, | Performed by: NEUROLOGICAL SURGERY

## 2023-10-11 PROCEDURE — 99999 PR PBB SHADOW E&M-EST. PATIENT-LVL III: CPT | Mod: PBBFAC,,, | Performed by: NEUROLOGICAL SURGERY

## 2023-10-11 PROCEDURE — 3078F DIAST BP <80 MM HG: CPT | Mod: CPTII,S$GLB,, | Performed by: NEUROLOGICAL SURGERY

## 2023-10-11 PROCEDURE — 99024 POSTOP FOLLOW-UP VISIT: CPT | Mod: S$GLB,,, | Performed by: NEUROLOGICAL SURGERY

## 2023-10-11 PROCEDURE — 1160F RVW MEDS BY RX/DR IN RCRD: CPT | Mod: CPTII,S$GLB,, | Performed by: NEUROLOGICAL SURGERY

## 2023-10-11 NOTE — PROGRESS NOTES
Kusum Navarro returned in neurosurgical follow-up to the office today.  She was admitted to [facility] and underwent implantation of a deep brain stimulating electrode in the globus pallidus interna on 09/26/2023.  The Tradonoesia system was used. This seemed to go well and she returned on 10/03/2023 for insertion of the pulse generator.  She came back to the clinic on 10/05/2023 because she was having a little bleeding from the retroauricular scalp incision and a few additional staples were put in.  She returns today for staple removal.    On brief examination she is alert and in good spirits.  The wounds have gone onto good healing and the staples were removed.  There is still a little overriding of skin edge on the retroauricular incision but this seems to be going onto good healing.  There is some ecchymosis around the chest wall incision but this is healing well also.    She is due to begin programming on 10/16 2023.  I will follow [facility] through the [facility].

## 2023-10-12 NOTE — TELEPHONE ENCOUNTER
----- Message from Joyce Centeno MA sent at 10/12/2023  4:02 PM CDT -----  Regarding: FW: refill  Contact: [phone]    ----- Message -----  From: Leonarda Lyn  Sent: 10/12/2023  12:27 PM CDT  To: [facility] Staff  Subject: refill                                           Pt requesting refill of medication listed. Pt asking for Meka, she spoke to her yesterday. Pls call to discuss.   RYTARY 36. mg CpSR    ..  [facility]/pharmacy #[id number] - [place] - [address]  [address] [place] [address]  Phone: [phone] Fax: [phone]

## 2023-10-13 RX ORDER — LEVODOPA AND CARBIDOPA 145; 36.25 MG/1; MG/1
1 CAPSULE, EXTENDED RELEASE ORAL 3 TIMES DAILY
Qty: 270 CAPSULE | Refills: 3 | Status: SHIPPED | OUTPATIENT
Start: 2023-10-13

## 2023-10-23 ENCOUNTER — PATIENT MESSAGE (OUTPATIENT)
Dept: NEUROLOGY | Facility: CLINIC | Age: 58
End: 2023-10-23
Payer: MEDICARE

## 2023-10-24 ENCOUNTER — PATIENT MESSAGE (OUTPATIENT)
Dept: NEUROLOGY | Facility: CLINIC | Age: 58
End: 2023-10-24

## 2023-10-24 ENCOUNTER — OFFICE VISIT (OUTPATIENT)
Dept: NEUROLOGY | Facility: CLINIC | Age: 58
End: 2023-10-24
Payer: MEDICARE

## 2023-10-24 DIAGNOSIS — G20.B2 PARKINSON'S DISEASE WITH DYSKINESIA AND FLUCTUATING MANIFESTATIONS: Chronic | ICD-10-CM

## 2023-10-24 PROCEDURE — 99999 PR PBB SHADOW E&M-EST. PATIENT-LVL II: CPT | Mod: PBBFAC,,, | Performed by: PSYCHIATRY & NEUROLOGY

## 2023-10-24 PROCEDURE — 99999 PR PBB SHADOW E&M-EST. PATIENT-LVL II: ICD-10-PCS | Mod: PBBFAC,,, | Performed by: PSYCHIATRY & NEUROLOGY

## 2023-10-24 PROCEDURE — 99215 PR OFFICE/OUTPT VISIT, EST, LEVL V, 40-54 MIN: ICD-10-PCS | Mod: S$GLB,,, | Performed by: PSYCHIATRY & NEUROLOGY

## 2023-10-24 PROCEDURE — 1111F PR DISCHARGE MEDS RECONCILED W/ CURRENT OUTPATIENT MED LIST: ICD-10-PCS | Mod: CPTII,S$GLB,, | Performed by: PSYCHIATRY & NEUROLOGY

## 2023-10-24 PROCEDURE — 1111F DSCHRG MED/CURRENT MED MERGE: CPT | Mod: CPTII,S$GLB,, | Performed by: PSYCHIATRY & NEUROLOGY

## 2023-10-24 PROCEDURE — 99215 OFFICE O/P EST HI 40 MIN: CPT | Mod: S$GLB,,, | Performed by: PSYCHIATRY & NEUROLOGY

## 2023-10-24 NOTE — PROGRESS NOTES
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 woman with HTN, anxiety, R hip replacement, MVP, with iPD starting 2004 coming for DBS eval per Dr. Padgett. She notes in 2004 R hand 5th digit started to have a postural tremor. This progressed to the arm that years. Her gait was uninvolved until much more recently. Fell and broke elbow 2017. R hip pain began and grew until she was wheelchair due to pain until a repair 2018 (revised several times). After surgery she started feeling imbalanced. Does not fall but trips at times. Does not use an assist device. Can walk 2 blocks before out of breath.  (Pulmonology workup ongoing). Dyskinesias started 4 years ago. At times she can rock herself out of a chair (sliding down and out). Mild R foot dystonia started 4 years ago.  Some FOG when she turns x 4 years (when OFF)."    Sweats profusely when medications wear off    2 brothers and sister. No neuro issues.  Parents without neuro issues.  Has 2 boys- 40 and 38.    Med hx  Did not take medications till 2010  Started carbidopa/levodopa 25/100mg and amantadine    Current medications  -Rytary 145 1 tab TID [id number].  Lasting 4-5 hours before noticing effect wears off.    When it wears off her walking slows r>L.  -Dyskinesias continue, trying amantidine 100 mg Qdaily with about 70% relief.    Auditory hallucinations at times  Mild orthostasis    DBS expectations    She'd like to decrease medications to decrease dyskinesias  She'd like to be able to better balance  She wants her speech top be clearer  She'd like more energy  She'd like her hip pain  to resolve - but unclear if is from prior hip surgery    Had " "neuropsych testing 1 year prior    "Prior  Interval History:  - amantadine caused livedo, she's generally ok with   - more dyskinesia and dystonia of R arm and leg  - some falls, usually happens with a trip but more dramatic than if she did not have PD  - no big injuries, feels her vital proteins are helping  - gambling is still an issue, playing video poker machines - at the one place she goes, but has had wins and losses of $1000+ at a time  - eating healthy, sex drive is higher than her partner, says it is ok with their relationship  - NOW is finally interested in DBS, not intersted in any med changes at this time    From 9/30/21:  - 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, reduced down to only 1 tab a day and the reti remains  - rytary 95 TID - if higher, gets back dyskinesia  - getting PT TID, at [facility] on [address]  - lost about 6 lbs, weighs about 130  - really feeling only slightly off at times  - willing to try 145s  - enjoying halfway    2021:  - feeling more progression, more into the left  - more left hand tremor  - feels rytary wearing off, in the dip at 3-4 hours  - tingling in fingers and toes are periodically  - R hip is tighter than ever, post surgery 3 years ago - more pain now  - some worry about memory in the house, more micorwave issues "  -     From 2/19/20  -Rytary 145 1 tab TID [id number].  Lasting 4-5 hours before noticing effect wears off.    -Dyskinesias continue, trying amantidine 100 mg Qdaily with about 70% relief.  However, significantly worsens with stress.  -Continues on azilect, tolerating well  -Last fall, September 2019.  Using walker without further falls.    -Lots of stress at work with new boss.    -Memory loss - forgetting names of kids, best friends.  -Describes stabbing pain in R buttocks, no radiation.  Worse in AM.  Trying stretching, massage.  Feels similar pain to prior when she had R hip arthroplasty.      From 11/2019  - still having the Rytary dip  - more " "dystonia of the R side affecting gait  - not happy with her doing worse  - out of rytary 95s  - more freezing when off  - some ICD with shopping and eating and gambling    June 2019:  - last seen last year  - more off time, better stronger percieved at 3 hours ("Rytary dip")  - takes 1/2 tab cd/ld at 1 hr, then she gets dyskinetic  - no new tremor  - more dy skinesia  - cog ok  - mood stable  - no new bowel or bladder issues    From May 2018:   - 3 hip surgeries, last one was fully replaced R side on May 23rd.  Had some wacky dreams in the post-op ion January, better now.  Previous plate screws in R hip not working, then they added a amelie, and failed as well with poor healing.  Then replaced the whole thing.  Feels well overall now.     - now with a bit of pain but ok  - less dyskinesia now  -adds a little IR in the am (1/4 to 1/2) to boost her on      From Aug 2017:  - taking rytary 145 1 tab tid plus adds in cd/ld 25/100 1/4 tabs as needed  - doing well overall  - feels gait si a bit crooked  - still fairly brittle with dyskinesia  - declines dbs or duopa  - might be good candidate for neuroderm patch study  - dx with jose schuster of hand      From 6/2016 with Alisia:  Dyskinesias are not bad until she gets anxious. Mainly notes in the RUE/ RLE.  Tends to note dyskinesias in the evening around 7 to 7:30 PM, which annoys her as she is home and eating supper and wants to relax. She mentions that if could choose to have tremor or dyskinesias, she'd choose tremor.     She takes Rytary at 7:30- 12:30- 5:30. She uses 1/4 tab of cd/ld PRN tremor, stiffness, or slowness. On a bad day, she will use 1/4 tab up to TID. She has days where she does not require any extra doses.      Impulsive tendencies:  Eating, sex, gambling, and spending.    History of Present Illness (HPI):  46 yo patient with history of YOPD, previously seen by my colleague Dr. Burris, last visit one year ago.    Patient describes initial symptoms of R hand " tremor, progressing with mariela and CW over the past 5-7 years.  Had ICD with MPX - now not taking.  Poorly compliant with CD/LD 25/100 and has tendency to use as needed.    Wants more stability and regular follow-up.    Nonmotor/Premotor symptoms:  Hyposmia? Yes  RBD/sleep issues? Yes  Depression/anxiety? No  Constipation? No  Urinary issues? No  Sexual dysfunction? No  Orthostasis? No  Falls? No  Cognitive impairment? No  Psychoses? No  Pain? No    Per Dr. Burris's notes: 47 yo female with PD.  She takes Mirapex ER 0.75mg daily which has been titrated down due to her obsessive gambling problem. She was started on Sinemet 25/100 that she is taking 1 tablet twice daily. She is tolerating it well without major side effects, but does not think it is helpful at all for right hand tremor. She was previously on Artane with major improvement in symptoms but had to stop that due to forgetfulness and stuttering problems.  Currently she states that her symptoms are worse since March when she stopped Artane. She is having trouble with writing and balance.  Her sleep is fine and has not had any falls. Her gambling problem is improved according to her, but still compulsive 1/week.     ROS:  Review of Systems   Constitutional:  Negative for fever, malaise/fatigue and weight loss.   HENT:  Negative for congestion and hearing loss.    Eyes:  Negative for blurred vision and double vision.   Respiratory:  Negative for cough, shortness of breath and stridor.    Cardiovascular:  Negative for chest pain, palpitations and leg swelling.   Gastrointestinal:  Negative for constipation, nausea and vomiting.   Genitourinary:  Negative for dysuria, frequency and urgency.   Musculoskeletal:  Positive for falls. Negative for myalgias.   Skin:  Negative for rash.   Neurological:  Positive for tremors. Negative for speech change, focal weakness, seizures and headaches.   Endo/Heme/Allergies:  Does not bruise/bleed easily.   Psychiatric/Behavioral:   Negative for depression, hallucinations and memory loss. The patient is not nervous/anxious.      Past Medical History: The patient  has a past medical history of Anxiety, Hypertension, Mitral valve prolapse syndrome, Other chronic sinusitis, Parkinson disease (2004), and PONV (postoperative nausea and vomiting).    Social History: The patient  reports that she has never smoked. She has never used smokeless tobacco. She reports current alcohol use of about 1.0 standard drink of alcohol per week. She reports that she does not use drugs.    Family History: Their family history includes Coronary artery disease in her father; Diabetes in her father and mother; Hypertension in her mother; Neuropathy in her mother.    Allergies: No known drug allergies     Meds:   Current Outpatient Medications on File Prior to Visit   Medication Sig Dispense Refill    ALPRAZolam (XANAX) 0.25 MG tablet Take 1 tablet (0.25 mg total) by mouth 2 (two) times daily as needed for Anxiety. 20 tablet 0    amantadine HCL (SYMMETREL) 100 mg capsule TAKE 1 CAPSULE BY MOUTH TWICE A  capsule 3    azelastine-fluticasone (DYMISTA) 137-50 mcg/spray Spry nassal spray 1 spray by Each Nostril route 2 (two) times daily. 23 g 0    carbidopa-levodopa (RYTARY) 36. mg CpSR Take 1 capsule by mouth 3 (three) times daily. 270 capsule 3    EScitalopram oxalate (LEXAPRO) 10 MG tablet Take 1 tablet (10 mg total) by mouth once daily. 90 tablet 3    hydrOXYzine pamoate (VISTARIL) 25 MG Cap Take 1 capsule (25 mg total) by mouth every 8 (eight) hours as needed (anxiety). (Patient not taking: Reported on 9/28/2023) 30 capsule 1    nebivoloL (BYSTOLIC) 2.5 MG Tab Take 1 tablet (2.5 mg total) by mouth once daily. 90 tablet 3    oxyCODONE (ROXICODONE) 5 MG immediate release tablet Take 1 tablet (5 mg total) by mouth every 6 (six) hours as needed for Pain. (Patient not taking: Reported on 10/11/2023) 15 tablet 0    rasagiline (AZILECT) 1 mg Tab TAKE 1 TABLET BY  "MOUTH EVERY DAY 90 tablet 3     No current facility-administered medications on file prior to visit.     Exam:  [facility] 12/28/2011       MOTOR EXAMINATION (OFF)       Speech  0 - Normal.   Facial Expression  1 - Minimal Hypomimia, could be normal "Poker Face".   Tremor at Rest:      Face, lips, chin 0 - Absent.    Hands:      right 3 - Moderate in amplitude and present most of the time.   left 2 - Mild in amplitude and persistent. Or moderate in amplitude, but only intermittently present.    Feet:      right 0 - Absent.    left 0 - Absent.    Action or Postural Tremor of Hands      right 1 - Slight; present with action.   left 1 - Slight; present with action.                           Finger Taps      right 3 - Severely impaired. Frequent hesitation in initiating movements or arrests in ongoing movement.   left 2 - Moderately impaired. Definite and early fatiguing. May have occasional arrests in movement.   Hand Movements      right 2 - Moderately impaired. Definite and early fatiguing. May have occasional arrests in movement.   left 1 - Mild slowing and/or reduction in amplitude.   Rapid Alternating Movements of Hands      right 3 - Severely impaired. Frequent hesitation in initiating movements or arrests in ongoing movement.   left 2 - Moderately impaired. Definite and early fatiguing. May have occasional arrests in movement.   Leg Agility      right 3 - Severely impaired. Frequent hesitation in initiating movements or arrests in ongoing movement.   left 2 - Moderately impaired. Definite and early fatiguing. May have occasional arrests in movement.   Arising from Chair  1 - Slow; or may need more than one attempt.   Posture  1 - Not quite erect, slightly stooped posture; could be normal for older person.   Gait  1 - Walks slowly, may shuffle with short steps, but no festination (hastening steps) or propulsion.       Body Bradykinesia and Hypokinesia (Combining slowness, hesitancy, decreased armswing, small amplitude, " and poverty of movement in general)  1 - Minimal slowness, giving movement a deliberate character; could be normal for some persons. Possibly reduced amplitude.     DBS MRI Compatibility INFO  10/24/2023    IPG Model(s) BS Genus R16    Serial No. [id number]   Impedance NL       Pocket Adapter  None     _______________________________________    LEFT GPI 90/130  C&3 @2.0mA no imoprovement  C&2 @2.0mA better walking  C&1 @1.0mA better hand tapping  C&0 @0.7mA phosphenes      FINAL  Program 3 - blocked low to avoid phosphes    Program 2    Program 1          Final settings:      Laboratory/Radiological:  - Results:  - Independent review of images: MRI normal 2011    Problem List Items Addressed This Visit          Neuro    PD (Parkinson's disease) (Chronic)    Overview     neruop managing dr abigail interiano current . Will be having deep brain stimulation procedure with dr miriam carver 9/26/23 .          Current Assessment & Plan     YOPD with dyskinesias  S/P DBS  No post op complications  Made 3 DBS programs  1- ahnd  2- hand and foot  3- like 2 but blocks optic tract    May wean off amantadine as her dyskinesias are well controlled          I spent 60 minutes programing    Ariella Rao MD, MS  [facility]  Department of Neurology  Movement Disorders

## 2023-10-24 NOTE — PATIENT INSTRUCTIONS
Deep Brain Stimulation Medical Safety Issues - or things to know!     EKG / ECG  (cardiac rhythm checks) - If your doctor wants to do an EKG you should turn the DBS off so that it will not cause interference in your doctor interpreting the EKG tracing.      Surgery or procedures    - For surgical procedures you should turn the DBS off as well.  We can provide a letter to your doctor regarding surgical procedures and your DBS device if needed.    -For implanting new medical devices, please call Neurosurgery first.     - Electrocautery or Plasma Blade (used to stop bleeding in surgeries) - DBS needs to be turned off.  The surgeon needs to be aware regarding guidelines. Ideally, only bipolar electrocautery should be used. If needed, the grounding pad can be placed on the lower extremities.     - EEG (electroencephalogram) tracing.  Turn off DBS until monitoring is completed     - No lithotripsy (treatment for kidney stones). This may damage the neurostimulator circuitry. If lithotripsy must be used, do not focus the beam within 15 cm (6 in) of the neurostimulator     - Cardioversion - turn the device off. Some older devices need to be turned down to 0V.     - For Colonoscopy: Provider will follow electrocautery guidelines. The DBS may need to be turned off during the procedure to allow for monitoring of EKG.     - For Cataract surgery: Cataract surgery is considered safe and your DBS device can remain on.  If the ophthalmologist uses phaecoemulsification (ultrasound, most common), they should work from the top of the head down. The surgeon should not lay anything across the chest  If they use laser, the surgeon should use caution to not direct anything towards the DBS equipment.       Dentistry: - Dental x-rays and Panoramic dental x-rays are okay     - You may use electric toothbrushes  - Prophylactic Antibiotics: As of 2012, the American Dental Association (ADA) and the American Association of Orthopedic Surgeons  no longer recommend antibiotic prophylaxis for everyone with orthopedic implants. Based on these guidelines, we no longer recommend antibiotics for everyone with DBS. However, your dentist may rely more on your personal medical history to determine when antibiotics are appropriate.      Radiology - CT Scan and x-rays are okay        - Regular ultrasounds are okay. For example: ultrasounds of the heart, kidney etc.     - For mammograms, the radiology technician should be experienced with doing mammograms on cardiac pacemakers as the devices are similar. The pads should be positioned/ rotated so as not to pull/place pressure on the device or lead extenders (wires)     - DEXA scans okay      Radiation therapy Radiation therapy should not be directed at the neurostimulator.  High-radiation exposure may temporarily interfere with neurostimulator operation and may damage the neurostimulator.  Damage may not be immediately apparent.  To limit device exposure, use appropriate shielding or other measures, such as making beam angle adjustments to avoid the device.        MRIs - depends on company and type of battery.  Ask your DBS provider     - For all companies and batteries the imaging center has to be certified to do imaging in DBS patients. [facility] ([facility]) is certified.     - [employer]onic Soletra / Kinetra - brain-only conditional, impedance within range, 1.5T only, DBS set to 0 volts and off. Cannot do MRI impedance check with patient remote - has to be done by DBS provider or [employer] representative.     - Medtronic Activa SC, Activa PC, Activa RC - Full-body conditional, impedance within range, 1.5T only, DBS set to off if monopolar setting and either side, DBS set on if bipolar setting on both sides. Cannot do MRI impedance check with patient remote - has to be done by DBS provider or Med[employer]onic representative.     - Medtronic Percept PC - full-body conditional, impedance within range, 1.5T or  3T, DBS set to off if monopolar setting and either side, DBS set on if bipolar setting on both sides. Can do MRI check with patient remote or be done by DBS provider or [employer] representative.      - [employer] Vercise, Vercise PC  - not FDA-approved of MRI brain.  Brain only with special consent.  1.5T with DBS 0 mA and off. Cannot do MRI check with patient remote - has to be done by DBS provider.     - [employer] Vercise Gevia  - brain-only conditional, impedance within range (has to be done by DBS provider or [employer] representative), 1.5T only, DBS set to MRI mode by DBS provider or [employer] representative.       - [employer] Vercise Genus  - brain-only conditional, impedance within range (can be checked with patient remote or done by DBS provider or [employer] representative), 1.5T only, DBS set to MRI mode by patient remote       What to avoid    - No Diathermy (heated ultrasound or therapeutic ultrasound) Physical therapist or chiropractors use diathermy. Can cause neurostimulation system or tissue damage and can result in severe injury or death     -No Welding    -No High-Voltage electrical devices    - No Transcranial Magnetic Stimulation (TMS)    -DO NOT assist linemen incase of a power outage     - If you work on a worksite with electrical equipment, wear rubber-soled shoes to avoid electrocution risk and damage to your DBS equipment and or your brain    - Patients should not scuba dive below 10 meters (33 feet) of water or enter hyperbaric chambers above 2.0 atmospheres absolute (EDWIGE) as this could damage the neurostimulation system, before diving or using a hyperbaric chamber, patients should discuss the effects of high pressure with their clinician.       Precautions: -  Loss of coordination in activities such as swimming may occur. Have a swim wendy.      Airport security, concerts, courthouses, museums: -  Bring your DBS identification card to the  airport with you to present to security personnel prior to being screened. Tell the TSA agent you have a brain pacemaker in place.     -  Security devices will NOT harm your DBS; however, they may cause stimulation to switch ON or OFF and some patients may experience a momentary increase in perceived stimulation when passing through theft detectors and security screening devices.     -  Security personnel at most airports will conduct a pat-down security check and or use a wand instead of requiring the DBS patient to go through the security gate.     -  Always bring your patient  with you when you travel.      Mental Health: -  Depression, suicidal ideations and suicide have been reported in patients receiving DBS Therapy for Movement Disorders, although no direct cause and effect relationship has been established. This has been reported around 6 weeks post-surgery. Please contact your DBS provider immediately if you note any change in your mental health.      Physical Activities: -  After incisions have completely healed, it is ok to perform all physical activities, except for those that may result in repeated hard blows to the device, e.g., American football, ice hockey, and boxing. Wear helmets for the activities you would normally have a helmet for, e.g., bicycling, skiing, snowboarding, and horseback riding     -  If you have Parkinson's or essential tremor - both diagnoses can cause problems with balance.  Because of this symptom we recommended avoiding the use of ladders.     Reminder: This is an implanted device, meaning it is made of metal and plastic rather than your body's own cells. If you get an infection elsewhere in your body, and that infection gets into your bloodstream, it can travel to the device and infect it, requiring device removal. It is very important to treat infection quickly to prevent device infection.    Disclaimer: This is a partial list of safety concerns. Please review  the company [employer] website for a more complete list of safety concerns:

## 2023-10-24 NOTE — ASSESSMENT & PLAN NOTE
YOPD with dyskinesias  S/P DBS  No post op complications  Made 3 DBS programs  1- ahnd  2- hand and foot  3- like 2 but blocks optic tract    May wean off amantadine as her dyskinesias are well controlled

## 2023-10-27 ENCOUNTER — PATIENT MESSAGE (OUTPATIENT)
Dept: NEUROLOGY | Facility: CLINIC | Age: 58
End: 2023-10-27
Payer: MEDICARE

## 2023-11-21 ENCOUNTER — PATIENT MESSAGE (OUTPATIENT)
Dept: RESEARCH | Facility: HOSPITAL | Age: 58
End: 2023-11-21
Payer: MEDICARE

## 2023-12-01 ENCOUNTER — TELEPHONE (OUTPATIENT)
Dept: NEUROLOGY | Facility: CLINIC | Age: 58
End: 2023-12-01
Payer: MEDICARE

## 2023-12-01 NOTE — TELEPHONE ENCOUNTER
----- Message from Yeni Swanson sent at 12/1/2023 12:46 PM CST -----  Regarding: Appt  Contact: Pt  [phone]  Pt is calling to speak to you about a appt for Dr. Padgett please call

## 2024-01-10 ENCOUNTER — TELEPHONE (OUTPATIENT)
Dept: NEUROLOGY | Facility: CLINIC | Age: 59
End: 2024-01-10
Payer: MEDICARE

## 2024-02-06 ENCOUNTER — OFFICE VISIT (OUTPATIENT)
Dept: NEUROLOGY | Facility: CLINIC | Age: 59
End: 2024-02-06
Payer: MEDICARE

## 2024-02-06 ENCOUNTER — PATIENT MESSAGE (OUTPATIENT)
Dept: NEUROLOGY | Facility: CLINIC | Age: 59
End: 2024-02-06

## 2024-02-06 VITALS
HEART RATE: 78 BPM | DIASTOLIC BLOOD PRESSURE: 77 MMHG | WEIGHT: 143.44 LBS | SYSTOLIC BLOOD PRESSURE: 131 MMHG | BODY MASS INDEX: 23.15 KG/M2

## 2024-02-06 DIAGNOSIS — G20.B2 PARKINSON'S DISEASE WITH DYSKINESIA AND FLUCTUATING MANIFESTATIONS: Primary | Chronic | ICD-10-CM

## 2024-02-06 DIAGNOSIS — Z12.11 COLON CANCER SCREENING: ICD-10-CM

## 2024-02-06 PROCEDURE — 99215 OFFICE O/P EST HI 40 MIN: CPT | Mod: 25,S$GLB,, | Performed by: PSYCHIATRY & NEUROLOGY

## 2024-02-06 PROCEDURE — 95984 ALYS BRN NPGT PRGRMG ADDL 15: CPT | Mod: S$GLB,,, | Performed by: PSYCHIATRY & NEUROLOGY

## 2024-02-06 PROCEDURE — 95983 ALYS BRN NPGT PRGRMG 15 MIN: CPT | Mod: S$GLB,,, | Performed by: PSYCHIATRY & NEUROLOGY

## 2024-02-06 PROCEDURE — 99999 PR PBB SHADOW E&M-EST. PATIENT-LVL III: CPT | Mod: PBBFAC,,, | Performed by: PSYCHIATRY & NEUROLOGY

## 2024-02-06 NOTE — ASSESSMENT & PLAN NOTE
YOPD with dyskinesias  S/P DBS  No post op complications  Made 4 DBS programs  1- new  2- fallback  3- new2  4- new3 - like 2 but better foot tapping    We retrained on how to use the   Suggested take Rytary 145mg TID regularly

## 2024-02-06 NOTE — PROGRESS NOTES
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 woman with HTN, anxiety, R hip replacement, MVP, with iPD starting 2004 coming for DBS eval per Dr. Padgett. She notes in 2004 R hand 5th digit started to have a postural tremor. This progressed to the arm that years. Her gait was uninvolved until much more recently. Fell and broke elbow 2017. R hip pain began and grew until she was wheelchair due to pain until a repair 2018 (revised several times). After surgery she started feeling imbalanced. Does not fall but trips at times. Does not use an assist device. Can walk 2 blocks before out of breath.  (Pulmonology workup ongoing). Dyskinesias started 4 years ago. At times she can rock herself out of a chair (sliding down and out). Mild R foot dystonia started 4 years ago.  Some FOG when she turns x 4 years (when OFF)."    Sweats profusely when medications wear off    2 brothers and sister. No neuro issues.  Parents without neuro issues.  Has 2 boys- 40 and 38.    Med hx  Did not take medications till 2010  Started carbidopa/levodopa 25/100mg and amantadine    Current medications  -Rytary 145 1 tab TID [id number].  Lasting 4-5 hours before noticing effect wears off.    When it wears off her walking slows r>L.  -Dyskinesias continue, trying amantidine 100 mg Qdaily with about 70% relief.    Auditory hallucinations at times  Mild orthostasis    DBS expectations    She'd like to decrease medications to decrease dyskinesias  She'd like to be able to better balance  She wants her speech top be clearer  She'd like more energy  She'd like her hip pain  to resolve - but unclear if is " "from prior hip surgery    Had neuropsych testing 1 year prior    "Prior  Interval History:  - amantadine caused livedo, she's generally ok with   - more dyskinesia and dystonia of R arm and leg  - some falls, usually happens with a trip but more dramatic than if she did not have PD  - no big injuries, feels her vital proteins are helping  - gambling is still an issue, playing video poker machines - at the one place she goes, but has had wins and losses of $1000+ at a time  - eating healthy, sex drive is higher than her partner, says it is ok with their relationship  - NOW is finally interested in DBS, not intersted in any med changes at this time    From 9/30/21:  - 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, reduced down to only 1 tab a day and the reti remains  - rytary 95 TID - if higher, gets back dyskinesia  - [facility]  - lost about 6 lbs, weighs about 130  - really feeling only slightly off at times  - willing to try 145s  - enjoying senior care    2021:  - feeling more progression, more into the left  - more left hand tremor  - feels rytary wearing off, in the dip at 3-4 hours  - tingling in fingers and toes are periodically  - R hip is tighter than ever, post surgery 3 years ago - more pain now  - some worry about memory in the house, more micorwave issues "  -     From 2/19/20  -Rytary 145 1 tab TID [id number].  Lasting 4-5 hours before noticing effect wears off.    -Dyskinesias continue, trying amantidine 100 mg Qdaily with about 70% relief.  However, significantly worsens with stress.  -Continues on azilect, tolerating well  -Last fall, September 2019.  Using walker without further falls.    -Lots of stress at work with new boss.    -Memory loss - forgetting names of kids, best friends.  -Describes stabbing pain in R buttocks, no radiation.  Worse in AM.  Trying stretching, massage.  Feels similar pain to prior when she had R hip arthroplasty.      From 11/2019  - still " "having the Rytary dip  - more dystonia of the R side affecting gait  - not happy with her doing worse  - out of rytary 95s  - more freezing when off  - some ICD with shopping and eating and gambling    June 2019:  - last seen last year  - more off time, better stronger percieved at 3 hours ("Rytary dip")  - takes 1/2 tab cd/ld at 1 hr, then she gets dyskinetic  - no new tremor  - more dy skinesia  - cog ok  - mood stable  - no new bowel or bladder issues    From May 2018:   - 3 hip surgeries, last one was fully replaced R side on May 23rd.  Had some wacky dreams in the post-op ion January, better now.  Previous plate screws in R hip not working, then they added a amelie, and failed as well with poor healing.  Then replaced the whole thing.  Feels well overall now.     - now with a bit of pain but ok  - less dyskinesia now  -adds a little IR in the am (1/4 to 1/2) to boost her on      From Aug 2017:  - taking rytary 145 1 tab tid plus adds in cd/ld 25/100 1/4 tabs as needed  - doing well overall  - feels gait si a bit crooked  - still fairly brittle with dyskinesia  - declines dbs or duopa  - might be good candidate for neuroderm patch study  - dx with jose schuster of hand      From 6/2016 with Alisia:  Dyskinesias are not bad until she gets anxious. Mainly notes in the RUE/ RLE.  Tends to note dyskinesias in the evening around 7 to 7:30 PM, which annoys her as she is home and eating supper and wants to relax. She mentions that if could choose to have tremor or dyskinesias, she'd choose tremor.     She takes Rytary at 7:30- 12:30- 5:30. She uses 1/4 tab of cd/ld PRN tremor, stiffness, or slowness. On a bad day, she will use 1/4 tab up to TID. She has days where she does not require any extra doses.      Impulsive tendencies:  Eating, sex, gambling, and spending.    History of Present Illness (HPI):  48 yo patient with history of YOPD, previously seen by my colleague Dr. Burris, last visit one year ago.    Patient " describes initial symptoms of R hand tremor, progressing with mariela and CW over the past 5-7 years.  Had ICD with MPX - now not taking.  Poorly compliant with CD/LD 25/100 and has tendency to use as needed.    Wants more stability and regular follow-up.    Nonmotor/Premotor symptoms:  Hyposmia? Yes  RBD/sleep issues? Yes  Depression/anxiety? No  Constipation? No  Urinary issues? No  Sexual dysfunction? No  Orthostasis? No  Falls? No  Cognitive impairment? No  Psychoses? No  Pain? No    Per Dr. Burris's notes: 47 yo female with PD.  She takes Mirapex ER 0.75mg daily which has been titrated down due to her obsessive gambling problem. She was started on Sinemet 25/100 that she is taking 1 tablet twice daily. She is tolerating it well without major side effects, but does not think it is helpful at all for right hand tremor. She was previously on Artane with major improvement in symptoms but had to stop that due to forgetfulness and stuttering problems.  Currently she states that her symptoms are worse since March when she stopped Artane. She is having trouble with writing and balance.  Her sleep is fine and has not had any falls. Her gambling problem is improved according to her, but still compulsive 1/week.     ROS:  Review of Systems   Constitutional:  Negative for fever, malaise/fatigue and weight loss.   HENT:  Negative for congestion and hearing loss.    Eyes:  Negative for blurred vision and double vision.   Respiratory:  Negative for cough, shortness of breath and stridor.    Cardiovascular:  Negative for chest pain, palpitations and leg swelling.   Gastrointestinal:  Negative for constipation, nausea and vomiting.   Genitourinary:  Negative for dysuria, frequency and urgency.   Musculoskeletal:  Positive for falls. Negative for myalgias.   Skin:  Negative for rash.   Neurological:  Positive for tremors. Negative for speech change, focal weakness, seizures and headaches.   Endo/Heme/Allergies:  Does not bruise/bleed  easily.   Psychiatric/Behavioral:  Negative for depression, hallucinations and memory loss. The patient is not nervous/anxious.      Past Medical History: The patient  has a past medical history of Anxiety, Hypertension, Mitral valve prolapse syndrome, Other chronic sinusitis, Parkinson disease (2004), and PONV (postoperative nausea and vomiting).    Social History: The patient  reports that she has never smoked. She has never used smokeless tobacco. She reports current alcohol use of about 1.0 standard drink of alcohol per week. She reports that she does not use drugs.    Family History: Their family history includes Coronary artery disease in her father; Diabetes in her father and mother; Hypertension in her mother; Neuropathy in her mother.    Allergies: Adhesive tape-silicones     Meds:   Current Outpatient Medications on File Prior to Visit   Medication Sig Dispense Refill    ALPRAZolam (XANAX) 0.25 MG tablet Take 1 tablet (0.25 mg total) by mouth 2 (two) times daily as needed for Anxiety. 20 tablet 0    amantadine HCL (SYMMETREL) 100 mg capsule TAKE 1 CAPSULE BY MOUTH TWICE A  capsule 3    carbidopa-levodopa (RYTARY) 36. mg CpSR Take 1 capsule by mouth 3 (three) times daily. 270 capsule 3    EScitalopram oxalate (LEXAPRO) 10 MG tablet Take 1 tablet (10 mg total) by mouth once daily. 90 tablet 3    nebivoloL (BYSTOLIC) 2.5 MG Tab Take 1 tablet (2.5 mg total) by mouth once daily. 90 tablet 3    rasagiline (AZILECT) 1 mg Tab TAKE 1 TABLET BY MOUTH EVERY DAY 90 tablet 3    azelastine-fluticasone (DYMISTA) 137-50 mcg/spray Spry nassal spray 1 spray by Each Nostril route 2 (two) times daily. 23 g 0    hydrOXYzine pamoate (VISTARIL) 25 MG Cap Take 1 capsule (25 mg total) by mouth every 8 (eight) hours as needed (anxiety). (Patient not taking: Reported on 9/28/2023) 30 capsule 1    oxyCODONE (ROXICODONE) 5 MG immediate release tablet Take 1 tablet (5 mg total) by mouth every 6 (six) hours as needed for  "Pain. (Patient not taking: Reported on 10/11/2023) 15 tablet 0     No current facility-administered medications on file prior to visit.     Exam:  /77 (BP Location: Right arm, Patient Position: Sitting, BP Method: Medium (Automatic))   Pulse 78   Wt 65.1 kg (143 lb 6.6 oz)   LMP 12/28/2011   BMI 23.15 kg/m²       MOTOR EXAMINATION (OFF)       Speech  0 - Normal.   Facial Expression  1 - Minimal Hypomimia, could be normal "Poker Face".   Tremor at Rest:      Face, lips, chin 0 - Absent.    Hands:      right 3 - Moderate in amplitude and present most of the time.   left 2 - Mild in amplitude and persistent. Or moderate in amplitude, but only intermittently present.    Feet:      right 0 - Absent.    left 0 - Absent.    Action or Postural Tremor of Hands      right 1 - Slight; present with action.   left 1 - Slight; present with action.                           Finger Taps      right 3 - Severely impaired. Frequent hesitation in initiating movements or arrests in ongoing movement.   left 2 - Moderately impaired. Definite and early fatiguing. May have occasional arrests in movement.   Hand Movements      right 2 - Moderately impaired. Definite and early fatiguing. May have occasional arrests in movement.   left 1 - Mild slowing and/or reduction in amplitude.   Rapid Alternating Movements of Hands      right 3 - Severely impaired. Frequent hesitation in initiating movements or arrests in ongoing movement.   left 2 - Moderately impaired. Definite and early fatiguing. May have occasional arrests in movement.   Leg Agility      right 3 - Severely impaired. Frequent hesitation in initiating movements or arrests in ongoing movement.   left 2 - Moderately impaired. Definite and early fatiguing. May have occasional arrests in movement.   Arising from Chair  1 - Slow; or may need more than one attempt.   Posture  1 - Not quite erect, slightly stooped posture; could be normal for older person.   Gait  1 - Walks slowly, " may shuffle with short steps, but no festination (hastening steps) or propulsion.       Body Bradykinesia and Hypokinesia (Combining slowness, hesitancy, decreased armswing, small amplitude, and poverty of movement in general)  1 - Minimal slowness, giving movement a deliberate character; could be normal for some persons. Possibly reduced amplitude.     DBS MRI Compatibility INFO  02/06/2024    IPG Model(s) BS Genus R16    Serial No. [id number]   Impedance NL       Pocket Adapter  None     _______________________________________    LEFT GPI 90/130  C&3 @2.0mA no imoprovement  C&2 @2.0mA better walking  C&1 @1.0mA better hand tapping  C&0 @0.7mA phosphenes    Initial    Program 2    Program 1          FINAL  1- new  2- fallback  3- new2  4- new3 - like 2 but tricia foot tapping            FINAL  Program 3 - blocked low to avoid phosphes    Program 2    Program 1          Final settings:      Laboratory/Radiological:  - Results:  - Independent review of images: MRI normal 2011    Problem List Items Addressed This Visit          Neuro    PD (Parkinson's disease) - Primary (Chronic)    Overview     neruop managing dr abigail interiano current . Will be having deep brain stimulation procedure with dr miriam carver 9/26/23 .          Current Assessment & Plan     YOPD with dyskinesias  S/P DBS  No post op complications  Made 4 DBS programs  1- new  2- fallback  3- new2  4- new3 - like 2 but better foot tapping    We retrained on how to use the   Suggested take Rytary 145mg TID regularly        I spent 45 minutes programing    Ariella Rao MD, MS  [facility]  Department of Neurology  Movement Disorders

## 2024-02-07 ENCOUNTER — PATIENT MESSAGE (OUTPATIENT)
Dept: RESEARCH | Facility: HOSPITAL | Age: 59
End: 2024-02-07
Payer: MEDICARE

## 2024-02-21 NOTE — PROGRESS NOTES
"Name: Tika Navarro  MRN: [id number]   CSN: [id number]      Date: 11/13/2019      Chief Complaint:  - still having the Rytary dip  - more dystonia of the R side affecting gait  - not happy with her doing worse  - out of rytary 95s  - more freezing when off  - some ICD with shopping and eating and gambling    June 2019:  - last seen last year  - more off time, better stronger percieved at 3 hours ("Rytary dip")  - takes 1/2 tab cd/ld at 1 hr, then she gets dyskinetic  - no new tremor  - more dy skinesia  - cog ok  - mood stable  - no new bowel or bladder issues    From May 2018:   - 3 hip surgeries, last one was fully replaced R side on May 23rd.  Had some wacky dreams in the post-op ion January, better now.  Previous plate screws in R hip not working, then they added a amelie, and failed as well with poor healing.  Then replaced the whole thing.  Feels well overall now.     - now with a bit of pain but ok  - less dyskinesia now  -adds a little IR in the am (1/4 to 1/2) to boost her on      From Aug 2017:  - taking rytary 145 1 tab tid plus adds in cd/ld 25/100 1/4 tabs as needed  - doing well overall  - feels gait si a bit crooked  - still fairly brittle with dyskinesia  - declines dbs or duopa  - might be good candidate for neuroderm patch study  - dx with jose schuster of hand      From 6/2016 with Alisia:  Dyskinesias are not bad until she gets anxious. Mainly notes in the RUE/ RLE.  Tends to note dyskinesias in the evening around 7 to 7:30 PM, which annoys her as she is home and eating supper and wants to relax. She mentions that if could choose to have tremor or dyskinesias, she'd choose tremor.     She takes Rytary at 7:30- 12:30- 5:30. She uses 1/4 tab of cd/ld PRN tremor, stiffness, or slowness. On a bad day, she will use 1/4 tab up to TID. She has days where she does not require any extra doses.      Impulsive tendencies:  Eating, sex, gambling, and spending.    History of Present Illness (HPI):  48 yo " Caregiver involvement: Ritika (daughter) lives locally and comes over weekly to provide S with showers.  Pt is temporarily staying with a friend whos home is handicap accessible for pt while she recovers    Medications reviewed and all medications are available in the home this visit.    The following education was provided regarding medications, medication interactions, and look alike medications (specify): Continue as directed by MD.    Medications  are effective at this time.      Patient education provided this visit: see ADL note    Sharps education provided:  na    Patient level of understanding of education provided: see ADL note    Skilled Care Performed this visit: Completed OT evaluation and assessment for safety with ADL and mobility.    Modified Barthel ADL Index  Bowels              ( ) 0 = Incontinent or needs enemas              ( ) 1 = Occasional Accident              ( x) 2 = Continent  Bladder               ( ) 0 = Incontinent              ( ) 1 = Occasional accident (1x/wk)              (x ) 2 = Continent  Grooming               (x ) 0 = Needs help with personal care              ( ) 1 = Independent (including face, hair, teeth, shaving)  Toilet Use               ( ) 0 = Dependent              (x ) 1 = Needs some help              ( ) 2 = Independent  Feeding              ( ) 0 = Unable              ( ) 1 = Needs help, e.g. cutting              (x ) 2 = Independent  Transfers   (bed to chair and back)              ( ) 0 = Unable, no sitting balance              ( ) 1 = Major help (1 or 2 people), can sit              (x ) 2 = Minor help (verbal or physical)              ( ) 3 = Independent  Mobility              ( ) 0 = Immobile              ( ) 1 = Wheelchair independent (including corners)              (x ) 2 = Walks with the help of 1 person (physical or verbal help)              ( ) 3 = Independent (may use aid)  Dressing               ( ) 0 = Unable              (x ) 1 = Needs help; can do  patient with history of YOPD, previously seen by my colleague Dr. Burris, last visit one year ago.    Patient describes initial symptoms of R hand tremor, progressing with mariela and CW over the past 5-7 years.  Had ICD with MPX - now not taking.  Poorly compliant with CD/LD 25/100 and has tendency to use as needed.    Wants more stability and regular follow-up.    Nonmotor/Premotor symptoms:  Hyposmia? Yes  RBD/sleep issues? Yes  Depression/anxiety? No  Constipation? No  Urinary issues? No  Sexual dysfunction? No  Orthostasis? No  Falls? No  Cognitive impairment? No  Psychoses? No  Pain? No    Per Dr. Burris's notes: 45 yo female with PD.  She takes Mirapex ER 0.75mg daily which has been titrated down due to her obsessive gambling problem. She was started on Sinemet 25/100 that she is taking 1 tablet twice daily. She is tolerating it well without major side effects, but does not think it is helpful at all for right hand tremor. She was previously on Artane with major improvement in symptoms but had to stop that due to forgetfulness and stuttering problems.  Currently she states that her symptoms are worse since March when she stopped Artane. She is having trouble with writing and balance.  Her sleep is fine and has not had any falls. Her gambling problem is improved according to her, but still compulsive 1/week.     ROS:  Review of Systems   Constitutional: Negative for malaise/fatigue and weight loss.   HENT: Negative for hearing loss.    Eyes: Negative for blurred vision and double vision.   Respiratory: Negative for shortness of breath and stridor.    Cardiovascular: Negative for chest pain and palpitations.   Gastrointestinal: Negative for constipation, nausea and vomiting.   Genitourinary: Negative for frequency and urgency.   Musculoskeletal: Negative for falls and myalgias.   Skin: Negative for rash.   Neurological: Positive for tremors. Negative for focal weakness and seizures.   Endo/Heme/Allergies: Does not  bruise/bleed easily.   Psychiatric/Behavioral: Negative for depression, hallucinations and memory loss. The patient is not nervous/anxious.      Past Medical History: The patient  has a past medical history of Anxiety, Hypertension, Mitral valve prolapse syndrome, Other chronic sinusitis, Parkinson disease (2004), and PONV (postoperative nausea and vomiting).    Social History: The patient  reports that she has never smoked. She does not have any smokeless tobacco history on file. She reports that she drinks about 1.0 standard drinks of alcohol per week.    Family History: Their family history includes Coronary artery disease in her father; Diabetes in her father and mother; Hypertension in her mother; Neuropathy in her mother.    Allergies: No known drug allergies     Meds:   Current Outpatient Medications on File Prior to Visit   Medication Sig Dispense Refill    alprazolam (XANAX XR) 0.5 MG Tb24 Take by mouth once daily.      amantadine HCl (SYMMETREL) 100 mg capsule Take 1 capsule (100 mg total) by mouth 2 (two) times daily. 60 capsule 11    carbidopa-levodopa  mg (SINEMET)  mg per tablet Take 0.5 tablets by mouth 4 (four) times daily. 180 tablet 5    nebivolol (BYSTOLIC) 2.5 MG Tab Take 1 tablet (2.5 mg total) by mouth once daily. 5 tablet 0    paroxetine (PAXIL) 10 MG tablet Take 20 mg by mouth Twice daily.       paroxetine (PAXIL) 20 MG tablet       rasagiline (AZILECT) 1 mg Tab Take 1 tablet (1 mg total) by mouth once daily. 90 tablet 3    RYTARY 23.75-95 mg CpSR TAKE 1 CAPSULE 3 TIMES A    capsule 3    RYTARY 36. mg CpSR Take 1 capsule by mouth 3 (three) times daily. 270 capsule 3    RYTARY 36. mg CpSR TAKE 1 CAPSULE 3 TIMES A    capsule 1    amantadine HCl (SYMMETREL) 100 mg capsule Take 1 capsule (100 mg total) by mouth 2 (two) times daily. (Patient not taking: Reported on 11/13/2019) 60 capsule 11     No current facility-administered medications on file  "prior to visit.      Exam:  /70   Pulse 76   Ht 5' 6" (1.676 m)   Wt 61.2 kg (135 lb)   LMP 2011   BMI 21.79 kg/m²     OFF EXAM  * Specialized movement exam  Normal speech.    Mild facial masking.   R SEVERE bradykinesia while OFF   Mod on L.    Persistent resting tremor of R hand,   Some R foot dystonia, enhanced with gait - present in .   No other chorea, athetosis, myoclonus, or tics.   No motor impersistence.   Gait is normal-based and steady with good stride length      Laboratory/Radiological:  - Results:    - Independent review of images: MRI normal     Diagnoses:          1) Early onset PD, more progressive dyskinesia and motor fluctuations.  On Azilect, Rytary, CD/LD as needed and Amantadine 100 mg daily.  2) Urinary incontinence.  Some urge.        Medical Decision Makin) Rytary 145s now   2) Amantadine PRN  3) cd/ld as needed  4) exercise               Luis Enrique Padgett MD, MPH  Division of Movement and Memory Disorders  [facility]  [phone]        "

## 2024-03-04 ENCOUNTER — OFFICE VISIT (OUTPATIENT)
Dept: NEUROLOGY | Facility: CLINIC | Age: 59
End: 2024-03-04
Payer: MEDICARE

## 2024-03-04 VITALS
HEIGHT: 66 IN | DIASTOLIC BLOOD PRESSURE: 81 MMHG | HEART RATE: 93 BPM | WEIGHT: 143.06 LBS | BODY MASS INDEX: 22.99 KG/M2 | SYSTOLIC BLOOD PRESSURE: 129 MMHG

## 2024-03-04 DIAGNOSIS — G20.B2 PARKINSON'S DISEASE WITH DYSKINESIA AND FLUCTUATING MANIFESTATIONS: Primary | ICD-10-CM

## 2024-03-04 PROCEDURE — 99999 PR PBB SHADOW E&M-EST. PATIENT-LVL III: CPT | Mod: PBBFAC,,, | Performed by: PSYCHIATRY & NEUROLOGY

## 2024-03-04 PROCEDURE — 99214 OFFICE O/P EST MOD 30 MIN: CPT | Mod: S$GLB,,, | Performed by: PSYCHIATRY & NEUROLOGY

## 2024-03-04 NOTE — PROGRESS NOTES
Name: Tika Navarro  MRN: [id number]   CSN: [id number]      Date: 03/04/2024      Chief Complaint: pd     Interval History:  - last seen by me 1 year ago  - multiple visits from Dr. Rao since  - off DA, but still has an urge to michelle when at the bar, but doesn't have to go  - had DBS in October, feels GREAT!  - tremor is well-controlled on the R  - feels more confident, speech is great  - taking a little less meds at time but admits she wears  - no loss of cognition since surgery  - only complaint is the weigh tgain of 13 lbs    From Feb 2024:  - back after  8 months  - has been doing pretty well  - happy ewith meds  - not bothered too much by dyskinesia, but would consider DBS  - off time is also present, and some fluctuations that are bothersome  - feels cognition is good in general, but wants to be proactive      From June 2022  - amantadine caused livedo, she's generally ok with   - more dyskinesia and dystonia of R arm and leg  - some falls, usually happens with a trip but more dramatic than if she did not have PD  - no big injuries, feels her vital proteins are helping  - gambling is still an issue, playing video poker machines - at the one place she goes, but has had wins and losses of $1000+ at a time  - eating healthy, sex drive is higher than her partner, says it is ok with their relationship  - NOW is finally interested in DBS, not intersted in any med changes at this time    From 9/30/21:  - 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, reduced down to only 1 tab a day and the reti remains  - rytary 95 TID - if higher, gets back dyskinesia  - [facility]  - lost about 6 lbs, weighs about 130  - really feeling only slightly off at times  - willing to try 145s  - enjoying prison    2021:  - feeling more progression, more into the left  - more left hand tremor  - feels rytary wearing off, in the dip at 3-4 hours  - tingling in fingers and toes are periodically  - R hip is  "tighter than ever, post surgery 3 years ago - more pain now  - some worry about memory in the house, more micorwave issues   -     From 2/19/20  -Rytary 145 1 tab TID [id number].  Lasting 4-5 hours before noticing effect wears off.    -Dyskinesias continue, trying amantidine 100 mg Qdaily with about 70% relief.  However, significantly worsens with stress.  -Continues on azilect, tolerating well  -Last fall, September 2019.  Using walker without further falls.    -Lots of stress at work with new boss.    -Memory loss - forgetting names of kids, best friends.  -Describes stabbing pain in R buttocks, no radiation.  Worse in AM.  Trying stretching, massage.  Feels similar pain to prior when she had R hip arthroplasty.      From 11/2019  - still having the Rytary dip  - more dystonia of the R side affecting gait  - not happy with her doing worse  - out of rytary 95s  - more freezing when off  - some ICD with shopping and eating and gambling    June 2019:  - last seen last year  - more off time, better stronger percieved at 3 hours ("Rytary dip")  - takes 1/2 tab cd/ld at 1 hr, then she gets dyskinetic  - no new tremor  - more dy skinesia  - cog ok  - mood stable  - no new bowel or bladder issues    From May 2018:   - 3 hip surgeries, last one was fully replaced R side on May 23rd.  Had some wacky dreams in the post-op ion January, better now.  Previous plate screws in R hip not working, then they added a amelie, and failed as well with poor healing.  Then replaced the whole thing.  Feels well overall now.     - now with a bit of pain but ok  - less dyskinesia now  -adds a little IR in the am (1/4 to 1/2) to boost her on      From Aug 2017:  - taking rytary 145 1 tab tid plus adds in cd/ld 25/100 1/4 tabs as needed  - doing well overall  - feels gait si a bit crooked  - still fairly brittle with dyskinesia  - declines dbs or duopa  - might be good candidate for neuroderm patch study  - dx with jose schuster of " hand      From 6/2016 with Alisia:  Dyskinesias are not bad until she gets anxious. Mainly notes in the RUE/ RLE.  Tends to note dyskinesias in the evening around 7 to 7:30 PM, which annoys her as she is home and eating supper and wants to relax. She mentions that if could choose to have tremor or dyskinesias, she'd choose tremor.     She takes Rytary at 7:30- 12:30- 5:30. She uses 1/4 tab of cd/ld PRN tremor, stiffness, or slowness. On a bad day, she will use 1/4 tab up to TID. She has days where she does not require any extra doses.      Impulsive tendencies:  Eating, sex, gambling, and spending.    History of Present Illness (HPI):  48 yo patient with history of YOPD, previously seen by my colleague Dr. Burris, last visit one year ago.    Patient describes initial symptoms of R hand tremor, progressing with mariela and CW over the past 5-7 years.  Had ICD with MPX - now not taking.  Poorly compliant with CD/LD 25/100 and has tendency to use as needed.    Wants more stability and regular follow-up.    Nonmotor/Premotor symptoms:  Hyposmia? Yes  RBD/sleep issues? Yes  Depression/anxiety? No  Constipation? No  Urinary issues? No  Sexual dysfunction? No  Orthostasis? No  Falls? No  Cognitive impairment? No  Psychoses? No  Pain? No    Per Dr. Burris's notes: 47 yo female with PD.  She takes Mirapex ER 0.75mg daily which has been titrated down due to her obsessive gambling problem. She was started on Sinemet 25/100 that she is taking 1 tablet twice daily. She is tolerating it well without major side effects, but does not think it is helpful at all for right hand tremor. She was previously on Artane with major improvement in symptoms but had to stop that due to forgetfulness and stuttering problems.  Currently she states that her symptoms are worse since March when she stopped Artane. She is having trouble with writing and balance.  Her sleep is fine and has not had any falls. Her gambling problem is improved according to her,  but still compulsive 1/week.     ROS:  Review of Systems   Constitutional:  Negative for malaise/fatigue and weight loss.   HENT:  Negative for hearing loss.    Eyes:  Negative for blurred vision and double vision.   Respiratory:  Negative for shortness of breath and stridor.    Cardiovascular:  Negative for chest pain and palpitations.   Gastrointestinal:  Negative for constipation, nausea and vomiting.   Genitourinary:  Negative for frequency and urgency.   Musculoskeletal:  Negative for falls and myalgias.   Skin:  Negative for rash.   Neurological:  Positive for tremors. Negative for focal weakness and seizures.   Endo/Heme/Allergies:  Does not bruise/bleed easily.   Psychiatric/Behavioral:  Negative for depression, hallucinations and memory loss. The patient is not nervous/anxious.      Past Medical History: The patient  has a past medical history of Anxiety, Hypertension, Mitral valve prolapse syndrome, Other chronic sinusitis, Parkinson disease (2004), and PONV (postoperative nausea and vomiting).    Social History: The patient  reports that she has never smoked. She has never used smokeless tobacco. She reports current alcohol use of about 1.0 standard drink of alcohol per week. She reports that she does not use drugs.    Family History: Their family history includes Coronary artery disease in her father; Diabetes in her father and mother; Hypertension in her mother; Neuropathy in her mother.    Allergies: Adhesive tape-silicones     Meds:   Current Outpatient Medications on File Prior to Visit   Medication Sig Dispense Refill    ALPRAZolam (XANAX) 0.25 MG tablet Take 1 tablet (0.25 mg total) by mouth 2 (two) times daily as needed for Anxiety. 20 tablet 0    amantadine HCL (SYMMETREL) 100 mg capsule TAKE 1 CAPSULE BY MOUTH TWICE A  capsule 3    carbidopa-levodopa (RYTARY) 36. mg CpSR Take 1 capsule by mouth 3 (three) times daily. 270 capsule 3    EScitalopram oxalate (LEXAPRO) 10 MG tablet Take  "1 tablet (10 mg total) by mouth once daily. 90 tablet 3    nebivoloL (BYSTOLIC) 2.5 MG Tab Take 1 tablet (2.5 mg total) by mouth once daily. 90 tablet 3    rasagiline (AZILECT) 1 mg Tab TAKE 1 TABLET BY MOUTH EVERY DAY 90 tablet 3    azelastine-fluticasone (DYMISTA) 137-50 mcg/spray Spry nassal spray 1 spray by Each Nostril route 2 (two) times daily. 23 g 0    hydrOXYzine pamoate (VISTARIL) 25 MG Cap Take 1 capsule (25 mg total) by mouth every 8 (eight) hours as needed (anxiety). 30 capsule 1    oxyCODONE (ROXICODONE) 5 MG immediate release tablet Take 1 tablet (5 mg total) by mouth every 6 (six) hours as needed for Pain. 15 tablet 0     No current facility-administered medications on file prior to visit.     Exam:  /81 (BP Location: Left arm, Patient Position: Sitting, BP Method: Medium (Automatic))   Pulse 93   Ht 5' 6" (1.676 m)   Wt 64.9 kg (143 lb 1.3 oz)   LMP 2011   BMI 23.09 kg/m²     OFF EXAM  * Specialized movement exam  Normal speech.    Mild facial masking.   Hair growing back nicely.   No tremor on R, mild mariela only on R, mod-severe L.     L chest keloid, but well-healed     No dyskinesia at all.    No other chorea, athetosis, myoclonus, or tics.   No motor impersistence.   Gait is normal-based and steady with good stride length again, arm swing is better on R.      Laboratory/Radiological:  - Results:  - Independent review of images: MRI normal     Diagnoses:          1) Early onset PD, more progressive dyskinesia and motor fluctuations.  On Azilect, Rytary, and Amantadine 100 mg daily.  2) R hip pain - stable.  Reviewed xray again, got steroids earlier  3) New L wrist pain after fall.  Hurts along the edge of the radius, perhaps in anatomic snuffbox - query small crack vs scaphoid/navicular injury.  3) Memory loss - likely some contributions from anxiety    Medical Decision Makin) DBS is great!  Will likely got other side at some point.  2) weight gain may be from reducing " dyskinesia  3) no new meds for now - declines anything for RBD  4) scar is healing well              Luis Enrique Padgett MD, MPH  Division of Movement and Memory Disorders  [facility]  [phone]

## 2024-03-06 RX ORDER — AMANTADINE HYDROCHLORIDE 100 MG/1
CAPSULE, GELATIN COATED ORAL
Qty: 180 CAPSULE | Refills: 3 | Status: SHIPPED | OUTPATIENT
Start: 2024-03-06 | End: 2024-05-06

## 2024-03-12 DIAGNOSIS — F41.1 GENERALIZED ANXIETY DISORDER: Chronic | ICD-10-CM

## 2024-03-12 RX ORDER — ESCITALOPRAM OXALATE 10 MG/1
10 TABLET ORAL
Qty: 90 TABLET | Refills: 3 | Status: SHIPPED | OUTPATIENT
Start: 2024-03-12

## 2024-03-12 NOTE — TELEPHONE ENCOUNTER
No care due was identified.  [facility] Embedded Care Due Messages. Reference number: [id number].   3/12/2024 12:07:31 AM CDT

## 2024-05-03 ENCOUNTER — TELEPHONE (OUTPATIENT)
Dept: NEUROLOGY | Facility: CLINIC | Age: 59
End: 2024-05-03
Payer: MEDICARE

## 2024-05-06 ENCOUNTER — OFFICE VISIT (OUTPATIENT)
Dept: NEUROLOGY | Facility: CLINIC | Age: 59
End: 2024-05-06
Payer: MEDICARE

## 2024-05-06 DIAGNOSIS — G20.B2 PARKINSON'S DISEASE WITH DYSKINESIA AND FLUCTUATING MANIFESTATIONS: Primary | Chronic | ICD-10-CM

## 2024-05-06 PROCEDURE — 99215 OFFICE O/P EST HI 40 MIN: CPT | Mod: 95,,, | Performed by: PSYCHIATRY & NEUROLOGY

## 2024-05-06 PROCEDURE — 95971 ALYS SMPL SP/PN NPGT W/PRGRM: CPT | Mod: 95,,, | Performed by: PSYCHIATRY & NEUROLOGY

## 2024-05-06 RX ORDER — LEVODOPA AND CARBIDOPA 145; 36.25 MG/1; MG/1
1 CAPSULE, EXTENDED RELEASE ORAL 3 TIMES DAILY
Qty: 270 CAPSULE | Refills: 3 | Status: SHIPPED | OUTPATIENT
Start: 2024-05-06

## 2024-05-06 RX ORDER — RASAGILINE 1 MG/1
1 TABLET ORAL DAILY
Qty: 90 TABLET | Refills: 3 | Status: SHIPPED | OUTPATIENT
Start: 2024-05-06

## 2024-05-06 RX ORDER — AMANTADINE HYDROCHLORIDE 100 MG/1
TABLET ORAL
Qty: 60 TABLET | Refills: 11 | Status: SHIPPED | OUTPATIENT
Start: 2024-05-06

## 2024-05-06 NOTE — ASSESSMENT & PLAN NOTE
YOPD with dyskinesias  S/P DBS  No post op complications  Continues on program 4 - increased today 2.0 to 2.3    1- new  2- fallback  3- new2  4- new3 - like 2 but better foot tapping    We retrained on how to use the   Suggested take Rytary 145mg TID regularly  Could try amanatdine 50mg BID to avoid FOG in PM  Suggested PT

## 2024-05-06 NOTE — PROGRESS NOTES
"The patient location is: HOME  The chief complaint leading to visit is: iPD  1. Parkinson's disease with dyskinesia and fluctuating manifestations  Ambulatory referral/consult to Physical/Occupational Therapy        Visit type: Virtual visit with synchronous audio and video  Total time spent with patient: 40  Each patient to whom he or she provides medical services by telemedicine is:  (1) informed of the relationship between the physician and patient and the respective role of any other health care provider with respect to management of the patient; and (2) notified that he or she may decline to receive medical services by telemedicine and may withdraw from such care at any time.      Interval Hx  Since last visit,   Left on program  4 and turned it up slightly to 2.1 from 2.0  Did not yet try other programs  R foot FOG is still present when meds wear off    Small honeymoon period for 1 week  No complications  Dyskinesia much better  R handed  Nonmotor symptoms now much better - no sweat attacks    L side now shaking at times    Rytary 145mg to TID and struggling with FOG  Plus Amantadine 100mg  daily  Livedo reticularis now gone after decrease to daily  Sleeps poorly - takes melatonin inconsistently    "priorHPI: 58 y.o. woman with HTN, anxiety, R hip replacement, MVP, with iPD starting 2004 coming for DBS eval per Dr. Padgett. She notes in 2004 R hand 5th digit started to have a postural tremor. This progressed to the arm that years. Her gait was uninvolved until much more recently. Fell and broke elbow 2017. R hip pain began and grew until she was wheelchair due to pain until a repair 2018 (revised several times). After surgery she started feeling imbalanced. Does not fall but trips at times. Does not use an assist device. Can walk 2 blocks before out of breath.  (Pulmonology workup ongoing). Dyskinesias started 4 years ago. At times she can rock herself out of a chair (sliding down and out). Mild R foot " "dystonia started 4 years ago.  Some FOG when she turns x 4 years (when OFF)."    Sweats profusely when medications wear off    2 brothers and sister. No neuro issues.  Parents without neuro issues.  Has 2 boys- 40 and 38.    Med hx  Did not take medications till 2010  Started carbidopa/levodopa 25/100mg and amantadine    Current medications  -Rytary 145 1 tab TID [id number].  Lasting 4-5 hours before noticing effect wears off.    When it wears off her walking slows r>L.  -Dyskinesias continue, trying amantidine 100 mg Qdaily with about 70% relief.    Auditory hallucinations at times  Mild orthostasis    DBS expectations    She'd like to decrease medications to decrease dyskinesias  She'd like to be able to better balance  She wants her speech top be clearer  She'd like more energy  She'd like her hip pain  to resolve - but unclear if is from prior hip surgery    Had neuropsych testing 1 year prior    "Prior  Interval History:  - amantadine caused livedo, she's generally ok with   - more dyskinesia and dystonia of R arm and leg  - some falls, usually happens with a trip but more dramatic than if she did not have PD  - no big injuries, feels her vital proteins are helping  - gambling is still an issue, playing video poker machines - at the one place she goes, but has had wins and losses of $1000+ at a time  - eating healthy, sex drive is higher than her partner, says it is ok with their relationship  - NOW is finally interested in DBS, not intersted in any med changes at this time    From 9/30/21:  - 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, reduced down to only 1 tab a day and the reti remains  - rytary 95 TID - if higher, gets back dyskinesia  - getting PT TID, at [facility] on [address]  - lost about 6 lbs, weighs about 130  - really feeling only slightly off at times  - willing to try 145s  - enjoying correction    2021:  - feeling more progression, more into the left  - more left hand tremor  - feels rytary " "wearing off, in the dip at 3-4 hours  - tingling in fingers and toes are periodically  - R hip is tighter than ever, post surgery 3 years ago - more pain now  - some worry about memory in the house, more micorwave issues "  -     From 2/19/20  -Rytary 145 1 tab TID [id number].  Lasting 4-5 hours before noticing effect wears off.    -Dyskinesias continue, trying amantidine 100 mg Qdaily with about 70% relief.  However, significantly worsens with stress.  -Continues on azilect, tolerating well  -Last fall, September 2019.  Using walker without further falls.    -Lots of stress at work with new boss.    -Memory loss - forgetting names of kids, best friends.  -Describes stabbing pain in R buttocks, no radiation.  Worse in AM.  Trying stretching, massage.  Feels similar pain to prior when she had R hip arthroplasty.      From 11/2019  - still having the Rytary dip  - more dystonia of the R side affecting gait  - not happy with her doing worse  - out of rytary 95s  - more freezing when off  - some ICD with shopping and eating and gambling    June 2019:  - last seen last year  - more off time, better stronger percieved at 3 hours ("Rytary dip")  - takes 1/2 tab cd/ld at 1 hr, then she gets dyskinetic  - no new tremor  - more dy skinesia  - cog ok  - mood stable  - no new bowel or bladder issues    From May 2018:   - 3 hip surgeries, last one was fully replaced R side on May 23rd.  Had some wacky dreams in the post-op ion January, better now.  Previous plate screws in R hip not working, then they added a amelie, and failed as well with poor healing.  Then replaced the whole thing.  Feels well overall now.     - now with a bit of pain but ok  - less dyskinesia now  -adds a little IR in the am (1/4 to 1/2) to boost her on      From Aug 2017:  - taking rytary 145 1 tab tid plus adds in cd/ld 25/100 1/4 tabs as needed  - doing well overall  - feels gait si a bit crooked  - still fairly brittle with dyskinesia  - declines dbs " or duopa  - might be good candidate for neuroderm patch study  - dx with jose schuster of hand      From 6/2016 with Alisia:  Dyskinesias are not bad until she gets anxious. Mainly notes in the RUE/ RLE.  Tends to note dyskinesias in the evening around 7 to 7:30 PM, which annoys her as she is home and eating supper and wants to relax. She mentions that if could choose to have tremor or dyskinesias, she'd choose tremor.     She takes Rytary at 7:30- 12:30- 5:30. She uses 1/4 tab of cd/ld PRN tremor, stiffness, or slowness. On a bad day, she will use 1/4 tab up to TID. She has days where she does not require any extra doses.      Impulsive tendencies:  Eating, sex, gambling, and spending.    History of Present Illness (HPI):  46 yo patient with history of YOPD, previously seen by my colleague Dr. Burris, last visit one year ago.    Patient describes initial symptoms of R hand tremor, progressing with mariela and CW over the past 5-7 years.  Had ICD with MPX - now not taking.  Poorly compliant with CD/LD 25/100 and has tendency to use as needed.    Wants more stability and regular follow-up.    Nonmotor/Premotor symptoms:  Hyposmia? Yes  RBD/sleep issues? Yes  Depression/anxiety? No  Constipation? No  Urinary issues? No  Sexual dysfunction? No  Orthostasis? No  Falls? No  Cognitive impairment? No  Psychoses? No  Pain? No    Per Dr. Burris's notes: 45 yo female with PD.  She takes Mirapex ER 0.75mg daily which has been titrated down due to her obsessive gambling problem. She was started on Sinemet 25/100 that she is taking 1 tablet twice daily. She is tolerating it well without major side effects, but does not think it is helpful at all for right hand tremor. She was previously on Artane with major improvement in symptoms but had to stop that due to forgetfulness and stuttering problems.  Currently she states that her symptoms are worse since March when she stopped Artane. She is having trouble with writing and balance.  Her  sleep is fine and has not had any falls. Her gambling problem is improved according to her, but still compulsive 1/week.     ROS:  Review of Systems   Constitutional:  Negative for fever, malaise/fatigue and weight loss.   HENT:  Negative for congestion and hearing loss.    Eyes:  Negative for blurred vision and double vision.   Respiratory:  Negative for cough, shortness of breath and stridor.    Cardiovascular:  Negative for chest pain, palpitations and leg swelling.   Gastrointestinal:  Negative for constipation, nausea and vomiting.   Genitourinary:  Negative for dysuria, frequency and urgency.   Musculoskeletal:  Positive for falls. Negative for myalgias.   Skin:  Negative for rash.   Neurological:  Positive for tremors. Negative for speech change, focal weakness, seizures and headaches.   Endo/Heme/Allergies:  Does not bruise/bleed easily.   Psychiatric/Behavioral:  Negative for depression, hallucinations and memory loss. The patient is not nervous/anxious.      Past Medical History: The patient  has a past medical history of Anxiety, Hypertension, Mitral valve prolapse syndrome, Other chronic sinusitis, Parkinson disease (2004), and PONV (postoperative nausea and vomiting).    Social History: The patient  reports that she has never smoked. She has never used smokeless tobacco. She reports current alcohol use of about 1.0 standard drink of alcohol per week. She reports that she does not use drugs.    Family History: Their family history includes Coronary artery disease in her father; Diabetes in her father and mother; Hypertension in her mother; Neuropathy in her mother.    Allergies: Adhesive tape-silicones     Meds:   Current Outpatient Medications on File Prior to Visit   Medication Sig Dispense Refill    ALPRAZolam (XANAX) 0.25 MG tablet Take 1 tablet (0.25 mg total) by mouth 2 (two) times daily as needed for Anxiety. 20 tablet 0    carbidopa-levodopa (RYTARY) 36. mg CpSR Take 1 capsule by mouth 3  "(three) times daily. 270 capsule 3    EScitalopram oxalate (LEXAPRO) 10 MG tablet TAKE 1 TABLET BY MOUTH EVERY DAY 90 tablet 3    nebivoloL (BYSTOLIC) 2.5 MG Tab Take 1 tablet (2.5 mg total) by mouth once daily. 90 tablet 3    rasagiline (AZILECT) 1 mg Tab TAKE 1 TABLET BY MOUTH EVERY DAY 90 tablet 3    [DISCONTINUED] amantadine HCL (SYMMETREL) 100 mg capsule TAKE 1 CAPSULE BY MOUTH TWICE A  capsule 3     No current facility-administered medications on file prior to visit.     Exam:  [facility] 12/28/2011       MOTOR EXAMINATION (OFF)       Speech  0 - Normal.   Facial Expression  1 - Minimal Hypomimia, could be normal "Poker Face".   Tremor at Rest:      Face, lips, chin 0 - Absent.    Hands:      right 3 - Moderate in amplitude and present most of the time.   left 2 - Mild in amplitude and persistent. Or moderate in amplitude, but only intermittently present.    Feet:      right 0 - Absent.    left 0 - Absent.    Action or Postural Tremor of Hands      right 1 - Slight; present with action.   left 1 - Slight; present with action.                           Finger Taps      right 3 - Severely impaired. Frequent hesitation in initiating movements or arrests in ongoing movement.   left 2 - Moderately impaired. Definite and early fatiguing. May have occasional arrests in movement.   Hand Movements      right 2 - Moderately impaired. Definite and early fatiguing. May have occasional arrests in movement.   left 1 - Mild slowing and/or reduction in amplitude.   Rapid Alternating Movements of Hands      right 3 - Severely impaired. Frequent hesitation in initiating movements or arrests in ongoing movement.   left 2 - Moderately impaired. Definite and early fatiguing. May have occasional arrests in movement.   Leg Agility      right 3 - Severely impaired. Frequent hesitation in initiating movements or arrests in ongoing movement.   left 2 - Moderately impaired. Definite and early fatiguing. May have occasional arrests in " movement.   Arising from Chair  1 - Slow; or may need more than one attempt.   Posture  1 - Not quite erect, slightly stooped posture; could be normal for older person.   Gait  1 - Walks slowly, may shuffle with short steps, but no festination (hastening steps) or propulsion.       Body Bradykinesia and Hypokinesia (Combining slowness, hesitancy, decreased armswing, small amplitude, and poverty of movement in general)  1 - Minimal slowness, giving movement a deliberate character; could be normal for some persons. Possibly reduced amplitude.     DBS MRI Compatibility INFO  05/06/2024    IPG Model(s) BS Genus R16    Serial No. [id number]   Impedance NL       Pocket Adapter  None     Programming    _______________________________________    LEFT GPI 90/130  C&3 @2.0mA no imoprovement  C&2 @2.0mA better walking  C&1 @1.0mA better hand tapping  C&0 @0.7mA phosphenes    Initial    Program 2    Program 1          FINAL  1- new  2- fallback  3- new2  4- new3 - like 2 but tricia foot tapping            FINAL  Program 3 - blocked low to avoid phosphes    Program 2    Program 1          Final settings:      Laboratory/Radiological:  - Results:  - Independent review of images: MRI normal 2011    Problem List Items Addressed This Visit          Neuro    PD (Parkinson's disease) - Primary (Chronic)    Overview     neruop managing dr abigail interiano current . Will be having deep brain stimulation procedure with dr miriam carver 9/26/23 .          Current Assessment & Plan     YOPD with dyskinesias  S/P DBS  No post op complications  Continues on program 4 - increased today 2.0 to 2.3    1- new  2- fallback  3- new2  4- new3 - like 2 but better foot tapping    We retrained on how to use the   Suggested take Rytary 145mg TID regularly  Could try amanatdine 50mg BID to avoid FOG in PM  Suggested PT         Relevant Orders    Ambulatory referral/consult to Physical/Occupational Therapy     I spent 5 minutes    Ariella Rao MD,  MS [facility]  Department of Neurology  Movement Disorders

## 2024-05-08 ENCOUNTER — OFFICE VISIT (OUTPATIENT)
Dept: UROLOGY | Facility: CLINIC | Age: 59
End: 2024-05-08
Payer: MEDICARE

## 2024-05-08 VITALS
BODY MASS INDEX: 22.96 KG/M2 | HEIGHT: 66 IN | SYSTOLIC BLOOD PRESSURE: 132 MMHG | DIASTOLIC BLOOD PRESSURE: 78 MMHG | HEART RATE: 71 BPM | WEIGHT: 142.88 LBS

## 2024-05-08 DIAGNOSIS — R35.0 URINARY FREQUENCY: Primary | ICD-10-CM

## 2024-05-08 PROCEDURE — 1159F MED LIST DOCD IN RCRD: CPT | Mod: CPTII,S$GLB,, | Performed by: UROLOGY

## 2024-05-08 PROCEDURE — 3078F DIAST BP <80 MM HG: CPT | Mod: CPTII,S$GLB,, | Performed by: UROLOGY

## 2024-05-08 PROCEDURE — 3008F BODY MASS INDEX DOCD: CPT | Mod: CPTII,S$GLB,, | Performed by: UROLOGY

## 2024-05-08 PROCEDURE — 99204 OFFICE O/P NEW MOD 45 MIN: CPT | Mod: S$GLB,,, | Performed by: UROLOGY

## 2024-05-08 PROCEDURE — 3075F SYST BP GE 130 - 139MM HG: CPT | Mod: CPTII,S$GLB,, | Performed by: UROLOGY

## 2024-05-08 PROCEDURE — 99999 PR PBB SHADOW E&M-EST. PATIENT-LVL III: CPT | Mod: PBBFAC,,, | Performed by: UROLOGY

## 2024-05-08 RX ORDER — MIRABEGRON 50 MG/1
50 TABLET, EXTENDED RELEASE ORAL DAILY
Qty: 30 TABLET | Refills: 11 | Status: SHIPPED | OUTPATIENT
Start: 2024-05-08 | End: 2025-05-08

## 2024-05-08 RX ORDER — LIDOCAINE HYDROCHLORIDE 20 MG/ML
JELLY TOPICAL ONCE
OUTPATIENT
Start: 2024-05-08 | End: 2024-05-08

## 2024-05-08 NOTE — PROGRESS NOTES
[facility] Department of U[facility]      New Neurogenic Bladder (NGB) Note    5/8/2024    Referred by:  No ref. provider found    HPI: Tika Navarro is a very pleasant 58 y.o. female referred for evaluation of urinary frequency (thought possibly to be of neurogenic origin) of several years duration. She currently does not perform intermittent catheterization and voids on own.  The underlying etiology is thought to be most possibly Parkinson disease, though it seems possible she simply has OAB based on symptom timing. She was diagnosed in 2004 with Parkinson disease and dyskinesia started about 4 years ago. She has had implantation of DBS. She has sleep disturbances. She denies symptoms of voiding difficulty including decreased stream, hesitancy, intermittency, post void dribbling, and sense of incomplete emptying. Bladder scan PVR was 3mL. She denies symptoms of irritative voiding including dysuria. There is no history of recurrent urinary tract infection.  The patient does not report symptoms suggestive of advanced POP. There are no reports of symptoms suggestive of autonomic dysreflexia including headaches, blurry vision, nasal congestion, high blood pressure or profuse sweating in response to bladder or bowel stimulation.     Relevant Medications:  No current medications that would be anticipated to impair voiding    She does not report prior urodynamic evaluation. She does not reports prior cystoscopic evaluation.     A review of 10+ systems was conducted with pertinent positive and negative findings documented in HPI with all other systems reviewed and negative.    Past medical, family, surgical and social history reviewed as documented in chart with pertinent positive medical, family, surgical and social history detailed in HPI.    Exam Findings:    Const: no acute distress, conversant and alert  Eyes: anicteric, extraocular muscles intact  ENMT: normocephalic, Nl oral membranes  Cardio: no cyanosis, nl cap  refill  Pulm: no tachypnea; no resp distress  Abd: soft, no tenderness  Musc: no laceration, no tenderness  Neuro: alert; oriented x 3  Skin: warm, dry; no petichiae  Psych: no anxiety; normal speech          Assessment/Plan:    Urinary Frequency and Urgency (new, addt'l workup): She reports lower urinary tract symptoms that may or may not be attributable to Parkinson disease. The patient has no evidence of chronic urinary retention requiring catheterization. , The patient does not have bothersome urinary incontinence which needs to be addressed but is bothered by urinary frequency and urgency. Given the possibility of anatomic or functional obstruction as an additional competing etiology, I have recommended urodynamic evaluation.     Schedule urodynamic evaluation  Begin Myrbetriq 50 mg for frequency and Urgency

## 2024-05-10 ENCOUNTER — OFFICE VISIT (OUTPATIENT)
Dept: PODIATRY | Facility: CLINIC | Age: 59
End: 2024-05-10
Payer: MEDICARE

## 2024-05-10 VITALS
HEART RATE: 78 BPM | WEIGHT: 143.06 LBS | HEIGHT: 66 IN | SYSTOLIC BLOOD PRESSURE: 129 MMHG | DIASTOLIC BLOOD PRESSURE: 77 MMHG | BODY MASS INDEX: 22.99 KG/M2

## 2024-05-10 DIAGNOSIS — L60.9 DISEASE OF NAIL: Primary | ICD-10-CM

## 2024-05-10 PROCEDURE — 3074F SYST BP LT 130 MM HG: CPT | Mod: CPTII,S$GLB,, | Performed by: PODIATRIST

## 2024-05-10 PROCEDURE — 87101 SKIN FUNGI CULTURE: CPT | Performed by: PODIATRIST

## 2024-05-10 PROCEDURE — 1159F MED LIST DOCD IN RCRD: CPT | Mod: CPTII,S$GLB,, | Performed by: PODIATRIST

## 2024-05-10 PROCEDURE — 99203 OFFICE O/P NEW LOW 30 MIN: CPT | Mod: S$GLB,,, | Performed by: PODIATRIST

## 2024-05-10 PROCEDURE — 99999 PR PBB SHADOW E&M-EST. PATIENT-LVL III: CPT | Mod: PBBFAC,,, | Performed by: PODIATRIST

## 2024-05-10 PROCEDURE — 3078F DIAST BP <80 MM HG: CPT | Mod: CPTII,S$GLB,, | Performed by: PODIATRIST

## 2024-05-10 PROCEDURE — 3008F BODY MASS INDEX DOCD: CPT | Mod: CPTII,S$GLB,, | Performed by: PODIATRIST

## 2024-05-13 ENCOUNTER — PATIENT MESSAGE (OUTPATIENT)
Dept: NEUROLOGY | Facility: CLINIC | Age: 59
End: 2024-05-13
Payer: MEDICARE

## 2024-05-15 NOTE — PROGRESS NOTES
"Subjective:      Patient ID: Tika Navarro is a 58 y.o. female.    Chief Complaint: Nail Problem (Left hallux nail)    Tika DECKER is a 58 y.o. female who presents to the clinic complaining of thick and discolored toenails on the left foot. Tika DECKER is inquiring about treatment options.    Review of Systems   Constitutional: Negative for chills, fever and malaise/fatigue.   HENT:  Negative for hearing loss.    Cardiovascular:  Negative for claudication.   Respiratory:  Negative for shortness of breath.    Skin:  Positive for nail changes. Negative for flushing and rash.   Musculoskeletal:  Negative for joint pain and myalgias.   Neurological:  Negative for loss of balance, numbness, paresthesias and sensory change.   Psychiatric/Behavioral:  Negative for altered mental status.            Objective:      Physical Exam  Vitals reviewed.   Cardiovascular:      Pulses:           Dorsalis pedis pulses are 2+ on the right side and 2+ on the left side.        Posterior tibial pulses are 2+ on the right side and 2+ on the left side.      Comments: No edema noted b/L  Musculoskeletal:      Comments:        Feet:      Right foot:      Protective Sensation: 5 sites tested.  5 sites sensed.      Left foot:      Protective Sensation: 5 sites tested.  5 sites sensed.      Toenail Condition: Left toenails are abnormally thick.   Skin:     General: Skin is warm.      Capillary Refill: Capillary refill takes 2 to 3 seconds.      Comments: Normal skin tugor noted.   No open lesion noted b/L  Skin temp is warm to warm from proximal to distal b/L.  Webspaces clean, dry, and intact     Neurological:      Mental Status: She is alert.      Comments: Gross sensation intact b/L               Assessment:       Encounter Diagnosis   Name Primary?    Disease of nail - Left Foot Yes         Plan:       Tika Lorenzo" was seen today for nail problem.    Diagnoses and all orders for this visit:    Disease of nail - Left Foot  -     " CULTURE, FUNGUS - SKIN, HAIR, OR NAILS      I counseled the patient on her conditions, their implications and medical management.    Pt was seen today for changes in toenail. Pt was advised that nail changes could be due to toenail fungus or damage/trauma to toenail or toenail matrix.   Pt advised that there are currently no prescription options for nail damage/trauma.   Fungal culture taken of toenail? yes  Treatment options discussed with pt for toenails positive for toenail fungus including oral lamisil (that requires a hepatic function test) and topical treatments such as penlac and DMSO compound formula.   Pt advised she will be notified of culture results.       .

## 2024-05-16 DIAGNOSIS — I10 BENIGN ESSENTIAL HYPERTENSION: ICD-10-CM

## 2024-05-16 RX ORDER — NEBIVOLOL 2.5 MG/1
2.5 TABLET ORAL
Qty: 90 TABLET | Refills: 3 | Status: SHIPPED | OUTPATIENT
Start: 2024-05-16

## 2024-05-16 NOTE — TELEPHONE ENCOUNTER
No care due was identified.  Health [facility] Embedded Care Due Messages. Reference number: [id number].   5/16/2024 12:11:25 AM CDT

## 2024-05-20 ENCOUNTER — CLINICAL SUPPORT (OUTPATIENT)
Dept: REHABILITATION | Facility: HOSPITAL | Age: 59
End: 2024-05-20
Attending: PSYCHIATRY & NEUROLOGY
Payer: MEDICARE

## 2024-05-20 DIAGNOSIS — G20.B2 PARKINSON'S DISEASE WITH DYSKINESIA AND FLUCTUATING MANIFESTATIONS: Chronic | ICD-10-CM

## 2024-05-20 DIAGNOSIS — Z74.09 IMPAIRED FUNCTIONAL MOBILITY, BALANCE, GAIT, AND ENDURANCE: Primary | ICD-10-CM

## 2024-05-20 PROCEDURE — 97162 PT EVAL MOD COMPLEX 30 MIN: CPT | Mod: PO

## 2024-05-20 NOTE — PROGRESS NOTES
Thank you for your referral. Please see Treatment Tab for Physical Therapy Initial Evaluation and Plan of Care. Thanks!

## 2024-05-21 NOTE — PLAN OF CARE
[facility]   Physical Therapy Initial Evaluation      Name: Tika Navarro  Clinic Number: [id number]    Therapy Diagnosis:   Encounter Diagnoses   Name Primary?    Parkinson's disease with dyskinesia and fluctuating manifestations     Impaired functional mobility, balance, gait, and endurance Yes        Physician: Ariella Rao MD    Physician Orders: PT Eval and Treat   Medical Diagnosis from Referral: G20.B2 (ICD-10-CM) - Parkinson's disease with dyskinesia and fluctuating manifestations   Evaluation Date: 5/20/2024  Authorization Period Expiration: 5/6/25  Plan of Care Expiration: 7/5/24  Progress Note Due: 6/14/24  Date of Surgery: Deep brain stimulator implantation 10/3/24  Visit # / Visits authorized: 1/ 1   FOTO: 1/ 3    Precautions: Standard and Fall     Time In: 1345  Time Out: 1428  Total Billable Time: 43 minutes    Subjective     Date of onset: Diagnosed with PD in 2004, had deep brain stimulator implanted in October of 2023    History of current condition - Kusum reports: She fell at [employer] the other day but was not injured. She said she suddenly felt off balance when looking over her shoulder. Denies feeling lightheaded. Previously used assistive device but does not currently. Only uses rollator walker to unload groceries. Reports difficulty bending over to tie shoes due to decreased right hip range of motion.     Per Neurology Visit 5/6/24:    Interval Hx  Since last visit,   Left on program  4 and turned it up slightly to 2.1 from 2.0  Did not yet try other programs  R foot FOG is still present when meds wear off     Small honeymoon period for 1 week  No complications  Dyskinesia much better  R handed  Nonmotor symptoms now much better - no sweat attacks     L side now shaking at times     Rytary 145mg to TID and struggling with FOG  Plus Amantadine 100mg  daily  Livedo reticularis now gone after decrease to daily  Sleeps poorly - takes melatonin inconsistently    "  "priorHPI: 58 y.o. woman with HTN, anxiety, R hip replacement, MVP, with iPD starting 2004 coming for DBS eval per Dr. Padgett. She notes in 2004 R hand 5th digit started to have a postural tremor. This progressed to the arm that years. Her gait was uninvolved until much more recently. Fell and broke elbow 2017. R hip pain began and grew until she was wheelchair due to pain until a repair 2018 (revised several times). After surgery she started feeling imbalanced. Does not fall but trips at times. Does not use an assist device. Can walk 2 blocks before out of breath.  (Pulmonology workup ongoing). Dyskinesias started 4 years ago. At times she can rock herself out of a chair (sliding down and out). Mild R foot dystonia started 4 years ago.  Some FOG when she turns x 4 years (when OFF)."     Sweats profusely when medications wear off     2 brothers and sister. No neuro issues.  Parents without neuro issues.  Has 2 boys- 40 and 38.     Med hx  Did not take medications till 2010  Started carbidopa/levodopa 25/100mg and amantadine    Falls: One, denies near falls     Imaging:     CT Head 9/26/24:   Impression:     Interval placement of left frontal coursing deep brain stimulator, without apparent complication.     No acute intracranial hemorrhage or major vascular territory infarct.    Prior Therapy: Physical therapy for right hip following sx approximately 2 years ago, physical therapy for elbow dislocation   Social History: Lives with boyfriend in 2-story home, one step to enter. 14 steps to second floor. Reports using bilateral hand rails and occasionally side stepping to navigate stairs. Two- steps down to get in shower indoors, has step over shower outdoors.   Occupation: Out on disability  Prior Level of Function: Independent and driving  Current Level of Function: Independent, feels more confident driving since DBS implantation  Sleep: "I just don't sleep", says she worries about everything - naps easily " "  Physical Activity: No exercise - "I hate exercise and anything that makes me sweat"  DME: Rollator, cane, bath seat, bed rail    Pain:   Current 0/10, worst 9/10, best 0/10   Location: left buttocks radiating down to left foot, foot sometimes gets pins and needles  Description: Radiating  Aggravating Factors: Standing still   Easing Factors: sitting down for short period of time, laying down, walking    Patients goals: "To feel better, improve range of motion and to improve balance. To be more steady when standing still."      Medical History:   Past Medical History:   Diagnosis Date    Anxiety     Hypertension     Mitral valve prolapse syndrome     Other chronic sinusitis     Parkinson disease 2004    Right tremor type    PONV (postoperative nausea and vomiting)        Surgical History:   Tika Navarro  has a past surgical history that includes TONSILLECTOMY, ADENOIDECTOMY; Breast surgery; Fracture surgery (1/1/2018); Joint replacement (1/1/2018); Placement of fiducial screw into spine (N/A, 9/26/2023); Deep brain stimulator placement (Left, 9/26/2023); and Insertion of deep brain stimulator generator (Left, 10/3/2023).    Medications:   Tika DECKER has a current medication list which includes the following prescription(s): alprazolam, amantadine hcl, rytary, escitalopram oxalate, mirabegron, nebivolol, and rasagiline.    Allergies:   Review of patient's allergies indicates:   Allergen Reactions    Adhesive tape-silicones Blisters, Itching, Rash and Swelling        Objective      Mental status: Alert and oriented based on interview. Casually dressed and cooperative throughout.      Vision/ Auditory: Wears contacts    Posture:   Sitting: WNL, resting tremor left hand  Standing: WNL, resting tremor left hand    Transfers:    Transfers Level of Assistance  Comments   Roll Right Ind.    Roll Left "Ind.    Roll Supine "Ind.    Roll Prone "Ind.    Supine to Sit "Ind.    Sit to Supine "Ind.    Sit to Stand Melani. " "Use of hands   Stand to Sit Melani. Use of hands   Transfer from bed to chair "Ind.    Car transfer "Ind.    Toilet Transfer Melani. Per patient, use of grab bars or wall for balance       Strength:      Right Lower Extremity Strength Grade Left Lower Extremity Strength Grade   Hip Flexion 4+/5 Hip Flexion 4+/5   Hip Extension 3+/5 Hip Extension 3+/5   Knee Flexion 4+/5 Knee Flexion 4+/5   Knee Extension 4+/5 Knee Extension 4+/5   Ankle Dorsiflexion 4+/5 Ankle Dorsiflexion 4+/5   Hip abduction 4+/5 Hip abduction 4+/5     Range of Motion:    Left hip flexion PROM: 110 deg   Right hip flexion PROM: 90 deg flex     Coordination:    Upper Extremity:    Dysdiadochokinesia: Impaired, decreased range of motion    Finger to nose: Impaired, slightly decreased speed on left     Lower Extremity:    Alternating toe taps: Impaired, poor sequencing    Heel-shin: Not assessed       Light touch sensation:    Right Lower Extremity: Intact   Left Lower Extremity: Intact     Proprioception: Not formally assessed    Modified Anayeli: Not formally assesed    Balance:      Evaluation    Tandem Stance R LE forward, eyes open 12 sec  (<30 sec = Increased FALL RISK)   Tandem Stance L LE forward, eyes open 30 sec  (<30 sec = Increased FALL RISK)   Single Limb Stance R LE  eyes open 12 sec  (<10 sec = HIGH FALL RISK)   Single Limb Stance L LE  eyes open 6 sec  (<10 sec = HIGH FALL RISK)         Postural control: MCTSIB: Evaluation   1. Eyes Open/feet together/Firm: 30 seconds   2. Eyes Closed/feet together/Firm:  30  seconds   3. Eyes Open/feet together/Foam:  30 seconds   4. Eyes Closed/feet together/Foam:  13 seconds - mod sway       Ambulation:     Gait Assessment:   - AD used: None  - Assistance: Supervision  - Distance: 150 ft  - Speed: 1.0 m/s  - Stairs: Mod I - Lateral step up with right hand rail on ascent, step down with step-to pattern and left hand rail     Gait Analysis:  Upon observation of gait, patient demonstrates deviations " "including but not limited to: Decreased arm swing (worse on left) and decreased step length bilaterally    Impairments contributing to deviations:  Effects of Parkinson's Disease      APTA Core Set Outcome Measures for Adults with Neurologic Conditions    Evaluation  Reference values    Timed Up and Go  TU sec   TUG Cognitive: 17 sec   TUG Manual: 13 sec   score >14 seconds indicates risk for falls in older stroke patients (Juliana et al, 2006)   10 Meter Walk Test  1.0 m/sec (6m/6s)   "Community ambulator" speed category   0-0.4 m/s = household walker  0.4-0.8 m/s = limited community ambulator   0.8-1.4 m/s = community ambultor  >1.4 m/s = can cross street safely    Functional Gait Assessment   < = identifies fall risk in community dwelling older adults   (MCID = 4)   Villaseñor Balance Test Not Assessed   Fall risk cut-off for stroke= 45/56   6 Minute Walk Test   Not Assessed - Assess Next Visit 6 Minute Walk Test Distances: Means by Age and Gender    Age Gender Mean   60-69 Female  Male 572 meters (1876 feet)  538 meters (1765 feet)   70-79 Female  Male 527 meters (1729 feet)  471 meters (1545 feet)   80-89 Female  Male 417 meters (1368 feet)  392 meters ( 1286 feet)    JENNI Denney (2000)      5 times sit-stand   35 seconds    2 without hands, 3 with hands >12 sec= fall risk for general elderly  >10 sec= balance/vestibular dysfunction (<61 y/o)  >14.2 sec= balance/vestibular dysfunction (>61 y/o)  >12 sec= fall risk for stroke     Functional Gait Assessment:   1. Gait on level surface =  2 (6 sec)   (3) Normal: less than 5.5 sec, no A.D., no imbalance, normal gait pattern, deviates< 6in   (2) Mild impairment: 7-5.6 sec, uses A.D., mild gait deviations, or deviates 6-10 in   (1) Moderate impairment: > 7 sec, slow speed, imbalance, deviates 10-15 in.   (0) Severe impairment: needs assist, deviates >15 in, reach/touch wall  2. Change in Gait Speed = 3   (3) Normal: smooth change w/o loss of balance or " gait deviation, deviates < 6 in, significant difference between speeds   (2) Mild impairment: changes speed, but demonstrates mild gait deviations, deviates 6-10 in, OR no deviations but unable to significantly speed, OR uses A.D.   (1) Moderate impairment: minor changes to speed, OR changes speed w/ significant deviations, deviates 10-15 in, OR  Changes speed , but loses balance & recovers   (0) Severe impairment: cannot change speed, deviates >15 in, or loses balance & needs assist  3. Gait with horizontal head turns  = 2   (3) Normal: no change in gait, deviates <6 in   (2) Mild impairment: slight change in speed, deviates 6-10 in, OR uses A.D.   (1) Moderate impairment: moderate change in speed, deviates 10-15 in   (0) Severe impairment: severe disruption of gait, deviates >15in  4. Gait with vertical head turns = 2   (3) Normal: no change in gait, deviates <6 in   (2) Mild impairment: slight change in speed, deviates 6-10 in OR uses A.D.   (1) Moderate impairment: moderate change in speed, deviates 10-15 in   (0) Severe impairment: severe disruption of gait, deviates >15 in  5. Gait with pivot turns = 3   (3) Normal: performs safely in 3 sec, no LOB   (2) Mild impairment: performs in >3 sec & no LOB, OR turns safely & requires several steps to regain LOB   (1) Moderate impairment: turns slow, OR requires several small steps for balance following turn & stop   (0) Severe impairment: cannot turn safely, needs assist  6. Step over obstacle = 3   (3) Normal: steps over 2 stacked boxes w/o change in speed or LOB   (2) Mild impairment: able to step over 1 box w/o change in speed or LOB   (1) Moderate impairment: steps over 1 box but must slow down, may require VC   (0) Severe impairment: cannot perform w/o assist  7. Gait with Narrow ROSELIA = 2   (3) Normal: 10 steps no staggering   (2) Mild impairment: 7-9 steps   (1) Moderate impairment: 4-7 steps   (0) Severe impairment: < 4 steps or cannot perform w/o assist  8.  "Gait with eyes closed = 1 (10 sec)   (3) Normal: < 7 sec, no A.D., no LOB, normal gait pattern, deviates <6 in   (2) Mild impairment: 7.1-9 sec, mild gait deviations, deviates 6-10 in   (1) Moderate impairment: > 9 sec, abnormal pattern, LOB, deviates 10-15 in   (0) Severe impairment: cannot perform w/o assist, LOB, deviates >15in  9. Ambulating Backwards = 2   (3) Normal: no A.D., no LOB, normal gait pattern, deviates <6in   (2) Mild impairment: uses A.D., slower speed, mild gait deviations, deviates 6-10 in   (1) Moderate impairment: slow speed, abnormal gait pattern, LOB, deviates 10-15 in   (0) Severe impairment: severe gait deviations or LOB, deviates >15in  10. Steps = 1   (3) Normal: alternating feet, no rail   (2) Mild Impairment: alternating feet, uses rail   (1) Moderate impairment: step-to, uses rail   (0) Severe impairment: cannot perform safely    Score 21/30     Score:   <22/30 fall risk   <20/30 fall risk in older adults   <18/30 fall risk in Parkinsons       FOTO Degenerative CNS Disorders Survey:       Therapist reviewed FOTO scores for Tika Navarro on 5/20/2024.   FOTO documents entered into [employer] - see Media section.    Functional Status Score: 57            Treatment     Total Treatment time (time-based codes) separate from Evaluation: 0 minutes     No treatment provided today due to time spent performing patient interview, objective assessments and patient education.     Patient Education and Home Exercises     Education provided:   - PT Plan of Care and Goals   - Interpretation of outcome measures performed today    Written Home Exercises Provided: Provide next visit.    Assessment     Tika DECKER is a 58 y.o. female referred to outpatient Physical Therapy with a medical diagnosis of G20.B2 (ICD-10-CM) - Parkinson's disease with dyskinesia and fluctuating manifestations. Patient presents with signs and symptoms consistent with diagnosis and outlined in "therapy diagnoses" section of " initial evaluation. During today's evaluation, she performed standardized outcome measures to assess standing balance, gait speed, and fall risk including the Timed Up and Go Test, 10-Meter Walk Test, Functional Gait Assessment, and Five Time Sit to Stand Test. She also participated in the MCTSIB and assessments of tandem stance and single leg stance. Scores on these assessment identified an increased risk for falls and in increased reliance on vision for balance.  Although her Timed Up and Go score was WNL, she required increased time when adding cognitive or manual tasks, demonstrating an increased risk of falls when performing dual task activities. The patient also demonstrated significantly decreased range of motion in her right hip which impacts her ability to perform functional activities like tying her shoes. PT and patient discussed possible referral for LSVT [facility] in the future but agreed to focus first plan of care on balance and endurance.     Kusum is a good candidate for outpatient physical therapy to address impairments identified in today's evaluation, increase her capacity for safe community mobility and reduce her risk for further falls.    Upon completion of evaluation, Kusum was provided with education regarding goals and plan of care with which she verbalized understanding and agreement.     Patient prognosis is Good.   Patient will benefit from skilled outpatient Physical Therapy to address the deficits stated above and in the chart below, provide patient /family education, and to maximize patientt's level of independence.     Plan of care discussed with patient: Yes  Patient's spiritual, cultural and educational needs considered and patient is agreeable to the plan of care and goals as stated below:     Anticipated Barriers for therapy: Patient's reported dislike of physical activity    Medical Necessity is demonstrated by the following  History  Co-morbidities and personal factors that may  impact the plan of care [] LOW: no personal factors / co-morbidities  [] MODERATE: 1-2 personal factors / co-morbidities  [x] HIGH: 3+ personal factors / co-morbidities    Moderate / High Support Documentation:   Co-morbidities affecting plan of care:      Anxiety     Hypertension     Mitral valve prolapse syndrome     Parkinson disease     Right tremor type    Right hip pain         Personal Factors:   no deficits     Examination  Body Structures and Functions, activity limitations and participation restrictions that may impact the plan of care [] LOW: addressing 1-2 elements  [] MODERATE: 3+ elements  [x] HIGH: 4+ elements (please support below)    Moderate / High Support Documentation:     Strength  Balance  Sensation  Ambulation   Bed Mobility   Transfers  Coordination      Clinical Presentation [] LOW: stable  [x] MODERATE: Evolving  [] HIGH: Unstable     Decision Making/ Complexity Score: Moderate       Goals:    Short Term Goals: (3 weeks) Date established: Status:    1. Patient will be provided with an individualized home exercise program.  5/20/24 New   2. Patient will demonstrate improved endurance and activity tolerance as evidenced by ability to perform Nu-step x 15 minutes without rests breaks.  5/20/24 New   3. Patient will participate in 6 Minute Walk Test to further assess endurance and efficiency with community level gait and goals will be established accordingly.  5/20/24 New        Long Term Goals: (6 weeks) Date established: Status:    1. Patient will be independent and compliant with updated home exercise program. 5/20/2024   New   2.  Patient will increase her   gait speed as assessed by the Timed 10 Meter Walk Test from 1.0 m/s to 1.18 m/s to increase her safety and independence with gait at a community level. (Minimal Clinically Important Difference or PD = 0.18 m/s)   5/20/2024   New     3. Patient will increase right hip flexion range of motion by 15 deg to improve her ability to tie her  shoes.  5/20/24   New     4. Patient will begin some form of community fitness to begin regular and consistent performance of exercise to continue maintenance of gains made in physical therapy.  5/20/24 New   5. Patient will improve his/her score on the Functional Gait Assessment (FGA) from 21/30 to 25/30, indicating improved safety and (I) with dynamic,comunity level gait. (Minimal Detectable Change for PD= 4 points)  5/20/24 New   6. Patient will improve Five Time Sit to Stand time from 35 seconds to 25 seconds without use of hands to demonstrate improvements in functional strength and mobility.  5/20/24 New   7. Patient will maintain right tandem stance for 30 seconds to demonstrate a reduction in fall risk.  5/20/24 New   8. Patient will maintain single leg stance bilaterally for at least 10 seconds to demonstrate a reduction in fall risk.  5/20/24 New    9. Patient will maintain condition 4 of the MCTSIB for 30 seconds to demonstrate improvements in postural control with vision eliminated.  5/20/24 New        Plan     Plan of care Certification: 5/20/2024 to 7/5/24.    Outpatient Physical Therapy 2 times weekly for 6 weeks to include the following interventions: Gait Training, Manual Therapy, Moist Heat/ Ice, Neuromuscular Re-ed, Patient Education, Self Care, Therapeutic Activities, and Therapeutic Exercise.     Francine Jeffrey PT        Physician's Signature: _________________________________________ Date: ________________

## 2024-05-22 ENCOUNTER — PATIENT MESSAGE (OUTPATIENT)
Dept: NEUROLOGY | Facility: CLINIC | Age: 59
End: 2024-05-22
Payer: MEDICARE

## 2024-05-29 NOTE — PROGRESS NOTES
[facility]   Physical Therapy Treatment Note     Name: Tika Navarro  Clinic Number: [id number]    Therapy Diagnosis:   Encounter Diagnosis   Name Primary?    Impaired functional mobility, balance, gait, and endurance Yes     Physician: Ariella Rao MD    Visit Date: 5/30/2024     Physician Orders: PT Eval and Treat   Medical Diagnosis from Referral: G20.B2 (ICD-10-CM) - Parkinson's disease with dyskinesia and fluctuating manifestations   Evaluation Date: 5/20/2024  Authorization Period Expiration: 5/6/25  Plan of Care Expiration: 7/5/24  Progress Note Due: 6/14/24  Date of Surgery: Deep brain stimulator implantation 10/3/24  Visit # / Visits authorized: 1/ 20   FOTO: 1/ 3     Precautions: Standard and Fall      Time In: 1700  Time Out: 1745  Total Billable Time: 45 minutes    SUBJECTIVE     Pt reports: feels fine today .      She was provided home exercise program 5/30/2024. Patient verbalizes/ demonstrates understanding of home exercise program.   Response to previous treatment: a little sore following evaluation   Functional change: ongoing     Pain: 0/10  Location:  NA     OBJECTIVE     Objective Measures updated at progress report unless specified.     Treatment     Kusum received the treatments listed below:      therapeutic exercises to develop strength, endurance, ROM, flexibility, posture, and core stabilization for 25 minutes including:    10 minutes on SCIFIT seated elliptical for CV endurance and LE strength, level 3.0     Patient performed 3 x 10 reps of the following exercises that are included in the patient's home exercise program. Patient demonstrates and verbalizes understanding of home exercise program.   Sit to stands   Standing Concert Window      neuromuscular re-education activities to improve: Balance, Coordination, Kinesthetic, Sense, Proprioception, and Posture for 20 minutes. The following activities were included:    3 x 30 seconds static standing  with eyes closed, CGA     4 laps tandem ambulation, CGA    10 reps BLE leading step up to foam fitter with opposite leg cone tap, CGA      Time above includes time for rest breaks throughout session     Patient Education and Home Exercises     Home Exercises Provided and Patient Education Provided     Education provided:   - home exercise program, POC, purpose of PT    Written Home Exercises Provided: yes. Exercises were reviewed and Kusum was able to demonstrate them prior to the end of the session.  Kusum demonstrated good  understanding of the education provided. See EMR under Patient Instructions for exercises provided during therapy sessions    ASSESSMENT     Kusum tolerated today's first follow up session very well this afternoon. Session focused primarily on going over home exercise program for LE strength and endurance training. The patient verbalizes/ demonstrates understanding of home exercise program. The remainder of session focuses on static/ dynamic balance training. The patient will benefit from continued physical therapy intervention to address remaining functional mobility deficits.     Kusum Is progressing well towards her goals.   Pt prognosis is Good.     Pt will continue to benefit from skilled outpatient physical therapy to address the deficits listed in the problem list box on initial evaluation, provide pt/family education and to maximize pt's level of independence in the home and community environment.     Pt's spiritual, cultural and educational needs considered and pt agreeable to plan of care and goals.     Anticipated barriers to physical therapy: co-morbidities       Goals:     Short Term Goals: (3 weeks) Date established: Status:    1. Patient will be provided with an individualized home exercise program.  5/20/24 New   2. Patient will demonstrate improved endurance and activity tolerance as evidenced by ability to perform Nu-step x 15 minutes without rests breaks.  5/20/24 New    3. Patient will participate in 6 Minute Walk Test to further assess endurance and efficiency with community level gait and goals will be established accordingly.  5/20/24 New          Long Term Goals: (6 weeks) Date established: Status:    1. Patient will be independent and compliant with updated home exercise program. 5/20/2024    New   2.  Patient will increase her   gait speed as assessed by the Timed 10 Meter Walk Test from 1.0 m/s to 1.18 m/s to increase her safety and independence with gait at a community level. (Minimal Clinically Important Difference or PD = 0.18 m/s)    5/20/2024    New      3. Patient will increase right hip flexion range of motion by 15 deg to improve her ability to tie her shoes.  5/20/24    New      4. Patient will begin some form of community fitness to begin regular and consistent performance of exercise to continue maintenance of gains made in physical therapy.  5/20/24 New   5. Patient will improve his/her score on the Functional Gait Assessment (FGA) from 21/30 to 25/30, indicating improved safety and (I) with dynamic,comunity level gait. (Minimal Detectable Change for PD= 4 points)  5/20/24 New   6. Patient will improve Five Time Sit to Stand time from 35 seconds to 25 seconds without use of hands to demonstrate improvements in functional strength and mobility.  5/20/24 New   7. Patient will maintain right tandem stance for 30 seconds to demonstrate a reduction in fall risk.  5/20/24 New   8. Patient will maintain single leg stance bilaterally for at least 10 seconds to demonstrate a reduction in fall risk.  5/20/24 New    9. Patient will maintain condition 4 of the MCTSIB for 30 seconds to demonstrate improvements in postural control with vision eliminated.  5/20/24 New            PLAN     Continue to work on BLE strength, endurance and balance training as tolerated     eCcelia Monge, PT

## 2024-05-30 ENCOUNTER — CLINICAL SUPPORT (OUTPATIENT)
Dept: REHABILITATION | Facility: HOSPITAL | Age: 59
End: 2024-05-30
Payer: MEDICARE

## 2024-05-30 DIAGNOSIS — Z74.09 IMPAIRED FUNCTIONAL MOBILITY, BALANCE, GAIT, AND ENDURANCE: Primary | ICD-10-CM

## 2024-05-30 PROCEDURE — 97530 THERAPEUTIC ACTIVITIES: CPT | Mod: PO

## 2024-05-30 PROCEDURE — 97110 THERAPEUTIC EXERCISES: CPT | Mod: PO

## 2024-06-12 LAB — FUNGUS BLD CULT: NORMAL

## 2024-06-14 ENCOUNTER — CLINICAL SUPPORT (OUTPATIENT)
Dept: REHABILITATION | Facility: HOSPITAL | Age: 59
End: 2024-06-14
Payer: MEDICARE

## 2024-06-14 DIAGNOSIS — G20.B2 PARKINSON'S DISEASE WITH DYSKINESIA AND FLUCTUATING MANIFESTATIONS: Primary | Chronic | ICD-10-CM

## 2024-06-14 PROCEDURE — 97112 NEUROMUSCULAR REEDUCATION: CPT | Mod: PO,CQ

## 2024-06-14 PROCEDURE — 97110 THERAPEUTIC EXERCISES: CPT | Mod: PO,CQ

## 2024-06-14 NOTE — PROGRESS NOTES
"[facility]   Physical Therapy Treatment Note     Name: Tika Navarro  Clinic Number: [id number]    Therapy Diagnosis:   No diagnosis found.    Physician: Ariella Rao MD    Visit Date: 6/14/2024     Physician Orders: PT Eval and Treat   Medical Diagnosis from Referral: G20.B2 (ICD-10-CM) - Parkinson's disease with dyskinesia and fluctuating manifestations   Evaluation Date: 5/20/2024  Authorization Period Expiration: 5/6/25  Plan of Care Expiration: 7/5/24  Progress Note Due: 6/14/24  Date of Surgery: Deep brain stimulator implantation 10/3/24  Visit # / Visits authorized: 3/ 20   FOTO: 1/ 3     Precautions: Standard and Fall      Time In: 845  Time Out: 930  Total Billable Time: 45 minutes    SUBJECTIVE     Pt reports: " I'm a little tired and still on vacation mood from our trip."       She was on vacation and was not able to do her homework  Response to previous treatment: a little sore following evaluation   Functional change: ongoing     Pain: unable to rate/10  Location:  left leg    OBJECTIVE     Objective Measures updated at progress report unless specified.     Treatment     Kusum received the treatments listed below:      therapeutic exercises to develop strength, endurance, ROM, flexibility, posture, and core stabilization for 20 minutes including:    10 minutes on SCIFIT seated elliptical for CV endurance and LE strength, level 3.0     Leg Press:  3 x 10 reps of squats with #80lbs applied      neuromuscular re-education activities to improve: Balance, Coordination, Kinesthetic, Sense, Proprioception, and Posture for 25 minutes. The following activities were included:    Near ballet bar:  2 x 30 sec each of tandem stance with occ 1 finger support.  2 x 30 sec each of B LE single leg stance with occ 1 finger support  X 6 laps of forward tandem ambulation with occ 1 finger support  X 6 laps of forward marching with 3 sec hold, occ 1 finer support    Airex foam pad: " CGA, occ 1 finger support  2 x 30 sec of static stance with WBOS  2 x 30 sec of static stance with NBOS  2 x 30 sec of static stance with NBOS and eyes closed  2 x 30 sec of static stance with WBOS and eyes closed        Time above includes time for rest breaks throughout session     Patient Education and Home Exercises     Home Exercises Provided and Patient Education Provided     Education provided:   - home exercise program, POC, purpose of PT    Written Home Exercises Provided: yes. Exercises were reviewed and Kusum was able to demonstrate them prior to the end of the session.  Kusum demonstrated good  understanding of the education provided. See EMR under Patient Instructions for exercises provided during therapy sessions    ASSESSMENT     Kusum tolerated her tx session well and did not have any problems noted.  Kusum began leg strengthening on the leg press and balance activities near the ballet bar requiring occasional 1 UE support for safety.  Cont with plan of care.     Kusum Is progressing well towards her goals.   Pt prognosis is Good.     Pt will continue to benefit from skilled outpatient physical therapy to address the deficits listed in the problem list box on initial evaluation, provide pt/family education and to maximize pt's level of independence in the home and community environment.     Pt's spiritual, cultural and educational needs considered and pt agreeable to plan of care and goals.     Anticipated barriers to physical therapy: co-morbidities       Goals:     Short Term Goals: (3 weeks) Date established: Status:    1. Patient will be provided with an individualized home exercise program.  5/20/24 New   2. Patient will demonstrate improved endurance and activity tolerance as evidenced by ability to perform Nu-step x 15 minutes without rests breaks.  5/20/24 New   3. Patient will participate in 6 Minute Walk Test to further assess endurance and efficiency with community level gait and  goals will be established accordingly.  5/20/24 New          Long Term Goals: (6 weeks) Date established: Status:    1. Patient will be independent and compliant with updated home exercise program. 5/20/2024    New   2.  Patient will increase her   gait speed as assessed by the Timed 10 Meter Walk Test from 1.0 m/s to 1.18 m/s to increase her safety and independence with gait at a community level. (Minimal Clinically Important Difference or PD = 0.18 m/s)    5/20/2024    New      3. Patient will increase right hip flexion range of motion by 15 deg to improve her ability to tie her shoes.  5/20/24    New      4. Patient will begin some form of community fitness to begin regular and consistent performance of exercise to continue maintenance of gains made in physical therapy.  5/20/24 New   5. Patient will improve his/her score on the Functional Gait Assessment (FGA) from 21/30 to 25/30, indicating improved safety and (I) with dynamic,comunity level gait. (Minimal Detectable Change for PD= 4 points)  5/20/24 New   6. Patient will improve Five Time Sit to Stand time from 35 seconds to 25 seconds without use of hands to demonstrate improvements in functional strength and mobility.  5/20/24 New   7. Patient will maintain right tandem stance for 30 seconds to demonstrate a reduction in fall risk.  5/20/24 New   8. Patient will maintain single leg stance bilaterally for at least 10 seconds to demonstrate a reduction in fall risk.  5/20/24 New    9. Patient will maintain condition 4 of the MCTSIB for 30 seconds to demonstrate improvements in postural control with vision eliminated.  5/20/24 New            PLAN     Continue to work on BLE strength, endurance and balance training as tolerated     Melva Mendez, PTA

## 2024-06-21 ENCOUNTER — TELEPHONE (OUTPATIENT)
Dept: SPORTS MEDICINE | Facility: CLINIC | Age: 59
End: 2024-06-21
Payer: MEDICARE

## 2024-06-21 ENCOUNTER — OFFICE VISIT (OUTPATIENT)
Dept: URGENT CARE | Facility: CLINIC | Age: 59
End: 2024-06-21
Payer: MEDICARE

## 2024-06-21 VITALS
RESPIRATION RATE: 20 BRPM | WEIGHT: 140 LBS | DIASTOLIC BLOOD PRESSURE: 68 MMHG | HEIGHT: 66 IN | OXYGEN SATURATION: 97 % | SYSTOLIC BLOOD PRESSURE: 124 MMHG | TEMPERATURE: 98 F | HEART RATE: 86 BPM | BODY MASS INDEX: 22.5 KG/M2

## 2024-06-21 DIAGNOSIS — S63.90XA SPRAIN OF HAND, UNSPECIFIED LATERALITY, INITIAL ENCOUNTER: Primary | ICD-10-CM

## 2024-06-21 DIAGNOSIS — M79.641 HAND PAIN, RIGHT: ICD-10-CM
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
## 2024-06-21 DIAGNOSIS — M79.645 FINGER PAIN, LEFT: ICD-10-CM

## 2024-06-21 PROCEDURE — 99213 OFFICE O/P EST LOW 20 MIN: CPT | Mod: S$GLB,,,

## 2024-06-21 PROCEDURE — 73130 X-RAY EXAM OF HAND: CPT | Mod: 50,FY,S$GLB, | Performed by: RADIOLOGY

## 2024-06-21 NOTE — PATIENT INSTRUCTIONS
Rest, elevate, ice as needed  Pain:  Alternate Tylenol and ibuprofen every 4-6 hours as needed  May wear Ace wrap when needed to apply compression and swelling  Follow-up with primary care as needed if symptoms do not improve  Please drink plenty of fluids.  Please get plenty of rest.  Please return here or go to the Emergency Department for any concerns or worsening of condition.  If you were prescribed a narcotic medication, do not drive or operate heavy equipment or machinery while taking these medications.  If you were not prescribed an anti-inflammatory medication, and if you do not have any history of stomach/intestinal ulcers, or kidney disease, or are not taking a blood thinner such as Coumadin, Plavix, Pradaxa, Eloquis, or Xaralta for example, it is OK to take over the counter Ibuprofen or Advil or Motrin or Aleve as directed.  Do not take these medications on an empty stomach.  Rest, ice, compression and elevation to the affected joint or limb as needed.  Please follow up with your primary care doctor or specialist as needed.    If you  smoke, please stop smoking.

## 2024-06-21 NOTE — TELEPHONE ENCOUNTER
Spoke with pt regarding appointment for radiating pain in her leg. Pt states pain radiates from buttocks throughout entire posterior leg and reports numbness/tingling in her foot. The symptoms are exacerbated by standing upright or walking and relieved by bending forward. Pt has hx of scoliosis. Advised pt reschedule to back/spine for possible radiculopathy/stenosis. Attempted to schedule pt at [place] location with no results, department contacted directly. Pt states understanding and appreciation.     Mercy Kwong   Clinical Assistant to Dr. Lala Morley

## 2024-06-21 NOTE — PROGRESS NOTES
"Subjective:      Patient ID: Tika aNvarro is a 58 y.o. female.    Vitals:  height is 5' 6" (1.676 m) and weight is 63.5 kg (140 lb). Her temperature is 97.9 °F (36.6 °C). Her blood pressure is 124/68 and her pulse is 86. Her respiration is 20 and oxygen saturation is 97%.     Chief Complaint: Hand Injury    Pt is a 57 yo female presenting with B/L hand injury. Onset of symptoms was 1 day ago after falling onto her hands after losing balance. Pain is worse with movement and palpation. Pt reports using OTC Ibuprofen and Tylenol.  Denies any known injury to bilateral hands in the past.  Friend is present during exam.     Hand Injury   Her dominant hand is their right hand. The incident occurred 12 to 24 hours ago. The incident occurred at home. The injury mechanism was a fall. The pain is present in the right hand and left hand. The quality of the pain is described as aching. The pain is at a severity of 7/10. The pain is severe. The pain has been Constant since the incident. Pertinent negatives include no chest pain, muscle weakness, numbness or tingling. The symptoms are aggravated by movement and palpation. The treatment provided mild relief.     Constitution: Negative for activity change, appetite change, chills and fever.   HENT:  Negative for ear pain, congestion, postnasal drip, sinus pain, sinus pressure and sore throat.    Neck: Negative for neck pain.   Cardiovascular:  Negative for chest pain and sob on exertion.   Eyes:  Negative for eye trauma, eye discharge, eye itching, eye redness, photophobia and blurred vision.   Respiratory:  Negative for cough, shortness of breath, wheezing and asthma.    Gastrointestinal:  Negative for abdominal pain, nausea, vomiting, constipation and diarrhea.   Genitourinary:  Negative for dysuria, frequency, urgency, urine decreased and hematuria.   Musculoskeletal:  Positive for pain and trauma. Negative for muscle ache.   Skin:  Negative for color change, rash and " hives.   Allergic/Immunologic: Negative for seasonal allergies, asthma, hives and sneezing.   Neurological:  Negative for dizziness, light-headedness, headaches, altered mental status and numbness.   Psychiatric/Behavioral:  Negative for altered mental status and confusion.       Objective:     Physical Exam   Constitutional: She is oriented to person, place, and time. She appears well-developed. She is cooperative.  Non-toxic appearance. She does not appear ill. No distress.      Comments:Pt sitting erect on examination table. No acute respiratory distress, no use of accessory muscles, no notice of nasal flaring.        HENT:   Head: Normocephalic and atraumatic.   Ears:   Right Ear: Hearing, tympanic membrane, external ear and ear canal normal.   Left Ear: Hearing, tympanic membrane, external ear and ear canal normal.   Nose: Nose normal. No mucosal edema, rhinorrhea, nasal deformity or congestion. No epistaxis. Right sinus exhibits no maxillary sinus tenderness and no frontal sinus tenderness. Left sinus exhibits no maxillary sinus tenderness and no frontal sinus tenderness.   Mouth/Throat: Uvula is midline, oropharynx is clear and moist and mucous membranes are normal. Mucous membranes are moist. No trismus in the jaw. Normal dentition. No uvula swelling. No oropharyngeal exudate, posterior oropharyngeal edema or posterior oropharyngeal erythema. Oropharynx is clear.   Eyes: Conjunctivae and lids are normal. Pupils are equal, round, and reactive to light. No scleral icterus. Extraocular movement intact   Neck: Trachea normal and phonation normal. Neck supple. No edema present. No erythema present. No neck rigidity present.   Cardiovascular: Normal rate, regular rhythm, normal heart sounds and normal pulses.   Pulmonary/Chest: Effort normal and breath sounds normal. No accessory muscle usage. No apnea, no tachypnea and no bradypnea. No respiratory distress. She has no decreased breath sounds. She has no rhonchi.    Abdominal: Normal appearance.   Musculoskeletal: Normal range of motion.         General: No deformity. Normal range of motion.      Right hand: She exhibits normal range of motion and normal capillary refill. Normal strength noted.      Left hand: She exhibits normal range of motion and normal capillary refill. Normal strength noted.        Hands:       Comments: Tenderness to palpation of areas  Minimal bruising noted on R 5th digit  ROM intact    Neurological: She is alert and oriented to person, place, and time. She exhibits normal muscle tone. Coordination normal.   Skin: Skin is warm, dry, intact, not diaphoretic and not pale.   Psychiatric: Her speech is normal and behavior is normal. Judgment and thought content normal.   Nursing note and vitals reviewed.  X-Ray Hand 3 View Bilateral    Result Date: 6/21/2024  EXAMINATION: XR HAND COMPLETE 3 VIEWS BILATERAL CLINICAL HISTORY: . Unspecified fall, initial encounter TECHNIQUE: PA, lateral, and oblique views of both hands were performed. COMPARISON: Left hand 06/03/2022 FINDINGS: Six views bilateral hands. There are degenerative changes of the hands.  There is some degree of osteopenia.  No convincing acute displaced fracture or dislocation of the left or right hand.  No radiopaque foreign body.  No significant edema.  The bilateral wrists are intact.     1. No convincing acute displaced fracture or dislocation of the left or right hand. Electronically signed by: Wesley Lacey MD Date:    06/21/2024 Time:    16:26       Assessment:     1. Sprain of hand, unspecified laterality, initial encounter    2. Fall, initial encounter    3. Finger pain, left    4. Hand pain, right        Plan:   I have reviewed the patient chart and pertinent past imaging/labs.      Sprain of hand, unspecified laterality, initial encounter    Fall, initial encounter  -     X-Ray Hand 3 View Bilateral; Future; Expected date: 06/21/2024    Finger pain, left    Hand pain, right

## 2024-06-25 ENCOUNTER — TELEPHONE (OUTPATIENT)
Dept: PAIN MEDICINE | Facility: CLINIC | Age: 59
End: 2024-06-25
Payer: MEDICARE

## 2024-06-25 NOTE — TELEPHONE ENCOUNTER
I called Ms. Navarro and left a  with new patient appointment time/date and office contact information. Thank you.     ----- Message from Mercy Kwong sent at 6/21/2024 10:47 AM CDT -----  Good morning,  This patient was on our schedule for pain radiating from her buttock down posterior leg and numbness in the foot. She has these symptoms when standing upright or walking, and they are relieved by bending forward. She has a hx of scoliosis. Dr. Morley feels she needs to see pain management and not sports med. I attempted to schedule the patient but came up with no results. Can you please reach out to the patient to get scheduled? She prefers the [place] location.     Thanks!  Mercy Kwong   Clinical Assistant to Dr. Lala Morley

## 2024-07-10 ENCOUNTER — CLINICAL SUPPORT (OUTPATIENT)
Dept: REHABILITATION | Facility: HOSPITAL | Age: 59
End: 2024-07-10
Payer: MEDICARE

## 2024-07-10 DIAGNOSIS — Z74.09 IMPAIRED FUNCTIONAL MOBILITY, BALANCE, GAIT, AND ENDURANCE: Primary | ICD-10-CM

## 2024-07-10 PROCEDURE — 97530 THERAPEUTIC ACTIVITIES: CPT | Mod: PO

## 2024-07-10 NOTE — PLAN OF CARE
[facility]  Physical Therapy Plan of Care Note      Name: Tika Navarro  Clinic Number: [id number]    Therapy Diagnosis:   Encounter Diagnosis   Name Primary?    Impaired functional mobility, balance, gait, and endurance Yes     Physician: Ariella Rao MD    Visit Date: 7/10/2024    Physician Orders: Eval and Treat   Medical Diagnosis from Referral: G20.B2 (ICD-10-CM) - Parkinson's disease with dyskinesia and fluctuating manifestations   Evaluation Date: 5/20/2024  Authorization Period Expiration: 12/31/2024   Plan of Care Expiration: 7/5/24  Updated Plan of Care Expiration: 8/23/24   Progress Note Due: 8/5/24   Visit # / Visits authorized: 3/20  FOTO: 2/3    Precautions: Standard and Fall  Functional Level Prior to Evaluation:  Independent     Time In: 10:30 am   Time Out: 11:15 am  Total Time: 45 minutes   Subjective     Update: She feels she has gotten worse. She has fallen twice. First fall was when trying to  a bug from the floor - she reached down, came up quickly and lost her balance. Second fall was when on vacation while walking in the sand. She denies any near falls. She is still limited in right hip flexion range of motion and has difficulty putting her right shoe on.     Pain: 7/10  Location: Lower back     Objective      Update:     therapeutic activities to improve functional performance for 45 minutes, including:    Reassessment of the following:     Strength:       Right Lower Extremity Strength Grade   5/20/24 7/10/24 Left Lower Extremity Strength Grade  5/20/24 7/10/24   Hip Flexion 4+/5 4/5 Hip Flexion 4+/5 4/5   Hip Extension 3+/5 3+/5 Hip Extension 3+/5 3+/5   Knee Flexion 4+/5 4+/5 Knee Flexion 4+/5 4+/5   Knee Extension 4+/5 5/5 Knee Extension 4+/5 5/5   Ankle Dorsiflexion 4+/5 4+/5  Ankle Dorsiflexion 4+/5 4+/5   Hip abduction 4+/5 4+/5 Hip abduction 4+/5 4+/5      Range of Motion:     Left hip flexion PROM: 120 deg   Right hip flexion PROM: 96 deg  "flex     Balance:       Evaluation 5/20/24 7/10/24   Tandem Stance R LE forward, eyes open 12 sec  (<30 sec = Increased FALL RISK) 20 sec    Tandem Stance L LE forward, eyes open 30 sec  (<30 sec = Increased FALL RISK) 30 + sec    Single Limb Stance R LE  eyes open 12 sec  (<10 sec = HIGH FALL RISK) 23 sec   Single Limb Stance L LE  eyes open 6 sec  (<10 sec = HIGH FALL RISK) 8 sec         Postural control: MCTSIB: Evaluation 7/10/24   1. Eyes Open/feet together/Firm: 30 seconds NT   2. Eyes Closed/feet together/Firm:  30  seconds NT   3. Eyes Open/feet together/Foam:  30 seconds NT   4. Eyes Closed/feet together/Foam:  13 seconds - mod sway 30 sec - min to mod sway      APTA Core Set Outcome Measures for Adults with Neurologic Conditions     Evaluation 5/20/24 7/10/24 Reference values    Timed Up and Go  TU sec   TUG Cognitive: 17 sec   TUG Manual: 13 sec    TU sec  score >14 seconds indicates risk for falls in older stroke patients (Juliana et al, 2006)   10 Meter Walk Test  1.0 m/sec (6m/6s)   "Community ambulator" speed category    1.0 m/sec (6m/6s)   "Community ambulator" speed category 0-0.4 m/s = household walker  0.4-0.8 m/s = limited community ambulator   0.8-1.4 m/s = community ambultor  >1.4 m/s = can cross street safely    Functional Gait Assessment   < = identifies fall risk in community dwelling older adults   (MCID = 4)   Villaseñor Balance Test Not Assessed    Not Assessed.  Fall risk cut-off for stroke= 45/56   6 Minute Walk Test   Not Assessed - Assess Next Visit Not Assessed - Assess in future visit.  6 Minute Walk Test Distances: Means by Age and Gender     Age Gender Mean   60-69 Female  Male 572 meters (1876 feet)  538 meters (1765 feet)   70-79 Female  Male 527 meters (1729 feet)  471 meters (1545 feet)   80-89 Female  Male 417 meters (1368 feet)  392 meters ( 1286 feet)    JENNI Denney (2000)       5 times sit-stand    35 seconds     2 without hands, 3 with hands 25 " seconds     2 with hands, 3 without hands  >12 sec= fall risk for general elderly  >10 sec= balance/vestibular dysfunction (<61 y/o)  >14.2 sec= balance/vestibular dysfunction (>61 y/o)  >12 sec= fall risk for stroke     Functional Gait Assessment:   1. Gait on level surface =  2 (6 sec)    (3) Normal: less than 5.5 sec, no A.D., no imbalance, normal gait pattern, deviates< 6in   (2) Mild impairment: 7-5.6 sec, uses A.D., mild gait deviations, or deviates 6-10 in   (1) Moderate impairment: > 7 sec, slow speed, imbalance, deviates 10-15 in.   (0) Severe impairment: needs assist, deviates >15 in, reach/touch wall  2. Change in Gait Speed = 2   (3) Normal: smooth change w/o loss of balance or gait deviation, deviates < 6 in, significant difference between speeds   (2) Mild impairment: changes speed, but demonstrates mild gait deviations, deviates 6-10 in, OR no deviations but unable to significantly speed, OR uses A.D.   (1) Moderate impairment: minor changes to speed, OR changes speed w/ significant deviations, deviates 10-15 in, OR  Changes speed , but loses balance & recovers   (0) Severe impairment: cannot change speed, deviates >15 in, or loses balance & needs assist  3. Gait with horizontal head turns  = 3   (3) Normal: no change in gait, deviates <6 in   (2) Mild impairment: slight change in speed, deviates 6-10 in, OR uses A.D.   (1) Moderate impairment: moderate change in speed, deviates 10-15 in   (0) Severe impairment: severe disruption of gait, deviates >15in  4. Gait with vertical head turns = 2   (3) Normal: no change in gait, deviates <6 in   (2) Mild impairment: slight change in speed, deviates 6-10 in OR uses A.D.   (1) Moderate impairment: moderate change in speed, deviates 10-15 in   (0) Severe impairment: severe disruption of gait, deviates >15 in  5. Gait with pivot turns = 1 (freeze 3/4 way through turn)   (3) Normal: performs safely in 3 sec, no LOB   (2) Mild impairment: performs in >3 sec & no  LOB, OR turns safely & requires several steps to regain LOB   (1) Moderate impairment: turns slow, OR requires several small steps for balance following turn & stop   (0) Severe impairment: cannot turn safely, needs assist  6. Step over obstacle = 3   (3) Normal: steps over 2 stacked boxes w/o change in speed or LOB   (2) Mild impairment: able to step over 1 box w/o change in speed or LOB   (1) Moderate impairment: steps over 1 box but must slow down, may require VC   (0) Severe impairment: cannot perform w/o assist  7. Gait with Narrow ROSELIA = 2   (3) Normal: 10 steps no staggering   (2) Mild impairment: 7-9 steps   (1) Moderate impairment: 4-7 steps   (0) Severe impairment: < 4 steps or cannot perform w/o assist  8. Gait with eyes closed = 2 (8 sec)    (3) Normal: < 7 sec, no A.D., no LOB, normal gait pattern, deviates <6 in   (2) Mild impairment: 7.1-9 sec, mild gait deviations, deviates 6-10 in   (1) Moderate impairment: > 9 sec, abnormal pattern, LOB, deviates 10-15 in   (0) Severe impairment: cannot perform w/o assist, LOB, deviates >15in  9. Ambulating Backwards = 2   (3) Normal: no A.D., no LOB, normal gait pattern, deviates <6in   (2) Mild impairment: uses A.D., slower speed, mild gait deviations, deviates 6-10 in   (1) Moderate impairment: slow speed, abnormal gait pattern, LOB, deviates 10-15 in   (0) Severe impairment: severe gait deviations or LOB, deviates >15in  10. Steps = 2   (3) Normal: alternating feet, no rail   (2) Mild Impairment: alternating feet, uses rail   (1) Moderate impairment: step-to, uses rail   (0) Severe impairment: cannot perform safely    Score 21/30     Score:   <22/30 fall risk   <20/30 fall risk in older adults   <18/30 fall risk in Parkinsons     FOTO CNS Disorders Survey:       Therapist reviewed FOTO scores for Tika Navarro on 7/10/2024.   FOTO documents entered into [employer] - see Media section.    Functional Status Score: 38            Assessment     Kusum presented for  "reassessment today and demonstrated improvements bilateral tandem stance, single leg stance, condition 4 of the mCTSIB, and Five Time Sit to Stand. All additional assessments remained the same as initial evaluation. She met 1/3 short-term and 2/9 long-term goals. Kusum was only able to attend two visits during the previous plan of care due to travel and illness, however she is eager to participate in upcoming plan of care. She remains at an increased risk of falls and demonstrated difficulty performing sit <> stands without using hands. She has weakness in bilateral hip flexors and hip extensors and significant limitations in right hip flexion range of motion. She is a "community ambulator" based on her gait speed. She agreed to transition into the LSVT [employer] program beginning the week of 7/22/24.     She is an appropriate candidate for treatment with the LSVT BIG protocol. This is an evidence-based treatment protocol shown to improve gait speed, step length, bed mobility, balance and function with ADLs in people with Parkinson's Disease. This is delivered 4x/week for 4 weeks by a Certified LSVT [employer] therapist with a focus on increasing amplitude of movements so that the patient cane effectively override the bradykinesia and hypokinesia symptoms of Parkinson's Disease.         PREVIOUS GOALS  Short Term Goals: (3 weeks) Date established: Status:    1. Patient will be provided with an individualized home exercise program.  5/20/24 MET   2. Patient will demonstrate improved endurance and activity tolerance as evidenced by ability to perform Nu-step x 15 minutes without rests breaks.  5/20/24 Ongoing   3. Patient will participate in 6 Minute Walk Test to further assess endurance and efficiency with community level gait and goals will be established accordingly.  5/20/24 Ongoing         Long Term Goals: (6 weeks) Date established: Status:    1. Patient will be independent and compliant with updated home exercise program. " 5/20/2024    Ongoing   2.  Patient will increase her   gait speed as assessed by the Timed 10 Meter Walk Test from 1.0 m/s to 1.18 m/s to increase her safety and independence with gait at a community level. (Minimal Clinically Important Difference or PD = 0.18 m/s)    5/20/2024    Ongoing      3. Patient will increase right hip flexion range of motion by 15 deg to improve her ability to tie her shoes.  5/20/24    Progressing   4. Patient will begin some form of community fitness to begin regular and consistent performance of exercise to continue maintenance of gains made in physical therapy.  5/20/24 Ongoing   5. Patient will improve his/her score on the Functional Gait Assessment (FGA) from 21/30 to 25/30, indicating improved safety and (I) with dynamic,comunity level gait. (Minimal Detectable Change for PD= 4 points)  5/20/24 Ongoing   6. Patient will improve Five Time Sit to Stand time from 35 seconds to 25 seconds without use of hands to demonstrate improvements in functional strength and mobility.  5/20/24 Nearly Met - requires hands on 2 repetitions   7. Patient will maintain right tandem stance for 30 seconds to demonstrate a reduction in fall risk.  5/20/24 Progressing   8. Patient will maintain single leg stance bilaterally for at least 10 seconds to demonstrate a reduction in fall risk.  5/20/24 Progressing   9. Patient will maintain condition 4 of the MCTSIB for 30 seconds to demonstrate improvements in postural control with vision eliminated.  5/20/24 MET      UPDATED GOALS:    Short Term Goals: (2 weeks) Date established: Status:    1. Patient will be provided with education and instruction re: LSVT exercises to be performed as part of home exercise program.  7/10/2024   New   2.  Patient will demonstrate improve endurance and activity tolerance as evidenced by ability to perform Nu-step x 15 minutes without rests breaks with heart rate post-activity equivalent to 50-80% of maximum heart rate.   7/10/2024    New   3. Patient will participate in 6 Minute Walk Test to further assess endurance and efficiency with community level gait and goals will be established accordingly.  7/10/2024    New        Long Term Goals: (4 weeks) Date established: Status:    1. Patient will be independent and compliant with LSVT exercises to be performed 2x/day per LSVT BIG protocol.  7/10/2024   New   2.  Patient will increase her gait speed as assessed by the timed 10 Meter Walk Test from 1.0 m/s to 1.18 m/s to increase her safety and (I) with gait at a community level. (Minimal detectable change for Parkinson's Disease= 0.18 m/s)     7/10/2024   New      3. Patient will increase bilateral hip flexor strength as assessed by manual muscle test by 1/2 grade as appropriate to improve functional mobility.  7/10/2024   New      4. Patient will increase bilateral hip extensor strength as assessed by manual muscle test by 1/2 grade as appropriate to improve functional mobility.  7/10/2024   New   5. Patient will begin some form of community fitness to begin regular and consistent performance of exercise to continue maintenance of gains made in physical therapy. 7/10/2024    New   6. Patient will improve her score on the 5 Times Sit to Stand test from 25 to seconds to </= 20 seconds without use of hands to demonstrate improved independence and efficiency with functional mobility. (Fall risk cut-off= >16)  7/10/2024    New    7. Patient will increase right hip flexion range of motion from 96 deg to 110 deg to improve her ability to tie her shoes.  7/10/2024   New   8. Patient will maintain right tandem stance for 30 seconds to demonstrate a reduction in fall risk.  7/10/2024   New    9. Patient will maintain left single leg stance for at least 10 seconds to demonstrate a reduction in fall risk.  7/10/2024   New   10. Patient will improve his/her score on the Functional Gait Assessment (FGA) from 21/30 to 25/30, indicating improved  safety and (I) with dynamic,comunity level gait. (Minimal Detectable Change for PD= 4 points)  7/10/2024   New             Plan     Updated Certification Period: 7/10/24 to 8/23/34 (Dates extended to allow for scheduling LSVT BIG)   Recommended Treatment Plan: 4 times per week for 4 weeks:  Gait Training, Manual Therapy, Moist Heat/ Ice, Neuromuscular Re-ed, Patient Education, Therapeutic Activities, Therapeutic Exercise, and LSVT BIG    Francine Jeffrey, PT

## 2024-07-12 ENCOUNTER — CLINICAL SUPPORT (OUTPATIENT)
Dept: REHABILITATION | Facility: HOSPITAL | Age: 59
End: 2024-07-12
Payer: MEDICARE

## 2024-07-12 DIAGNOSIS — G20.B2 PARKINSON'S DISEASE WITH DYSKINESIA AND FLUCTUATING MANIFESTATIONS: Primary | Chronic | ICD-10-CM

## 2024-07-12 NOTE — PROGRESS NOTES
"[facility] OUTPATIENT THERAPY AND WELLNESS   Physical Therapy Treatment Note     Name: Tika Navarro  Clinic Number: [id number]    Therapy Diagnosis:   Encounter Diagnosis   Name Primary?    Parkinson's disease with dyskinesia and fluctuating manifestations Yes       Physician: Ariella Rao MD    Visit Date: 7/12/2024     Physician Orders: PT Eval and Treat   Medical Diagnosis from Referral: G20.B2 (ICD-10-CM) - Parkinson's disease with dyskinesia and fluctuating manifestations   Evaluation Date: 5/20/2024  Authorization Period Expiration: 5/6/25  Plan of Care Expiration: 7/5/24  Progress Note Due: 6/14/24  Date of Surgery: Deep brain stimulator implantation 10/3/24  Visit # / Visits authorized: 4/ 20   FOTO: 1/ 3     Precautions: Standard and Fall      Time In: 845  Time Out: 930  Total Billable Time: 45 minutes    SUBJECTIVE     Pt reports: " I'm doing ok, I"m going to be starting LSVT the week of the 22nd, I'm excited."      She was semi - compliant with HEP  Response to previous treatment: No problems  Functional change: ongoing     Pain: unable to rate/10  Location:  left leg    OBJECTIVE     Objective Measures updated at progress report unless specified.       Treatment     Kusum received the treatments listed below:      therapeutic exercises to develop strength, endurance, ROM, flexibility, posture, and core stabilization for 25 minutes including:    10 minutes on SCIFIT seated elliptical for CV endurance and LE strength, level 3.0     Leg Press:  3 x 10 reps of squats with #90lbs applied  3 x 10 reps of SL squats with #60lbs applied.     neuromuscular re-education activities to improve: Balance, Coordination, Kinesthetic, Sense, Proprioception, and Posture for 20  minutes. The following activities were included:    In // bars:   2 [employer] balance beams in front of each other:   X 6 laps of forward tandem ambulation with occ 1 UE support  2 x 30 sec of tandem stance with occ 1 finger support  2 x 30 sec " of B LE single stance with occ 1 finger support    therapeutic activities to improve functional performance for 0  minutes, including:        Time above includes time for rest breaks throughout session     Patient Education and Home Exercises     Home Exercises Provided and Patient Education Provided     Education provided:   - home exercise program, POC, purpose of PT    Written Home Exercises Provided: yes. Exercises were reviewed and Kusum was able to demonstrate them prior to the end of the session.  Kusum demonstrated good  understanding of the education provided. See EMR under Patient Instructions for exercises provided during therapy sessions    ASSESSMENT   Kusum tolerated her tx session well today with no complaints or pain or problems.  Kusum was able to increase her weight on the leg press and began single leg squats.  Kusum progressed her balance activities to the [employer] balance beam and required 1 UE support to no UE support for safety.  Cont with plan of care.     Kusum Is progressing well towards her goals.   Pt prognosis is Good.     Pt will continue to benefit from skilled outpatient physical therapy to address the deficits listed in the problem list box on initial evaluation, provide pt/family education and to maximize pt's level of independence in the home and community environment.     Pt's spiritual, cultural and educational needs considered and pt agreeable to plan of care and goals.     Anticipated barriers to physical therapy: co-morbidities       Goals:     Short Term Goals: (3 weeks) Date established: Status:    1. Patient will be provided with an individualized home exercise program.  5/20/24 MET   2. Patient will demonstrate improved endurance and activity tolerance as evidenced by ability to perform Nu-step x 15 minutes without rests breaks.  5/20/24 New    3. Patient will participate in 6 Minute Walk Test to further assess endurance and efficiency with community level gait and  goals will be established accordingly.  5/20/24 New          Long Term Goals: (6 weeks) Date established: Status:    1. Patient will be independent and compliant with updated home exercise program. 5/20/2024    New   2.  Patient will increase her   gait speed as assessed by the Timed 10 Meter Walk Test from 1.0 m/s to 1.18 m/s to increase her safety and independence with gait at a community level. (Minimal Clinically Important Difference or PD = 0.18 m/s)    5/20/2024    New      3. Patient will increase right hip flexion range of motion by 15 deg to improve her ability to tie her shoes.  5/20/24    New      4. Patient will begin some form of community fitness to begin regular and consistent performance of exercise to continue maintenance of gains made in physical therapy.  5/20/24 New   5. Patient will improve his/her score on the Functional Gait Assessment (FGA) from 21/30 to 25/30, indicating improved safety and (I) with dynamic,comunity level gait. (Minimal Detectable Change for PD= 4 points)  5/20/24 New   6. Patient will improve Five Time Sit to Stand time from 35 seconds to 25 seconds without use of hands to demonstrate improvements in functional strength and mobility.  5/20/24 New   7. Patient will maintain right tandem stance for 30 seconds to demonstrate a reduction in fall risk.  5/20/24 New   8. Patient will maintain single leg stance bilaterally for at least 10 seconds to demonstrate a reduction in fall risk.  5/20/24 New    9. Patient will maintain condition 4 of the MCTSIB for 30 seconds to demonstrate improvements in postural control with vision eliminated.  5/20/24 New            PLAN     Continue to work on BLE strength, endurance and balance training as tolerated     Melva Mendez, PTA

## 2024-07-16 ENCOUNTER — CLINICAL SUPPORT (OUTPATIENT)
Dept: REHABILITATION | Facility: HOSPITAL | Age: 59
End: 2024-07-16
Payer: MEDICARE

## 2024-07-16 DIAGNOSIS — Z74.09 IMPAIRED FUNCTIONAL MOBILITY, BALANCE, GAIT, AND ENDURANCE: Primary | ICD-10-CM

## 2024-07-16 PROCEDURE — 97530 THERAPEUTIC ACTIVITIES: CPT | Mod: PO

## 2024-07-16 NOTE — PROGRESS NOTES
"[facility]   Physical Therapy Treatment Note      Name: Tika Navarro  Clinic Number: [id number]    Therapy Diagnosis:   Encounter Diagnosis   Name Primary?    Impaired functional mobility, balance, gait, and endurance Yes     Physician: Ariella Rao MD    Visit Date: 7/16/2024    Physician Orders: Eval and Treat   Medical Diagnosis from Referral: G20.B2 (ICD-10-CM) - Parkinson's disease with dyskinesia and fluctuating manifestations   Evaluation Date: 5/20/2024  Authorization Period Expiration: 12/31/2024   Plan of Care Expiration: 7/5/24 (LSVT BIG begins 7/22/24)   Updated Plan of Care Expiration: 8/23/24   Progress Note Due: 8/5/24   Visit # / Visits authorized: 5/20  FOTO: 2/3     Precautions: Standard and Fall     Time In: 8:45 am   Time Out: 9:25 am  Total Time: 40 minutes      PTA Visit #: 0/5       Subjective     Pt reports: "I come here for the accountability."    She was not compliant with home exercise program.    Response to previous treatment: None stated  Functional change: Ongoing    Pain: 0/10  Location: NA     Objective      Objective Measures updated at progress report unless specified.     6 Minute Walk Test   1120 ft     Kenji RPE 13/20 - somewhat hard 6 Minute Walk Test Distances: Means by Age and Gender    Age Gender Mean   60-69 Female  Male 572 meters (1876 feet)  538 meters (1765 feet)   70-79 Female  Male 527 meters (1729 feet)  471 meters (1545 feet)   80-89 Female  Male 417 meters (1368 feet)  392 meters ( 1286 feet)    JENNI Denney (2002)            Treatment     Kusum received the treatments listed below:      therapeutic exercises to develop ROM and flexibility for 2 minutes including:    - 3 x 30" right PROM hip flexion stretch     therapeutic activities to improve functional performance for 38 minutes, including:    - 2x10 sit <> stand from elevated mat with big arm reach     In parallel bars:   - Single leg stance   Left: 16 sec, 16 sec, 13 sec " "- Mod sway    Right: 4 sec, 16 sec, 20 sec, 8 sec - Max sway   - 2x60" practicing quarter and half turns to three colored targets  - 1 x 60" practicing quarter, half and full turns to four colored targets (increased right hand tremors with added complexity)   - 3 laps tandem walking   - 3 laps side stepping   - 3 laps backward walking     Patient Education and Home Exercises       Education provided:   - Demonstration and instruction on all activities included in today's session    Written Home Exercises Provided: Provided in previous visit. Exercises were reviewed and Kusum was able to demonstrate them prior to the end of the session.  Kusum demonstrated good  understanding of the education provided. See EMR under Patient Instructions for exercises provided during therapy sessions    Assessment     Kusum tolerated session well today. She has decreased endurance and efficiency with community level mobility based on her 6 Minute Walk Test distance which is below age- and gender- matched norms. Kusum reported working somewhat hard throughout the test. She demonstrated slight improvements in single leg stance today but continues to have mod to max sway. She had difficulty when initiating tasks and had an increase in hand tremors with added complexity of interventions. Her foot clearance during turns improved with frequent verbal cues and demonstration. She was apprehensive with backward walking and reported increase in right hip pain after side steps. Overall, she is progressing well with therapy. She is appropriate for LSVT BIG protocol which will begin next week.       Kusum Is progressing well towards her goals.   Pt prognosis is Good.     Pt will continue to benefit from skilled outpatient physical therapy to address the deficits listed in the problem list box on initial evaluation, provide pt/family education and to maximize pt's level of independence in the home and community environment.     Pt's spiritual, " cultural and educational needs considered and pt agreeable to plan of care and goals.     Anticipated barriers to physical therapy: Patient's reported dislike of physical activity    Goals:     UPDATED GOALS:     Short Term Goals: (2 weeks) Date established: Status:    1. Patient will be provided with education and instruction re: LSVT exercises to be performed as part of home exercise program.  7/10/2024    Ongoing   2.  Patient will demonstrate improve endurance and activity tolerance as evidenced by ability to perform Nu-step x 15 minutes without rests breaks with heart rate post-activity equivalent to 50-80% of maximum heart rate.  7/10/2024    Ongoing   3. Patient will participate in 6 Minute Walk Test to further assess endurance and efficiency with community level gait and goals will be established accordingly.  7/10/2024    MET         Long Term Goals: (4 weeks) Date established: Status:    1. Patient will be independent and compliant with LSVT exercises to be performed 2x/day per LSVT BIG protocol.  7/10/2024    Ongoing   2.  Patient will increase her gait speed as assessed by the timed 10 Meter Walk Test from 1.0 m/s to 1.18 m/s to increase her safety and (I) with gait at a community level. (Minimal detectable change for Parkinson's Disease= 0.18 m/s)     7/10/2024    Ongoing      3. Patient will increase bilateral hip flexor strength as assessed by manual muscle test by 1/2 grade as appropriate to improve functional mobility.  7/10/2024    Ongoing   4. Patient will increase bilateral hip extensor strength as assessed by manual muscle test by 1/2 grade as appropriate to improve functional mobility.  7/10/2024    Ongoing   5. Patient will begin some form of community fitness to begin regular and consistent performance of exercise to continue maintenance of gains made in physical therapy. 7/10/2024    Ongoing   6. Patient will improve her score on the 5 Times Sit to Stand test from 25 to seconds to </= 20  seconds without use of hands to demonstrate improved independence and efficiency with functional mobility. (Fall risk cut-off= >16)  7/10/2024    Ongoing   7. Patient will increase right hip flexion range of motion from 96 deg to 110 deg to improve her ability to tie her shoes.  7/10/2024    Ongoing   8. Patient will maintain right tandem stance for 30 seconds to demonstrate a reduction in fall risk.  7/10/2024    Ongoing   9. Patient will maintain left single leg stance for at least 10 seconds to demonstrate a reduction in fall risk.  7/10/2024    Ongoing   10. Patient will improve his/her score on the Functional Gait Assessment (FGA) from 21/30 to 25/30, indicating improved safety and (I) with dynamic,comunity level gait. (Minimal Detectable Change for PD= 4 points)  7/10/2024    Ongoing   11. Patient will improve 6 Minute Walk Test distance to at least 1300 feet to demonstrate improvements in endurance and efficiency with community- level gait.  7/16/2024   Ongoing       Plan     Updated Certification Period: 7/10/24 to 8/23/34 (Dates extended to allow for scheduling LSVT BIG)   Recommended Treatment Plan: 4 times per week for 4 weeks:  Gait Training, Manual Therapy, Moist Heat/ Ice, Neuromuscular Re-ed, Patient Education, Therapeutic Activities, Therapeutic Exercise, and LSVT BIG    Francine Jeffrey, PT

## 2024-07-18 ENCOUNTER — CLINICAL SUPPORT (OUTPATIENT)
Dept: REHABILITATION | Facility: HOSPITAL | Age: 59
End: 2024-07-18
Payer: MEDICARE

## 2024-07-18 ENCOUNTER — DOCUMENTATION ONLY (OUTPATIENT)
Dept: REHABILITATION | Facility: HOSPITAL | Age: 59
End: 2024-07-18

## 2024-07-18 DIAGNOSIS — G20.B2 PARKINSON'S DISEASE WITH DYSKINESIA AND FLUCTUATING MANIFESTATIONS: Primary | Chronic | ICD-10-CM

## 2024-07-18 PROCEDURE — 97110 THERAPEUTIC EXERCISES: CPT | Mod: PO,CQ

## 2024-07-18 PROCEDURE — 97112 NEUROMUSCULAR REEDUCATION: CPT | Mod: PO,CQ

## 2024-07-18 NOTE — PROGRESS NOTES
PT/PTA met face to face to discuss pt's treatment plan and progress towards established goals.  Continue with current PT POC with focus on LSVT protocol.  Patient will be seen by physical therapist at least every sixth treatment or 30 days, whichever occurs first.    Melva Mendez, PTA  07/18/2024

## 2024-07-18 NOTE — PROGRESS NOTES
"[facility]   Physical Therapy Treatment Note      Name: Tika Navarro  Clinic Number: [id number]    Therapy Diagnosis:   Encounter Diagnosis   Name Primary?    Parkinson's disease with dyskinesia and fluctuating manifestations Yes       Physician: Ariella Rao MD    Visit Date: 7/18/2024    Physician Orders: Eval and Treat   Medical Diagnosis from Referral: G20.B2 (ICD-10-CM) - Parkinson's disease with dyskinesia and fluctuating manifestations   Evaluation Date: 5/20/2024  Authorization Period Expiration: 12/31/2024   Plan of Care Expiration: 7/5/24 (LSVT BIG begins 7/22/24)   Updated Plan of Care Expiration: 8/23/24   Progress Note Due: 8/5/24   Visit # / Visits authorized: 6/20  FOTO: 2/3     Precautions: Standard and Fall     Time In: 9:30 am   Time Out: 10:15 am  Total Time: 45 minutes      PTA Visit #: 1/5       Subjective     Pt reports: "That walking was a lot last time."    She was not compliant with home exercise program.    Response to previous treatment: None stated  Functional change: Ongoing    Pain: 0/10  Location: NA     Objective      Objective Measures updated at progress report unless specified.       Treatment     Kusum received the treatments listed below:      therapeutic exercises to develop ROM and flexibility for 25 minutes including:    X 2 min on Recumbent bike on level 1.0  X 10 min on Sci Fit recumbent stepper.  B UE/B LE on level 4.0, multi peak    - 3 x 30" right PROM hip flexion stretch ( SKTC)    Patient participated in neuromuscular re-education activities to improve: Balance, Coordination, Kinesthetic, Sense, and Proprioception for 20 minutes. The following activities were included:   [place] bar: CGA  [place]:   X 30 sec of static standing with eyes opened no UE support  2 x 30 sec of static standing with eyes closed and no UE support  X60 sec of BLE marching in place with 1 UE support  X 60 sec of weight shifting medial/lateral with 1 UE " support  X 60 sec of weight shifting anterior/posterior weight shifting with 1 UE support.      Flat ground:  3 x 30 sec of B LE single leg stance, 1 UE support occasionally      therapeutic activities to improve functional performance for 0 minutes, including:          Patient Education and Home Exercises       Education provided:   - Demonstration and instruction on all activities included in today's session    Written Home Exercises Provided: Provided in previous visit. Exercises were reviewed and Kusum was able to demonstrate them prior to the end of the session.  Kusum demonstrated good  understanding of the education provided. See EMR under Patient Instructions for exercises provided during therapy sessions    Assessment     Kusum tolerated her tx session well and cont to focus on endurance and balance before beginning LSVT protocol next weekend.  Kusum was able to increase her resistance on the stepper and began hills.  Kusum required occasional 1 UE support to no UE support for safety.  Cont with plan of care.       Kusum Is progressing well towards her goals.   Pt prognosis is Good.     Pt will continue to benefit from skilled outpatient physical therapy to address the deficits listed in the problem list box on initial evaluation, provide pt/family education and to maximize pt's level of independence in the home and community environment.     Pt's spiritual, cultural and educational needs considered and pt agreeable to plan of care and goals.     Anticipated barriers to physical therapy: Patient's reported dislike of physical activity    Goals:     UPDATED GOALS:     Short Term Goals: (2 weeks) Date established: Status:    1. Patient will be provided with education and instruction re: LSVT exercises to be performed as part of home exercise program.  7/10/2024    Ongoing   2.  Patient will demonstrate improve endurance and activity tolerance as evidenced by ability to perform Nu-step x 15 minutes  without rests breaks with heart rate post-activity equivalent to 50-80% of maximum heart rate.  7/10/2024    Ongoing   3. Patient will participate in 6 Minute Walk Test to further assess endurance and efficiency with community level gait and goals will be established accordingly.  7/10/2024    MET         Long Term Goals: (4 weeks) Date established: Status:    1. Patient will be independent and compliant with LSVT exercises to be performed 2x/day per LSVT BIG protocol.  7/10/2024    Ongoing   2.  Patient will increase her gait speed as assessed by the timed 10 Meter Walk Test from 1.0 m/s to 1.18 m/s to increase her safety and (I) with gait at a community level. (Minimal detectable change for Parkinson's Disease= 0.18 m/s)     7/10/2024    Ongoing      3. Patient will increase bilateral hip flexor strength as assessed by manual muscle test by 1/2 grade as appropriate to improve functional mobility.  7/10/2024    Ongoing   4. Patient will increase bilateral hip extensor strength as assessed by manual muscle test by 1/2 grade as appropriate to improve functional mobility.  7/10/2024    Ongoing   5. Patient will begin some form of community fitness to begin regular and consistent performance of exercise to continue maintenance of gains made in physical therapy. 7/10/2024    Ongoing   6. Patient will improve her score on the 5 Times Sit to Stand test from 25 to seconds to </= 20 seconds without use of hands to demonstrate improved independence and efficiency with functional mobility. (Fall risk cut-off= >16)  7/10/2024    Ongoing   7. Patient will increase right hip flexion range of motion from 96 deg to 110 deg to improve her ability to tie her shoes.  7/10/2024    Ongoing   8. Patient will maintain right tandem stance for 30 seconds to demonstrate a reduction in fall risk.  7/10/2024    Ongoing   9. Patient will maintain left single leg stance for at least 10 seconds to demonstrate a reduction in fall risk.   7/10/2024    Ongoing   10. Patient will improve his/her score on the Functional Gait Assessment (FGA) from 21/30 to 25/30, indicating improved safety and (I) with dynamic,comunity level gait. (Minimal Detectable Change for PD= 4 points)  7/10/2024    Ongoing   11. Patient will improve 6 Minute Walk Test distance to at least 1300 feet to demonstrate improvements in endurance and efficiency with community- level gait.  7/18/2024   Ongoing       Plan     Updated Certification Period: 7/10/24 to 8/23/34 (Dates extended to allow for scheduling LSVT BIG)   Recommended Treatment Plan: 4 times per week for 4 weeks:  Gait Training, Manual Therapy, Moist Heat/ Ice, Neuromuscular Re-ed, Patient Education, Therapeutic Activities, Therapeutic Exercise, and LSVT BIG    Melva Mendez, PTA

## 2024-07-18 NOTE — TELEPHONE ENCOUNTER
Patient assessed at bedside, placenta still not delivered  Bleeding well controlled however given that it has been 3+ hours since delivery without delivery of placenta, recommend proceeding to OR for suction D&C  Reviewed risks of bleeding, infection and injury to surrounding structures  All questions answered        Ivon Cunningham MD     Spoke with pt, informed her that labs would be done at her anesthesia pre-op appt. Pt said that Dr. Conti stated that he would not be able to place labs needed for pre-op and that labs would have to be ordered by you

## 2024-07-19 NOTE — PROGRESS NOTES
"[facility]   Physical Therapy Treatment Note      Name: Tika DECKER [facility]  Clinic Number: [id number]    Therapy Diagnosis:   Encounter Diagnosis   Name Primary?    Impaired functional mobility, balance, gait, and endurance Yes         Physician: Ariella Rao MD    Visit Date: 7/22/2024    Physician Orders: Eval and Treat   Medical Diagnosis from Referral: G20.B2 (ICD-10-CM) - Parkinson's disease with dyskinesia and fluctuating manifestations   Evaluation Date: 5/20/2024  Authorization Period Expiration: 12/31/2024   Plan of Care Expiration: 7/5/24 (LSVT BIG begins 7/22/24)   Updated Plan of Care Expiration: 8/23/24   Progress Note Due: 8/5/24   Visit # / Visits authorized: 7/20  FOTO: 2/3     Precautions: Standard and Fall     Time In: 0845   Time Out: 0930  Total Time: 45 minutes      PTA Visit #: 0/5       Subjective     Pt reports: "feeling pretty good this morning."    She was not compliant with home exercise program.    Response to previous treatment: None stated  Functional change: Ongoing    Pain: 0/10  Location: NA     Objective      Objective Measures updated at progress report unless specified.     Treatment     Kusum received the treatments listed below    LSVT Protocol    Week:  (Session:) 1/4  (1/16)       Tika DECKER received individual therapy including neuro re-education for 30 minutes including:    LSVT Maximal Daily Exercises to promote amplitude: (10 repetitions each)   Daily Exercises   Performance  Comments/Modifications   1  Floor <> Ceiling  fair BIG Arms/Hands   VC to open hands    2  Side <> Side  fair BIG Arms/Hands   VC to open hands    3  Step & Reach  (forward)   fair with no upper extremity support    VC to coordinate UE/LE movements & weight shifts    4  Step & Reach (sideways)   fair with no upper extremity support   VC to emphasize rotation of hips    5  Step & Reach  (backwards)   fair with no upper extremity support    VC to coordinate UE/LE " movements & weight shifts    6  Rock & Reach  (foward)   fair with no upper extremity support    VC for weight shifts    7  Rock & Reach   (sideways twist)   fair with no upper extremity support    VC for weight shifts       Patient participated in dynamic functional therapeutic activities to improve functional performance for 10 minutes. Including:     Functional Component Tasks:   Sit to stands: 10 reps from EOM (Black) with big arm swings     Hierarchy Task - NP 07/22/2024     5 minute discussion about LSVT protocol, frequency to perform, & how to modify exercises with a chair if needed for extra stability.    Patient participated in gait training activities to normalize gait pattern for 05 minutes. The following activities were included:    BIG Walking   Gait belt used for safety.   Assistive device: none       Pt stated working at 10/10 on RPE scale entire session and understands need to give 10/10 effort .     Carryover assignment tracking:   Date Activity   1 7/22/2024 LSVT daily maximal exercises    2     3     4     5     6        Patient Education and Home Exercises       Education provided:   - Demonstration and instruction on all activities included in today's session  - LSVT Protocol    Written Home Exercises Provided: Provided in previous visit. Exercises were reviewed and Kusum was able to demonstrate them prior to the end of the session.  Kusum demonstrated good  understanding of the education provided. See EMR under Patient Instructions for exercises provided during therapy sessions    Assessment     Kusum tolerated her session well and demonstrated a fair understanding of the LSVT daily for her first session. She had better performance with the more repetitions she performed. She was educated on how to perform these at home with the back of a chair for added safety. Kusum is appropriate for continuing LSVT big protocol and skilled PT to address her remaining functional limitations.         Kusum Is progressing well towards her goals.   Pt prognosis is Good.     Pt will continue to benefit from skilled outpatient physical therapy to address the deficits listed in the problem list box on initial evaluation, provide pt/family education and to maximize pt's level of independence in the home and community environment.     Pt's spiritual, cultural and educational needs considered and pt agreeable to plan of care and goals.     Anticipated barriers to physical therapy: Patient's reported dislike of physical activity    Goals:     UPDATED GOALS:     Short Term Goals: (2 weeks) Date established: Status:    1. Patient will be provided with education and instruction re: LSVT exercises to be performed as part of home exercise program.  7/10/2024    Ongoing   2.  Patient will demonstrate improve endurance and activity tolerance as evidenced by ability to perform Nu-step x 15 minutes without rests breaks with heart rate post-activity equivalent to 50-80% of maximum heart rate.  7/10/2024    Ongoing   3. Patient will participate in 6 Minute Walk Test to further assess endurance and efficiency with community level gait and goals will be established accordingly.  7/10/2024    MET         Long Term Goals: (4 weeks) Date established: Status:    1. Patient will be independent and compliant with LSVT exercises to be performed 2x/day per LSVT BIG protocol.  7/10/2024    Ongoing   2.  Patient will increase her gait speed as assessed by the timed 10 Meter Walk Test from 1.0 m/s to 1.18 m/s to increase her safety and (I) with gait at a community level. (Minimal detectable change for Parkinson's Disease= 0.18 m/s)     7/10/2024    Ongoing      3. Patient will increase bilateral hip flexor strength as assessed by manual muscle test by 1/2 grade as appropriate to improve functional mobility.  7/10/2024    Ongoing   4. Patient will increase bilateral hip extensor strength as assessed by manual muscle test by 1/2 grade as  appropriate to improve functional mobility.  7/10/2024    Ongoing   5. Patient will begin some form of community fitness to begin regular and consistent performance of exercise to continue maintenance of gains made in physical therapy. 7/10/2024    Ongoing   6. Patient will improve her score on the 5 Times Sit to Stand test from 25 to seconds to </= 20 seconds without use of hands to demonstrate improved independence and efficiency with functional mobility. (Fall risk cut-off= >16)  7/10/2024    Ongoing   7. Patient will increase right hip flexion range of motion from 96 deg to 110 deg to improve her ability to tie her shoes.  7/10/2024    Ongoing   8. Patient will maintain right tandem stance for 30 seconds to demonstrate a reduction in fall risk.  7/10/2024    Ongoing   9. Patient will maintain left single leg stance for at least 10 seconds to demonstrate a reduction in fall risk.  7/10/2024    Ongoing   10. Patient will improve his/her score on the Functional Gait Assessment (FGA) from 21/30 to 25/30, indicating improved safety and (I) with dynamic,comunity level gait. (Minimal Detectable Change for PD= 4 points)  7/10/2024    Ongoing   11. Patient will improve 6 Minute Walk Test distance to at least 1300 feet to demonstrate improvements in endurance and efficiency with community- level gait.  7/22/2024   Ongoing       Plan     Continue to progress LSVT training as tolerated      Amos Melendez, ROLF     I certify that I was present in the room directing the student in service delivery and guiding them using my skilled judgment. As the co-signing therapist, I have reviewed the student's documentation and am responsible for the treatment, assessment and plan.     Cecelia Monge, PT   07/22/2024

## 2024-07-22 ENCOUNTER — CLINICAL SUPPORT (OUTPATIENT)
Dept: REHABILITATION | Facility: HOSPITAL | Age: 59
End: 2024-07-22
Payer: MEDICARE

## 2024-07-22 DIAGNOSIS — Z74.09 IMPAIRED FUNCTIONAL MOBILITY, BALANCE, GAIT, AND ENDURANCE: Primary | ICD-10-CM

## 2024-07-22 PROCEDURE — 97112 NEUROMUSCULAR REEDUCATION: CPT | Mod: KX,PO

## 2024-07-22 PROCEDURE — 97530 THERAPEUTIC ACTIVITIES: CPT | Mod: KX,PO

## 2024-07-23 ENCOUNTER — CLINICAL SUPPORT (OUTPATIENT)
Dept: REHABILITATION | Facility: HOSPITAL | Age: 59
End: 2024-07-23
Payer: MEDICARE

## 2024-07-23 DIAGNOSIS — Z74.09 IMPAIRED FUNCTIONAL MOBILITY, BALANCE, GAIT, AND ENDURANCE: Primary | ICD-10-CM

## 2024-07-23 PROCEDURE — 97112 NEUROMUSCULAR REEDUCATION: CPT | Mod: KX,PO

## 2024-07-23 PROCEDURE — 97530 THERAPEUTIC ACTIVITIES: CPT | Mod: KX,PO

## 2024-07-23 NOTE — PROGRESS NOTES
"[facility]   Physical Therapy Treatment Note      Name: Tika DECKER [facility]  Clinic Number: [id number]    Therapy Diagnosis:   Encounter Diagnosis   Name Primary?    Impaired functional mobility, balance, gait, and endurance Yes       Physician: Ariella Rao MD    Visit Date: 7/23/2024    Physician Orders: Eval and Treat   Medical Diagnosis from Referral: G20.B2 (ICD-10-CM) - Parkinson's disease with dyskinesia and fluctuating manifestations   Evaluation Date: 5/20/2024  Authorization Period Expiration: 12/31/2024   Plan of Care Expiration: 7/5/24   Updated Plan of Care Expiration: 8/23/24   Progress Note Due: 8/5/24   Visit # / Visits authorized: 8/20   FOTO: 2/3     Precautions: Standard and Fall     Time In: 0845   Time Out: 0925  Total Time: 40 minutes     PTA Visit #: 0/5       Subjective     Pt reports: "I am walking slow because I am so sore today."    She was not compliant with home exercise program. "I couldn't. There was no way."     Response to previous treatment: Very sore   Functional change: Ongoing    Pain: 0/10  Location: NA     Objective      Objective Measures updated at progress report unless specified.     Treatment     Kusum received the treatments listed below    LSVT Protocol    Week:  (Session:) 1/4  (2/16)       Tika DECKER received individual therapy including neuro re-education for 27 minutes including:    LSVT Maximal Daily Exercises to promote amplitude: (10 repetitions each)   Daily Exercises   Performance  Comments/Modifications   1  Floor <> Ceiling  good BIG Arms/Hands   VC to open hands    2  Side <> Side  good BIG Arms/Hands   VC to open hands    3  Step & Reach  (forward)   good with no upper extremity support    VC to coordinate UE/LE movements & weight shifts   CGA   4  Step & Reach (sideways)   good with no upper extremity support   VC to open hands  CGA   5  Step & Reach  (backwards)   fair with no upper extremity support    VC to coordinate " UE/LE movements & weight shifts   VC to take wider step back on right   VC to lift toes of front foot    6  Rock & Reach  (foward)   fair with no upper extremity support    VC for weight shifts   CGA   7  Rock & Reach   (sideways twist)   good with no upper extremity support        Patient participated in dynamic functional therapeutic activities to improve functional performance for 8 minutes. Including:     Functional Component Tasks:   Sit to stands: 10 reps from EOM (blue + Air Ex to increase seat height) with big arm swings - VC for foot placement and forward trunk lean   Practicing quarter, half and full turns: 3x60 sec to colored targets based on PT verbal cues    Hierarchy Task - NP 07/23/2024     Brief discussion about LSVT protocol, frequency to perform, & how to modify exercises with a chair if needed for extra stability.     Patient participated in gait training activities to normalize gait pattern for 05 minutes. The following activities were included:    BIG Walking   Gait belt used for safety.   Assistive device: none       Pt stated working at 10/10 on RPE scale entire session and understands need to give 10/10 effort .     Carryover assignment tracking:   Date Activity   1 7/22/2024 LSVT maximal daily exercises    2 7/22/2024 LSVT daily exercises before session tomorrow afternoon   3     4     5     6        Patient Education and Home Exercises       Education provided:   - Demonstration and instruction on all activities included in today's session  - LSVT Protocol    Written Home Exercises Provided: Provided in previous visit. Exercises were reviewed and Kusum was able to demonstrate them prior to the end of the session.  Kusum demonstrated good  understanding of the education provided. See EMR under Patient Instructions for exercises provided during therapy sessions    Assessment     Kusum tolerated her second LSVT BIG session well with reports of muscle soreness following first session  yesterday. Kusum demonstrated improved performance on seated and standing exercises with increased difficulty noted on right compared to left. She had the greatest difficulty coordinating reciprocal upper and lower extremity movements on forward rock and reach exercise and BIG walking. She was reminded how to perform exercises at home with the back of a chair for added safety. Kusum is appropriate for continuing LSVT big protocol and skilled PT to address her remaining functional limitations.        Kusum Is progressing well towards her goals.   Pt prognosis is Good.     Pt will continue to benefit from skilled outpatient physical therapy to address the deficits listed in the problem list box on initial evaluation, provide pt/family education and to maximize pt's level of independence in the home and community environment.     Pt's spiritual, cultural and educational needs considered and pt agreeable to plan of care and goals.     Anticipated barriers to physical therapy: Patient's reported dislike of physical activity    Goals:     UPDATED GOALS:     Short Term Goals: (2 weeks) Date established: Status:    1. Patient will be provided with education and instruction re: LSVT exercises to be performed as part of home exercise program.  7/10/2024    Ongoing   2.  Patient will demonstrate improve endurance and activity tolerance as evidenced by ability to perform Nu-step x 15 minutes without rests breaks with heart rate post-activity equivalent to 50-80% of maximum heart rate.  7/10/2024    Ongoing   3. Patient will participate in 6 Minute Walk Test to further assess endurance and efficiency with community level gait and goals will be established accordingly.  7/10/2024    MET         Long Term Goals: (4 weeks) Date established: Status:    1. Patient will be independent and compliant with LSVT exercises to be performed 2x/day per LSVT BIG protocol.  7/10/2024    Ongoing   2.  Patient will increase her gait speed as  assessed by the timed 10 Meter Walk Test from 1.0 m/s to 1.18 m/s to increase her safety and (I) with gait at a community level. (Minimal detectable change for Parkinson's Disease= 0.18 m/s)     7/10/2024    Ongoing      3. Patient will increase bilateral hip flexor strength as assessed by manual muscle test by 1/2 grade as appropriate to improve functional mobility.  7/10/2024    Ongoing   4. Patient will increase bilateral hip extensor strength as assessed by manual muscle test by 1/2 grade as appropriate to improve functional mobility.  7/10/2024    Ongoing   5. Patient will begin some form of community fitness to begin regular and consistent performance of exercise to continue maintenance of gains made in physical therapy. 7/10/2024    Ongoing   6. Patient will improve her score on the 5 Times Sit to Stand test from 25 to seconds to </= 20 seconds without use of hands to demonstrate improved independence and efficiency with functional mobility. (Fall risk cut-off= >16)  7/10/2024    Ongoing   7. Patient will increase right hip flexion range of motion from 96 deg to 110 deg to improve her ability to tie her shoes.  7/10/2024    Ongoing   8. Patient will maintain right tandem stance for 30 seconds to demonstrate a reduction in fall risk.  7/10/2024    Ongoing   9. Patient will maintain left single leg stance for at least 10 seconds to demonstrate a reduction in fall risk.  7/10/2024    Ongoing   10. Patient will improve his/her score on the Functional Gait Assessment (FGA) from 21/30 to 25/30, indicating improved safety and (I) with dynamic,comunity level gait. (Minimal Detectable Change for PD= 4 points)  7/10/2024    Ongoing   11. Patient will improve 6 Minute Walk Test distance to at least 1300 feet to demonstrate improvements in endurance and efficiency with community- level gait.  7/23/2024   Ongoing       Plan     Continue to progress LSVT training as tolerated    Francine Jeffrey, PT   07/23/2024

## 2024-07-23 NOTE — PROGRESS NOTES
"[facility]   Physical Therapy Treatment Note      Name: Tika DECKER [facility]  Clinic Number: [id number]    Therapy Diagnosis:   Encounter Diagnosis   Name Primary?    Impaired functional mobility, balance, gait, and endurance Yes         Physician: Ariella Rao MD    Visit Date: 7/24/2024    Physician Orders: Eval and Treat   Medical Diagnosis from Referral: G20.B2 (ICD-10-CM) - Parkinson's disease with dyskinesia and fluctuating manifestations   Evaluation Date: 5/20/2024  Authorization Period Expiration: 12/31/2024   Plan of Care Expiration: 7/5/24   Updated Plan of Care Expiration: 8/23/24   Progress Note Due: 8/5/24   Visit # / Visits authorized: 9/20   FOTO: 2/3     Precautions: Standard and Fall     Time In: 1635 (Pt arrived early)   Time Out: 1716  Total Time: 41 minutes     PTA Visit #: 0/5       Subjective     Pt reports: "I am ready. I am less sore the more I do the exercises."      She was compliant with home exercise program.     Response to previous treatment: Decreased soreness  Functional change: Ongoing    Pain: 0/10   Location: NA     Objective      Objective Measures updated at progress report unless specified.     Treatment     Kusum received the treatments listed below    LSVT Protocol    Week:  (Session:) 1/4  (3/16)       Tika DECKER received individual therapy including neuro re-education for 26 minutes including:    LSVT Maximal Daily Exercises to promote amplitude: (10 repetitions each)   Daily Exercises   Performance  Comments/Modifications   1  Floor <> Ceiling  good BIG Arms/Hands   Added finger flicks    2  Side <> Side  good BIG Arms/Hands   Added finger flicks    3  Step & Reach  (forward)   good with no upper extremity support    CGA   4  Step & Reach (sideways)   good with no upper extremity support (demonstrated use of chair for at home)   VC to open hands  CGA   5  Step & Reach  (backwards)   fair with no upper extremity support  (demonstrated use of " chair for at home)   VC to coordinate UE/LE movements & weight shifts   VC to take wider step back on right   VC to lift toes of front foot    6  Rock & Reach  (foward)   fair with no upper extremity support    VC for weight shifts   CGA   7  Rock & Reach   (sideways twist)   good with no upper extremity support    VC for weight shift       Patient participated in dynamic functional therapeutic activities to improve functional performance for 10 minutes. Including:     Functional Component Tasks:   Sit to stands: 10 reps from EOM (blue + Air Ex to increase seat height) with big arm reach  Practicing quarter, half and full turns: 3x60 sec to colored targets based on PT verbal cues  Stepping over hurdles: 3 laps in parallel bars     Hierarchy Task - NP 07/24/2024     Brief discussion about LSVT protocol, frequency to perform, & how to modify exercises with a chair if needed for extra stability.     Patient participated in gait training activities to normalize gait pattern for 05 minutes. The following activities were included:    BIG Walking   Gait belt used for safety.   Assistive device: none       Pt stated working at 10/10 on RPE scale entire session and understands need to give 10/10 effort .     Carryover assignment tracking:   Date Activity   1 7/22/2024 LSVT maximal daily exercises    2 7/22/2024 LSVT daily exercises before session tomorrow afternoon   3 7/24/2024 BIG trunk lean forward to stand up from toilet    4     5     6        Patient Education and Home Exercises       Education provided:   - Demonstration and instruction on all activities included in today's session  - LSVT Protocol    Written Home Exercises Provided: Provided in previous visit. Exercises were reviewed and Kusum was able to demonstrate them prior to the end of the session.  Kusum demonstrated good  understanding of the education provided. See EMR under Patient Instructions for exercises provided during therapy sessions    Assessment      Kusum tolerated her third LSVT BIG session well with reports of decreased muscle soreness and improved motivation at start of session. Kusum demonstrated improved performance on Maximal Daily Exercises with addition of finger flicks on seated exercises. She also demonstrated improvements in foot clearance during turning activity to targets. She safely stepped over hurdles without upper extremity support and with consistent foot clearance. She continues to have difficulty coordinating upper and lower extremity reciprocal movement with walking, although this did improve in today's session. She was reminded how to perform exercises at home with the back of a chair for added safety. Kusum is appropriate for continuing LSVT big protocol and skilled PT to address her remaining functional limitations.        Kusum Is progressing well towards her goals.   Pt prognosis is Good.     Pt will continue to benefit from skilled outpatient physical therapy to address the deficits listed in the problem list box on initial evaluation, provide pt/family education and to maximize pt's level of independence in the home and community environment.     Pt's spiritual, cultural and educational needs considered and pt agreeable to plan of care and goals.     Anticipated barriers to physical therapy: Patient's reported dislike of physical activity    Goals:     UPDATED GOALS:     Short Term Goals: (2 weeks) Date established: Status:    1. Patient will be provided with education and instruction re: LSVT exercises to be performed as part of home exercise program.  7/10/2024    Ongoing   2.  Patient will demonstrate improve endurance and activity tolerance as evidenced by ability to perform Nu-step x 15 minutes without rests breaks with heart rate post-activity equivalent to 50-80% of maximum heart rate.  7/10/2024    Ongoing   3. Patient will participate in 6 Minute Walk Test to further assess endurance and efficiency with community  level gait and goals will be established accordingly.  7/10/2024    MET         Long Term Goals: (4 weeks) Date established: Status:    1. Patient will be independent and compliant with LSVT exercises to be performed 2x/day per LSVT BIG protocol.  7/10/2024    Ongoing   2.  Patient will increase her gait speed as assessed by the timed 10 Meter Walk Test from 1.0 m/s to 1.18 m/s to increase her safety and (I) with gait at a community level. (Minimal detectable change for Parkinson's Disease= 0.18 m/s)     7/10/2024    Ongoing      3. Patient will increase bilateral hip flexor strength as assessed by manual muscle test by 1/2 grade as appropriate to improve functional mobility.  7/10/2024    Ongoing   4. Patient will increase bilateral hip extensor strength as assessed by manual muscle test by 1/2 grade as appropriate to improve functional mobility.  7/10/2024    Ongoing   5. Patient will begin some form of community fitness to begin regular and consistent performance of exercise to continue maintenance of gains made in physical therapy. 7/10/2024    Ongoing   6. Patient will improve her score on the 5 Times Sit to Stand test from 25 to seconds to </= 20 seconds without use of hands to demonstrate improved independence and efficiency with functional mobility. (Fall risk cut-off= >16)  7/10/2024    Ongoing   7. Patient will increase right hip flexion range of motion from 96 deg to 110 deg to improve her ability to tie her shoes.  7/10/2024    Ongoing   8. Patient will maintain right tandem stance for 30 seconds to demonstrate a reduction in fall risk.  7/10/2024    Ongoing   9. Patient will maintain left single leg stance for at least 10 seconds to demonstrate a reduction in fall risk.  7/10/2024    Ongoing   10. Patient will improve his/her score on the Functional Gait Assessment (FGA) from 21/30 to 25/30, indicating improved safety and (I) with dynamic,comunity level gait. (Minimal Detectable Change for PD= 4 points)   7/10/2024    Ongoing   11. Patient will improve 6 Minute Walk Test distance to at least 1300 feet to demonstrate improvements in endurance and efficiency with community- level gait.  7/24/2024   Ongoing       Plan     Continue to progress LSVT training as tolerated    Francine Jeffrey, PT   07/24/2024

## 2024-07-24 ENCOUNTER — CLINICAL SUPPORT (OUTPATIENT)
Dept: REHABILITATION | Facility: HOSPITAL | Age: 59
End: 2024-07-24
Payer: MEDICARE

## 2024-07-24 DIAGNOSIS — Z74.09 IMPAIRED FUNCTIONAL MOBILITY, BALANCE, GAIT, AND ENDURANCE: Primary | ICD-10-CM

## 2024-07-24 PROCEDURE — 97112 NEUROMUSCULAR REEDUCATION: CPT | Mod: KX,PO

## 2024-07-24 PROCEDURE — 97530 THERAPEUTIC ACTIVITIES: CPT | Mod: KX,PO

## 2024-07-25 ENCOUNTER — CLINICAL SUPPORT (OUTPATIENT)
Dept: REHABILITATION | Facility: HOSPITAL | Age: 59
End: 2024-07-25
Payer: MEDICARE

## 2024-07-25 DIAGNOSIS — Z74.09 IMPAIRED FUNCTIONAL MOBILITY, BALANCE, GAIT, AND ENDURANCE: Primary | ICD-10-CM

## 2024-07-25 PROCEDURE — 97530 THERAPEUTIC ACTIVITIES: CPT | Mod: KX,PO

## 2024-07-25 PROCEDURE — 97112 NEUROMUSCULAR REEDUCATION: CPT | Mod: KX,PO

## 2024-07-29 ENCOUNTER — CLINICAL SUPPORT (OUTPATIENT)
Dept: REHABILITATION | Facility: HOSPITAL | Age: 59
End: 2024-07-29
Payer: MEDICARE

## 2024-07-29 DIAGNOSIS — Z74.09 IMPAIRED FUNCTIONAL MOBILITY, BALANCE, GAIT, AND ENDURANCE: Primary | ICD-10-CM

## 2024-07-29 PROCEDURE — 97530 THERAPEUTIC ACTIVITIES: CPT | Mod: PO

## 2024-07-29 PROCEDURE — 97112 NEUROMUSCULAR REEDUCATION: CPT | Mod: PO

## 2024-07-29 NOTE — PROGRESS NOTES
[facility]   Physical Therapy Treatment Note      Name: Tika DECKER [facility] [place] Number: [id number]    Therapy Diagnosis:   Encounter Diagnosis   Name Primary?    Impaired functional mobility, balance, gait, and endurance Yes         Physician: Ariella Rao MD    Visit Date: 7/29/2024    Physician Orders: Eval and Treat   Medical Diagnosis from Referral: G20.B2 (ICD-10-CM) - Parkinson's disease with dyskinesia and fluctuating manifestations   Evaluation Date: 5/20/2024  Authorization Period Expiration: 12/31/2024   Plan of Care Expiration: 7/5/24   Updated Plan of Care Expiration: 8/23/24   Progress Note Due: 8/5/24   Visit # / Visits authorized: 11/20   FOTO: 2/3     Precautions: Standard and Fall     Time In: 16:50  Time Out: 17:35  Total Time: 45 minutes     PTA Visit #: 0/5       Subjective     Pt reports: Feeling good this week, exercises are getting easier.      She was compliant with home exercise program.     Response to previous treatment: Decreased soreness  Functional change: Ongoing    Pain: 0/10   Location: NA     Objective      Objective Measures updated at progress report unless specified.     Treatment     Kusum received the treatments listed below    LSVT Protocol    Week:  (Session:) 2/4  (5/16)       Tika DECKER received individual therapy including neuro re-education for 25 minutes including:    LSVT Maximal Daily Exercises to promote amplitude: (10 repetitions each)   Daily Exercises   Performance  Comments/Modifications   1  Floor <> Ceiling  good BIG Arms/Hands   Added finger flicks and arm reaches   2  Side <> Side  good BIG Arms/Hands   Added finger flicks  and arm reaches   3  Step & Reach  (forward)   good with no upper extremity support    CGA  Added foam roll, alternating sides    4  Step & Reach (sideways)   good with no upper extremity support   CGA  Added foam roll, alternating sides    5  Step & Reach  (backwards)   good with no upper extremity  support   VC to lift toes of front foot   Added foam roll, alternating sides    6  Rock & Reach  (foward)   fair with no upper extremity support    VC for weight shifts    7  Rock & Reach   (sideways twist)   good with no upper extremity support    VC for weight shift       Patient participated in dynamic functional therapeutic activities to improve functional performance for 20 minutes. Including:     Functional Component Tasks:   Sit to stands: 10 reps from EOM (black) with big arm reach  Practicing quarter, half and full turns: 3x60 sec to colored targets based on PT verbal cues - NP today  X 5 car legs over green box  25 feet x 2 High knee march 3 sec hold each side, multiple minor loss of balance able to self correct  25 feet x 2 back pruitt walking  25 feet walking lunge with trunk rotation, 1 loss of balance requires min assist to correct  Holding 5lb tidal tank:  100 feet around clinic  100 feet x 2 with head nods   100 feet x 2 with head rotations    Hierarchy Task - NP 07/24/2024      Patient participated in gait training activities to normalize gait pattern for 00 minutes. The following activities were included:    BIG Walking   Gait belt used for safety.   Assistive device: none       Pt stated working at 10/10 on RPE scale entire session and understands need to give 10/10 effort .     Carryover assignment tracking:   Date Activity   1 7/22/2024 LSVT maximal daily exercises    2 7/22/2024 LSVT daily exercises before session tomorrow afternoon   3 7/24/2024 BIG trunk lean forward to stand up from toilet    4      5     6        Patient Education and Home Exercises       Education provided:   - Demonstration and instruction on all activities included in today's session  - LSVT Protocol    Written Home Exercises Provided: Provided in previous visit. Exercises were reviewed and Kusum was able to demonstrate them prior to the end of the session.  Kusum demonstrated good  understanding of the education  provided. See EMR under Patient Instructions for exercises provided during therapy sessions    Assessment   Kusum continues to demo improvement in her daily exercises.  Progressed all standing exercises to alternating sides, with appropriate challenge.  Progressed dynamic gait activities with addition of tidal tank, alma's increased path deviation with head turns but no loss of balance.  Had increased challenge with high knee march and maintaining single leg stance, she had some loss of balances but able to self correct.  Kusum is appropriate for continuing LSVT big protocol and skilled PT to address her remaining functional limitations.        Kusum Is progressing well towards her goals.   Pt prognosis is Good.     Pt will continue to benefit from skilled outpatient physical therapy to address the deficits listed in the problem list box on initial evaluation, provide pt/family education and to maximize pt's level of independence in the home and community environment.     Pt's spiritual, cultural and educational needs considered and pt agreeable to plan of care and goals.     Anticipated barriers to physical therapy: Patient's reported dislike of physical activity    Goals:     UPDATED GOALS:     Short Term Goals: (2 weeks) Date established: Status:    1. Patient will be provided with education and instruction re: LSVT exercises to be performed as part of home exercise program.  7/10/2024    Ongoing   2.  Patient will demonstrate improve endurance and activity tolerance as evidenced by ability to perform Nu-step x 15 minutes without rests breaks with heart rate post-activity equivalent to 50-80% of maximum heart rate.  7/10/2024    Ongoing   3. Patient will participate in 6 Minute Walk Test to further assess endurance and efficiency with community level gait and goals will be established accordingly.  7/10/2024    MET         Long Term Goals: (4 weeks) Date established: Status:    1. Patient will be independent  and compliant with LSVT exercises to be performed 2x/day per LSVT BIG protocol.  7/10/2024    Ongoing   2.  Patient will increase her gait speed as assessed by the timed 10 Meter Walk Test from 1.0 m/s to 1.18 m/s to increase her safety and (I) with gait at a community level. (Minimal detectable change for Parkinson's Disease= 0.18 m/s)     7/10/2024    Ongoing      3. Patient will increase bilateral hip flexor strength as assessed by manual muscle test by 1/2 grade as appropriate to improve functional mobility.  7/10/2024    Ongoing   4. Patient will increase bilateral hip extensor strength as assessed by manual muscle test by 1/2 grade as appropriate to improve functional mobility.  7/10/2024    Ongoing   5. Patient will begin some form of community fitness to begin regular and consistent performance of exercise to continue maintenance of gains made in physical therapy. 7/10/2024    Ongoing   6. Patient will improve her score on the 5 Times Sit to Stand test from 25 to seconds to </= 20 seconds without use of hands to demonstrate improved independence and efficiency with functional mobility. (Fall risk cut-off= >16)  7/10/2024    Ongoing   7. Patient will increase right hip flexion range of motion from 96 deg to 110 deg to improve her ability to tie her shoes.  7/10/2024    Ongoing   8. Patient will maintain right tandem stance for 30 seconds to demonstrate a reduction in fall risk.  7/10/2024    Ongoing   9. Patient will maintain left single leg stance for at least 10 seconds to demonstrate a reduction in fall risk.  7/10/2024    Ongoing   10. Patient will improve his/her score on the Functional Gait Assessment (FGA) from 21/30 to 25/30, indicating improved safety and (I) with dynamic,comunity level gait. (Minimal Detectable Change for PD= 4 points)  7/10/2024    Ongoing   11. Patient will improve 6 Minute Walk Test distance to at least 1300 feet to demonstrate improvements in endurance and efficiency with  community- level gait.  7/29/2024   Ongoing       Plan     Continue to progress LSVT training as tolerated    Phma Capps, PT   07/24/2024

## 2024-07-30 ENCOUNTER — CLINICAL SUPPORT (OUTPATIENT)
Dept: REHABILITATION | Facility: HOSPITAL | Age: 59
End: 2024-07-30
Payer: MEDICARE

## 2024-07-30 DIAGNOSIS — G20.B2 PARKINSON'S DISEASE WITH DYSKINESIA AND FLUCTUATING MANIFESTATIONS: Primary | Chronic | ICD-10-CM

## 2024-07-30 PROCEDURE — 97530 THERAPEUTIC ACTIVITIES: CPT | Mod: PO,CQ

## 2024-07-30 PROCEDURE — 97112 NEUROMUSCULAR REEDUCATION: CPT | Mod: PO,CQ

## 2024-07-30 NOTE — PROGRESS NOTES
"[facility]   Physical Therapy Treatment Note      Name: Tika DECKER [facility] Number: [id number]    Therapy Diagnosis:   Encounter Diagnosis   Name Primary?    Parkinson's disease with dyskinesia and fluctuating manifestations Yes           Physician: Ariella Rao MD    Visit Date: 7/30/2024    Physician Orders: Eval and Treat   Medical Diagnosis from Referral: G20.B2 (ICD-10-CM) - Parkinson's disease with dyskinesia and fluctuating manifestations   Evaluation Date: 5/20/2024  Authorization Period Expiration: 12/31/2024   Plan of Care Expiration: 7/5/24   Updated Plan of Care Expiration: 8/23/24   Progress Note Due: 8/5/24   Visit # / Visits authorized: 12/20   FOTO: 2/3     Precautions: Standard and Fall     Time In: 0930  Time Out: 1015  Total Time: 45 minutes     PTA Visit #: 1/5       Subjective     Pt reports:" I thought I was late, but I was actually early.  I got here at 9."     She was compliant with home exercise program.     Response to previous treatment: Decreased soreness  Functional change: Ongoing    Pain: 0/10   Location: NA     Objective      Objective Measures updated at progress report unless specified.     Treatment     Kusum received the treatments listed below    LSVT Protocol    Week:  (Session:) 2/4  (6/16)       Tika DECKER received individual therapy including neuro re-education for 25 minutes including:    LSVT Maximal Daily Exercises to promote amplitude: (10 repetitions each)   Daily Exercises   Performance  Comments/Modifications   1  Floor <> Ceiling  good BIG Arms/Hands   Added finger flicks and arm reaches   2  Side <> Side  good BIG Arms/Hands   Added finger flicks  and arm reaches   3  Step & Reach  (forward)   good with no upper extremity support    CGA  Added foam roll, alternating sides    4  Step & Reach (sideways)   good with no upper extremity support   CGA  Added foam roll, alternating sides    5  Step & Reach  (backwards)   good with " no upper extremity support   VC to lift toes of front foot   Added foam roll, alternating sides    6  Rock & Reach  (foward)   fair with no upper extremity support    VC for weight shifts    7  Rock & Reach   (sideways twist)   good with no upper extremity support    VC for weight shift, alternating sides      Patient participated in dynamic functional therapeutic activities to improve functional performance for 13 minutes. Including:     Functional Component Tasks:   Sit to stands: 10 reps from EOM (black) with big arm reach  X 10 car legs over yoga block  Quarter turns x 5 each way  50 ft x 4 trials of backward ambulation     Hierarchy Task - NP 07/24/2024      Patient participated in gait training activities to normalize gait pattern for 7 minutes. The following activities were included:    BIG Walking naming countries in alphabetical order.  Focus on quarter turns at the end of each hallway  Gait belt used for safety.   Assistive device: none       Pt stated working at 10/10 on RPE scale entire session and understands need to give 10/10 effort .     Carryover assignment tracking:   Date Activity   1 7/22/2024 LSVT maximal daily exercises    2 7/22/2024 LSVT daily exercises before session tomorrow afternoon   3 7/24/2024 BIG trunk lean forward to stand up from toilet    4      5     6        Patient Education and Home Exercises       Education provided:   - Demonstration and instruction on all activities included in today's session  - LSVT Protocol    Written Home Exercises Provided: Provided in previous visit. Exercises were reviewed and Kusum was able to demonstrate them prior to the end of the session.  Kusum demonstrated good  understanding of the education provided. See EMR under Patient Instructions for exercises provided during therapy sessions    Assessment   Kusum tolerated her tx session well and cont to progress towards her goals.    Kusum began alternating sides for sideways rock and reach and  began dual tasking/naming countries in alphabetical order.  Cont with plan of care.       Kusum Is progressing well towards her goals.   Pt prognosis is Good.     Pt will continue to benefit from skilled outpatient physical therapy to address the deficits listed in the problem list box on initial evaluation, provide pt/family education and to maximize pt's level of independence in the home and community environment.     Pt's spiritual, cultural and educational needs considered and pt agreeable to plan of care and goals.     Anticipated barriers to physical therapy: Patient's reported dislike of physical activity    Goals:     UPDATED GOALS:     Short Term Goals: (2 weeks) Date established: Status:    1. Patient will be provided with education and instruction re: LSVT exercises to be performed as part of home exercise program.  7/10/2024    Ongoing   2.  Patient will demonstrate improve endurance and activity tolerance as evidenced by ability to perform Nu-step x 15 minutes without rests breaks with heart rate post-activity equivalent to 50-80% of maximum heart rate.  7/10/2024    Ongoing   3. Patient will participate in 6 Minute Walk Test to further assess endurance and efficiency with community level gait and goals will be established accordingly.  7/10/2024    MET         Long Term Goals: (4 weeks) Date established: Status:    1. Patient will be independent and compliant with LSVT exercises to be performed 2x/day per LSVT BIG protocol.  7/10/2024    Ongoing   2.  Patient will increase her gait speed as assessed by the timed 10 Meter Walk Test from 1.0 m/s to 1.18 m/s to increase her safety and (I) with gait at a community level. (Minimal detectable change for Parkinson's Disease= 0.18 m/s)     7/10/2024    Ongoing      3. Patient will increase bilateral hip flexor strength as assessed by manual muscle test by 1/2 grade as appropriate to improve functional mobility.  7/10/2024    Ongoing   4. Patient will increase  bilateral hip extensor strength as assessed by manual muscle test by 1/2 grade as appropriate to improve functional mobility.  7/10/2024    Ongoing   5. Patient will begin some form of community fitness to begin regular and consistent performance of exercise to continue maintenance of gains made in physical therapy. 7/10/2024    Ongoing   6. Patient will improve her score on the 5 Times Sit to Stand test from 25 to seconds to </= 20 seconds without use of hands to demonstrate improved independence and efficiency with functional mobility. (Fall risk cut-off= >16)  7/10/2024    Ongoing   7. Patient will increase right hip flexion range of motion from 96 deg to 110 deg to improve her ability to tie her shoes.  7/10/2024    Ongoing   8. Patient will maintain right tandem stance for 30 seconds to demonstrate a reduction in fall risk.  7/10/2024    Ongoing   9. Patient will maintain left single leg stance for at least 10 seconds to demonstrate a reduction in fall risk.  7/10/2024    Ongoing   10. Patient will improve his/her score on the Functional Gait Assessment (FGA) from 21/30 to 25/30, indicating improved safety and (I) with dynamic,comunity level gait. (Minimal Detectable Change for PD= 4 points)  7/10/2024    Ongoing   11. Patient will improve 6 Minute Walk Test distance to at least 1300 feet to demonstrate improvements in endurance and efficiency with community- level gait.  7/30/2024   Ongoing       Plan     Continue to progress LSVT training as tolerated    Melva Mendez, PTA   07/24/2024

## 2024-07-31 ENCOUNTER — CLINICAL SUPPORT (OUTPATIENT)
Dept: REHABILITATION | Facility: HOSPITAL | Age: 59
End: 2024-07-31
Payer: MEDICARE

## 2024-07-31 DIAGNOSIS — Z74.09 IMPAIRED FUNCTIONAL MOBILITY, BALANCE, GAIT, AND ENDURANCE: Primary | ICD-10-CM

## 2024-07-31 PROCEDURE — 97530 THERAPEUTIC ACTIVITIES: CPT | Mod: KX,PO

## 2024-07-31 PROCEDURE — 97112 NEUROMUSCULAR REEDUCATION: CPT | Mod: KX,PO

## 2024-07-31 PROCEDURE — 97116 GAIT TRAINING THERAPY: CPT | Mod: KX,PO

## 2024-07-31 NOTE — PROGRESS NOTES
[facility]   Physical Therapy Treatment Note      Name: Tika DECKER [facility] Number: [id number]    Therapy Diagnosis:   Encounter Diagnosis   Name Primary?    Impaired functional mobility, balance, gait, and endurance Yes     Physician: Ariella Rao MD    Visit Date: 7/31/2024    Physician Orders: Eval and Treat   Medical Diagnosis from Referral: G20.B2 (ICD-10-CM) - Parkinson's disease with dyskinesia and fluctuating manifestations   Evaluation Date: 5/20/2024  Authorization Period Expiration: 12/31/2024   Plan of Care Expiration: 7/5/24   Updated Plan of Care Expiration: 8/23/24   Progress Note Due: 8/5/24   Visit # / Visits authorized: 13/20   FOTO: 2/3     Precautions: Standard and Fall     Time In: 1029 am   Time Out: 1112  Total Time: 43 minutes     PTA Visit #: 0/5       Subjective     Pt reports: Her left hand has more tremors than usual today.     She was compliant with home exercise program.     Response to previous treatment: Decreased soreness  Functional change: Ongoing    Pain: 0/10   Location: NA     Objective      Objective Measures updated at progress report unless specified.     Treatment     Kusum received the treatments listed below    LSVT Protocol    Week:  (Session:) 2/4  (7/16)       Tika DECKER received individual therapy including neuro re-education for 25 minutes including:    LSVT Maximal Daily Exercises to promote amplitude: (10 repetitions each)   Daily Exercises   Performance  Comments/Modifications   1  Floor <> Ceiling  good BIG Arms/Hands   Added finger flicks and arm reaches   2  Side <> Side  good BIG Arms/Hands   Added finger flicks  and arm reaches   3  Step & Reach  (forward)   good with no upper extremity support    CGA  Added foam roll, alternating sides    4  Step & Reach (sideways)   good with no upper extremity support   CGA  Added foam roll, alternating sides    5  Step & Reach  (backwards)   good with no upper extremity support    VC to lift toes of front foot   Added foam roll, alternating sides    6  Rock & Reach  (foward)   fair with no upper extremity support    VC for weight shifts    7  Rock & Reach   (sideways twist)   good with no upper extremity support    VC for weight shift, alternating sides      Patient participated in dynamic functional therapeutic activities to improve functional performance for 10 minutes. Including:     Functional Component Tasks:   Sit to stands: 10 reps from EOM (black) with big arm reach  2 x 10 car legs over yoga block  Quarter turns x 5 each way  50 ft x 4 trials of backward ambulation - one instance of LOB to right, requiring min A to recover  Holding 5lb tidal tank:  100 feet around clinic  100 feet x 2 with head nods   100 feet x 2 with head rotations  10 x right and left BIG knee to chest with 3 sec hold       Hierarchy Task - NP 07/31/2024      Patient participated in gait training activities to normalize gait pattern for 8 minutes. The following activities were included:    BIG Walking naming first names and foods in alphabetical order.  Focus on quarter turns at the end of each hallway  Gait belt used for safety.   Assistive device: none       Pt stated working at 10/10 on RPE scale entire session and understands need to give 10/10 effort .     Carryover assignment tracking:   Date Activity   1 7/22/2024 LSVT maximal daily exercises    2 7/22/2024 LSVT daily exercises before session tomorrow afternoon   3 7/24/2024 BIG trunk lean forward to stand up from toilet    4 7/31/2024 BIG turns when putting dishes away    5     6        Patient Education and Home Exercises       Education provided:   - Demonstration and instruction on all activities included in today's session  - LSVT Protocol    Written Home Exercises Provided: Provided in previous visit. Exercises were reviewed and Kusum was able to demonstrate them prior to the end of the session.  Kusum demonstrated good  understanding of the  education provided. See EMR under Patient Instructions for exercises provided during therapy sessions    Assessment   Kusum tolerated session well and completed Maximal Daily Exercises more quickly than previous sessions. She demonstrated improvements in BIG walking but had a noted decrease in amplitude of movement when adding a more complicated dual task (naming foods in alphabetical order). PT continuing to incorporate activities to address right hip flexion range of motion due to reports of difficulty putting shoes on. She remains appropriate for therapy.      Kusum Is progressing well towards her goals.   Pt prognosis is Good.     Pt will continue to benefit from skilled outpatient physical therapy to address the deficits listed in the problem list box on initial evaluation, provide pt/family education and to maximize pt's level of independence in the home and community environment.     Pt's spiritual, cultural and educational needs considered and pt agreeable to plan of care and goals.     Anticipated barriers to physical therapy: Patient's reported dislike of physical activity    Goals:     UPDATED GOALS:     Short Term Goals: (2 weeks) Date established: Status:    1. Patient will be provided with education and instruction re: LSVT exercises to be performed as part of home exercise program.  7/10/2024    Ongoing   2.  Patient will demonstrate improve endurance and activity tolerance as evidenced by ability to perform Nu-step x 15 minutes without rests breaks with heart rate post-activity equivalent to 50-80% of maximum heart rate.  7/10/2024    Ongoing   3. Patient will participate in 6 Minute Walk Test to further assess endurance and efficiency with community level gait and goals will be established accordingly.  7/10/2024    MET         Long Term Goals: (4 weeks) Date established: Status:    1. Patient will be independent and compliant with LSVT exercises to be performed 2x/day per LSVT BIG protocol.   7/10/2024    Ongoing   2.  Patient will increase her gait speed as assessed by the timed 10 Meter Walk Test from 1.0 m/s to 1.18 m/s to increase her safety and (I) with gait at a community level. (Minimal detectable change for Parkinson's Disease= 0.18 m/s)     7/10/2024    Ongoing      3. Patient will increase bilateral hip flexor strength as assessed by manual muscle test by 1/2 grade as appropriate to improve functional mobility.  7/10/2024    Ongoing   4. Patient will increase bilateral hip extensor strength as assessed by manual muscle test by 1/2 grade as appropriate to improve functional mobility.  7/10/2024    Ongoing   5. Patient will begin some form of community fitness to begin regular and consistent performance of exercise to continue maintenance of gains made in physical therapy. 7/10/2024    Ongoing   6. Patient will improve her score on the 5 Times Sit to Stand test from 25 to seconds to </= 20 seconds without use of hands to demonstrate improved independence and efficiency with functional mobility. (Fall risk cut-off= >16)  7/10/2024    Ongoing   7. Patient will increase right hip flexion range of motion from 96 deg to 110 deg to improve her ability to tie her shoes.  7/10/2024    Ongoing   8. Patient will maintain right tandem stance for 30 seconds to demonstrate a reduction in fall risk.  7/10/2024    Ongoing   9. Patient will maintain left single leg stance for at least 10 seconds to demonstrate a reduction in fall risk.  7/10/2024    Ongoing   10. Patient will improve his/her score on the Functional Gait Assessment (FGA) from 21/30 to 25/30, indicating improved safety and (I) with dynamic,comunity level gait. (Minimal Detectable Change for PD= 4 points)  7/10/2024    Ongoing   11. Patient will improve 6 Minute Walk Test distance to at least 1300 feet to demonstrate improvements in endurance and efficiency with community- level gait.  7/31/2024   Ongoing       Plan     Continue to progress LSVT  training as tolerated    Francine Jefrfey, PT   07/24/2024

## 2024-08-01 ENCOUNTER — CLINICAL SUPPORT (OUTPATIENT)
Dept: REHABILITATION | Facility: HOSPITAL | Age: 59
End: 2024-08-01
Payer: MEDICARE

## 2024-08-01 DIAGNOSIS — Z74.09 IMPAIRED FUNCTIONAL MOBILITY, BALANCE, GAIT, AND ENDURANCE: Primary | ICD-10-CM

## 2024-08-01 PROCEDURE — 97112 NEUROMUSCULAR REEDUCATION: CPT | Mod: KX,PO

## 2024-08-01 PROCEDURE — 97530 THERAPEUTIC ACTIVITIES: CPT | Mod: KX,PO

## 2024-08-01 PROCEDURE — 97116 GAIT TRAINING THERAPY: CPT | Mod: KX,PO

## 2024-08-01 NOTE — PROGRESS NOTES
[facility]   Physical Therapy Treatment Note      Name: Tika DECKER [facility] Number: [id number]    Therapy Diagnosis:   Encounter Diagnosis   Name Primary?    Impaired functional mobility, balance, gait, and endurance Yes       Physician: Ariella Rao MD    Visit Date: 8/1/2024    Physician Orders: Eval and Treat   Medical Diagnosis from Referral: G20.B2 (ICD-10-CM) - Parkinson's disease with dyskinesia and fluctuating manifestations   Evaluation Date: 5/20/2024  Authorization Period Expiration: 12/31/2024   Plan of Care Expiration: 7/5/24   Updated Plan of Care Expiration: 8/23/24   Progress Note Due: 8/5/24   Visit # / Visits authorized: 14/20   FOTO: 2/3     Precautions: Standard and Fall     Time In: 1034 am (patient arrived late)   Time Out: 1112  Total Time: 38 minutes     PTA Visit #: 0/5       Subjective     Pt reports: She is moving slow this morning. Her low back is bothering her and she had a hard time walking this morning. Feeling better right now.     She was compliant with home exercise program.     Response to previous treatment: Decreased soreness  Functional change: Ongoing    Pain: not rated/10   Location: Low back    Objective      Objective Measures updated at progress report unless specified.     Treatment     Kusum received the treatments listed below    LSVT Protocol    Week:  (Session:) 2/4  (8/16)       Tika DECKER received individual therapy including neuro re-education for 15 minutes including:    LSVT Maximal Daily Exercises to promote amplitude: (10 repetitions each)   Daily Exercises   Performance  Comments/Modifications   1  Floor <> Ceiling  good BIG Arms/Hands   Added finger flicks and arm reaches   2  Side <> Side  good BIG Arms/Hands   Added finger flicks  and arm reaches  Alternating sides    3  Step & Reach  (forward)   good with no upper extremity support    CGA  Added foam roll, alternating sides    4  Step & Reach (sideways)   good with  no upper extremity support   CGA  Added foam roll, alternating sides    5  Step & Reach  (backwards)   good with no upper extremity support   VC to lift toes of front foot   Added foam roll, alternating sides    6  Rock & Reach  (foward)   fair with no upper extremity support    VC for weight shifts    7  Rock & Reach   (sideways twist)   good with no upper extremity support    VC for weight shift, alternating sides      Patient participated in dynamic functional therapeutic activities to improve functional performance for 15 minutes. Including:     Functional Component Tasks:   Sit to stands: 10 reps from EOM (black) with big arm reach  2 x 10 car legs over yoga block  Quarter turns x 5 each way  50 ft x 4 trials of backward ambulation   Holding 5lb tidal tank:  100 feet around clinic  100 feet x 2 with head nods   100 feet x 2 with head rotations  10 x right and left BIG knee to chest with 3 sec hold     Hierarchy Task - NP 08/1/2024        Patient participated in gait training activities to normalize gait pattern for 8 minutes. The following activities were included:    BIG Walking naming countries in alphabetical order, counting backward from 100 by 5 and counting backward from 100 by 3.  VC for BIG steps and BIG arms.   Gait belt used for safety.   Assistive device: none       Pt stated working at 10/10 on RPE scale entire session and understands need to give 10/10 effort .     Carryover assignment tracking:   Date Activity   1 7/22/2024 LSVT maximal daily exercises    2 7/22/2024 LSVT daily exercises before session tomorrow afternoon   3 7/24/2024 BIG trunk lean forward to stand up from toilet    4 8/1/2024 BIG turns when putting dishes away    5     6        Patient Education and Home Exercises       Education provided:   - Demonstration and instruction on all activities included in today's session  - LSVT Protocol    Written Home Exercises Provided: Provided in previous visit. Exercises were reviewed and  Kusum was able to demonstrate them prior to the end of the session.  Kusum demonstrated good  understanding of the education provided. See EMR under Patient Instructions for exercises provided during therapy sessions    Assessment   Kusum tolerated session well with improvements noted on backward step and reach exercise. She had a noted decrease in amplitude of movement when adding a more complicated dual task (naming countries in alphabetical order / counting backward from 100 by 3). She stated concern over increase in left hand tremors today and had slight difficulty performing BIG hands on left. She remains appropriate for therapy.      Kusum Is progressing well towards her goals.   Pt prognosis is Good.     Pt will continue to benefit from skilled outpatient physical therapy to address the deficits listed in the problem list box on initial evaluation, provide pt/family education and to maximize pt's level of independence in the home and community environment.     Pt's spiritual, cultural and educational needs considered and pt agreeable to plan of care and goals.     Anticipated barriers to physical therapy: Patient's reported dislike of physical activity    Goals:     UPDATED GOALS:     Short Term Goals: (2 weeks) Date established: Status:    1. Patient will be provided with education and instruction re: LSVT exercises to be performed as part of home exercise program.  7/10/2024    Ongoing   2.  Patient will demonstrate improve endurance and activity tolerance as evidenced by ability to perform Nu-step x 15 minutes without rests breaks with heart rate post-activity equivalent to 50-80% of maximum heart rate.  7/10/2024    Ongoing   3. Patient will participate in 6 Minute Walk Test to further assess endurance and efficiency with community level gait and goals will be established accordingly.  7/10/2024    MET         Long Term Goals: (4 weeks) Date established: Status:    1. Patient will be independent and  compliant with LSVT exercises to be performed 2x/day per LSVT BIG protocol.  7/10/2024    Ongoing   2.  Patient will increase her gait speed as assessed by the timed 10 Meter Walk Test from 1.0 m/s to 1.18 m/s to increase her safety and (I) with gait at a community level. (Minimal detectable change for Parkinson's Disease= 0.18 m/s)     7/10/2024    Ongoing      3. Patient will increase bilateral hip flexor strength as assessed by manual muscle test by 1/2 grade as appropriate to improve functional mobility.  7/10/2024    Ongoing   4. Patient will increase bilateral hip extensor strength as assessed by manual muscle test by 1/2 grade as appropriate to improve functional mobility.  7/10/2024    Ongoing   5. Patient will begin some form of community fitness to begin regular and consistent performance of exercise to continue maintenance of gains made in physical therapy. 7/10/2024    Ongoing   6. Patient will improve her score on the 5 Times Sit to Stand test from 25 to seconds to </= 20 seconds without use of hands to demonstrate improved independence and efficiency with functional mobility. (Fall risk cut-off= >16)  7/10/2024    Ongoing   7. Patient will increase right hip flexion range of motion from 96 deg to 110 deg to improve her ability to tie her shoes.  7/10/2024    Ongoing   8. Patient will maintain right tandem stance for 30 seconds to demonstrate a reduction in fall risk.  7/10/2024    Ongoing   9. Patient will maintain left single leg stance for at least 10 seconds to demonstrate a reduction in fall risk.  7/10/2024    Ongoing   10. Patient will improve his/her score on the Functional Gait Assessment (FGA) from 21/30 to 25/30, indicating improved safety and (I) with dynamic,comunity level gait. (Minimal Detectable Change for PD= 4 points)  7/10/2024    Ongoing   11. Patient will improve 6 Minute Walk Test distance to at least 1300 feet to demonstrate improvements in endurance and efficiency with community-  level gait.  8/1/2024   Ongoing       Plan     Continue to progress LSVT training as tolerated    Francine Jeffrey, PT   07/24/2024

## 2024-08-02 ENCOUNTER — DOCUMENTATION ONLY (OUTPATIENT)
Dept: REHABILITATION | Facility: HOSPITAL | Age: 59
End: 2024-08-02
Payer: MEDICARE

## 2024-08-02 NOTE — PROGRESS NOTES
PT/PTA met face to face to discuss pt's treatment plan and progress towards established goals.  Continue with current PT POC with focus on cont with LSvt protocol.  Patient will be seen by physical therapist at least every sixth treatment or 30 days, whichever occurs first.    Melva eMndez, PTA  08/02/2024

## 2024-08-05 ENCOUNTER — CLINICAL SUPPORT (OUTPATIENT)
Dept: REHABILITATION | Facility: HOSPITAL | Age: 59
End: 2024-08-05
Payer: MEDICARE

## 2024-08-05 ENCOUNTER — PATIENT OUTREACH (OUTPATIENT)
Dept: ADMINISTRATIVE | Facility: HOSPITAL | Age: 59
End: 2024-08-05
Payer: MEDICARE

## 2024-08-05 DIAGNOSIS — Z74.09 IMPAIRED FUNCTIONAL MOBILITY, BALANCE, GAIT, AND ENDURANCE: Primary | ICD-10-CM

## 2024-08-05 PROCEDURE — 97112 NEUROMUSCULAR REEDUCATION: CPT | Mod: KX,PO

## 2024-08-05 PROCEDURE — 97530 THERAPEUTIC ACTIVITIES: CPT | Mod: KX,PO

## 2024-08-06 ENCOUNTER — CLINICAL SUPPORT (OUTPATIENT)
Dept: REHABILITATION | Facility: HOSPITAL | Age: 59
End: 2024-08-06
Payer: MEDICARE

## 2024-08-06 DIAGNOSIS — Z74.09 IMPAIRED FUNCTIONAL MOBILITY, BALANCE, GAIT, AND ENDURANCE: Primary | ICD-10-CM

## 2024-08-06 PROCEDURE — 97112 NEUROMUSCULAR REEDUCATION: CPT | Mod: KX,PO

## 2024-08-06 PROCEDURE — 97530 THERAPEUTIC ACTIVITIES: CPT | Mod: KX,PO

## 2024-08-06 PROCEDURE — 97116 GAIT TRAINING THERAPY: CPT | Mod: KX,PO

## 2024-08-07 ENCOUNTER — CLINICAL SUPPORT (OUTPATIENT)
Dept: REHABILITATION | Facility: HOSPITAL | Age: 59
End: 2024-08-07
Payer: MEDICARE

## 2024-08-07 DIAGNOSIS — Z74.09 IMPAIRED FUNCTIONAL MOBILITY, BALANCE, GAIT, AND ENDURANCE: Primary | ICD-10-CM

## 2024-08-07 PROCEDURE — 97530 THERAPEUTIC ACTIVITIES: CPT | Mod: KX,PO

## 2024-08-07 PROCEDURE — 97116 GAIT TRAINING THERAPY: CPT | Mod: KX,PO

## 2024-08-07 PROCEDURE — 97112 NEUROMUSCULAR REEDUCATION: CPT | Mod: KX,PO

## 2024-08-08 ENCOUNTER — CLINICAL SUPPORT (OUTPATIENT)
Dept: REHABILITATION | Facility: HOSPITAL | Age: 59
End: 2024-08-08
Payer: MEDICARE

## 2024-08-08 DIAGNOSIS — Z74.09 IMPAIRED FUNCTIONAL MOBILITY, BALANCE, GAIT, AND ENDURANCE: Primary | ICD-10-CM

## 2024-08-08 PROCEDURE — 97530 THERAPEUTIC ACTIVITIES: CPT | Mod: KX,PO

## 2024-08-08 PROCEDURE — 97112 NEUROMUSCULAR REEDUCATION: CPT | Mod: KX,PO

## 2024-08-08 PROCEDURE — 97116 GAIT TRAINING THERAPY: CPT | Mod: KX,PO

## 2024-08-12 ENCOUNTER — CLINICAL SUPPORT (OUTPATIENT)
Dept: REHABILITATION | Facility: HOSPITAL | Age: 59
End: 2024-08-12
Payer: MEDICARE

## 2024-08-12 DIAGNOSIS — G20.B2 PARKINSON'S DISEASE WITH DYSKINESIA AND FLUCTUATING MANIFESTATIONS: Chronic | ICD-10-CM

## 2024-08-12 DIAGNOSIS — Z74.09 IMPAIRED FUNCTIONAL MOBILITY, BALANCE, GAIT, AND ENDURANCE: Primary | ICD-10-CM

## 2024-08-12 PROCEDURE — 97112 NEUROMUSCULAR REEDUCATION: CPT | Mod: PO

## 2024-08-12 PROCEDURE — 97530 THERAPEUTIC ACTIVITIES: CPT | Mod: PO

## 2024-08-12 NOTE — PROGRESS NOTES
"[facility]   Physical Therapy Treatment Note      Name: Tika DECKER [facility]  Clinic Number: [id number]    Therapy Diagnosis:   Encounter Diagnosis   Name Primary?    Impaired functional mobility, balance, gait, and endurance Yes           Physician: Ariella Rao MD    Visit Date: 8/12/2024    Physician Orders: Eval and Treat   Medical Diagnosis from Referral: G20.B2 (ICD-10-CM) - Parkinson's disease with dyskinesia and fluctuating manifestations   Evaluation Date: 5/20/2024  Authorization Period Expiration: 12/31/2024   Plan of Care Expiration: 7/5/24   Updated Plan of Care Expiration: 8/23/24   Progress Note Due: 8/5/24   Visit # / Visits authorized: 19/20  FOTO: 2/3     Precautions: Standard and Fall     Time In: 08:05 am  Time Out: 08:45 am    Total Time: 40 minutes     PTA Visit #: 0/5       Subjective     Pt reports: "This program went by quickly!"    She was compliant with home exercise program.     Response to previous treatment: Denies soreness  Functional change: Ongoing    Pain: 0/10   Location: NA    Objective      Objective measures updated on 8/5/24.       Treatment     Kusum received the treatments listed below    LSVT Protocol    Week:  (Session:) 4/4  (13/16)       Tika DECKER received individual therapy including neuro re-education for 25 minutes including:    LSVT Maximal Daily Exercises to promote amplitude: (10 repetitions each)   Daily Exercises   Performance  Comments/Modifications   1  Floor <> Ceiling  good BIG Arms/Hands   Sitting on red swiss ball   2  Side <> Side  good BIG Arms/Hands   Sitting on red swiss ball  Alternating sides    3  Step & Reach  (forward)   good occasional upper extremity support    CGA  Stepping over sary onto foam pad, alternating sides    4  Step & Reach (sideways)   good with no upper extremity support   CGA  Stepping over hurdles onto foam pad, alternating sides    5  Step & Reach  (backwards)   good occasional upper extremity " support   Stepping over sary onto foam pad, alternating sides    6  Rock & Reach  (foward)   good with no upper extremity support    Both feet on foam pads   7  Rock & Reach   (sideways twist)   good with no upper extremity support    Both feet on foam pads       Patient participated in dynamic functional therapeutic activities to improve functional performance for 15 minutes. Including:     Functional Component Tasks:   Sit to stands: NP today   Tall kneel to half kneel x 4 each side, occ touch down support  Half kneel hip flexor stretch x 60 sec each side  Half kneel hamstring stretch x 60 sec each side   High knee marching with opposite upper extremity reach     Hierarchy Task - Practicing floor transfer x 2    On blue mat - into downward dog pose        Patient participated in gait training activities to normalize gait pattern for 0 minutes. The following activities were included:   BIG Walking naming animals in alphabetical order.  VC for BIG steps and BIG arms.   Gait belt used for safety.   Assistive device: none       Pt stated working at 10/10 on RPE scale entire session and understands need to give 10/10 effort .     Carryover assignment tracking:   Date Activity   1 7/22/2024 LSVT maximal daily exercises    2 7/22/2024 LSVT daily exercises before session tomorrow afternoon   3 7/24/2024 BIG trunk lean forward to stand up from toilet    4 8/12/2024 BIG turns when putting dishes away    5     6        Patient Education and Home Exercises       Education provided:   - Demonstration and instruction on all activities included in today's session  - LSVT Protocol    Written Home Exercises Provided: Provided in previous visit. Exercises were reviewed and Kusum was able to demonstrate them prior to the end of the session.  Kusum demonstrated good  understanding of the education provided. See EMR under Patient Instructions for exercises provided during therapy sessions    Assessment   Kusum tolerated session  very well with progress of all daily exercises today.  She requires occasional touch down support for stepping over hurdles onto foam pad, but no loss of balance.  Practiced floor transfer, able to complete moving through downward dog position.  Half kneeling mostly limited by right hip range of motion from multiple surgeries.  She remains appropriate for therapy.      Kusum Is progressing well towards her goals.   Pt prognosis is Good.     Pt will continue to benefit from skilled outpatient physical therapy to address the deficits listed in the problem list box on initial evaluation, provide pt/family education and to maximize pt's level of independence in the home and community environment.     Pt's spiritual, cultural and educational needs considered and pt agreeable to plan of care and goals.     Anticipated barriers to physical therapy: Patient's reported dislike of physical activity    Goals:     UPDATED GOALS:     Short Term Goals: (2 weeks) Date established: Status:    1. Patient will be provided with education and instruction re: LSVT exercises to be performed as part of home exercise program.  7/10/2024    MET   2.  Patient will demonstrate improve endurance and activity tolerance as evidenced by ability to perform Nu-step x 15 minutes without rests breaks with heart rate post-activity equivalent to 50-80% of maximum heart rate.  7/10/2024    Ongoing   3. Patient will participate in 6 Minute Walk Test to further assess endurance and efficiency with community level gait and goals will be established accordingly.  7/10/2024    MET         Long Term Goals: (4 weeks) Date established: Status:    1. Patient will be independent and compliant with LSVT exercises to be performed 2x/day per LSVT BIG protocol.  7/10/2024    MET   2.  Patient will increase her gait speed as assessed by the timed 10 Meter Walk Test from 1.0 m/s to 1.18 m/s to increase her safety and (I) with gait at a community level. (Minimal  detectable change for Parkinson's Disease= 0.18 m/s)     7/10/2024    Ongoing      3. Patient will increase bilateral hip flexor strength as assessed by manual muscle test by 1/2 grade as appropriate to improve functional mobility.  7/10/2024    Ongoing   4. Patient will increase bilateral hip extensor strength as assessed by manual muscle test by 1/2 grade as appropriate to improve functional mobility.  7/10/2024    Ongoing   5. Patient will begin some form of community fitness to begin regular and consistent performance of exercise to continue maintenance of gains made in physical therapy. 7/10/2024    Ongoing   6. Patient will improve her score on the 5 Times Sit to Stand test from 25 to seconds to </= 20 seconds without use of hands to demonstrate improved independence and efficiency with functional mobility. (Fall risk cut-off= >16)  7/10/2024    MET   7. Patient will increase right hip flexion range of motion from 96 deg to 110 deg to improve her ability to tie her shoes.  7/10/2024    Ongoing   8. Patient will maintain right tandem stance for 30 seconds to demonstrate a reduction in fall risk.  7/10/2024    MET   9. Patient will maintain left single leg stance for at least 10 seconds to demonstrate a reduction in fall risk.  7/10/2024    Ongoing   10. Patient will improve his/her score on the Functional Gait Assessment (FGA) from 21/30 to 25/30, indicating improved safety and (I) with dynamic,comunity level gait. (Minimal Detectable Change for PD= 4 points)  7/10/2024    Ongoing   11. Patient will improve 6 Minute Walk Test distance to at least 1300 feet to demonstrate improvements in endurance and efficiency with community- level gait.  8/12/2024   Ongoing       Plan     Continue to progress LSVT training as tolerated    Pham Capps, PT   07/24/2024

## 2024-08-13 ENCOUNTER — CLINICAL SUPPORT (OUTPATIENT)
Dept: REHABILITATION | Facility: HOSPITAL | Age: 59
End: 2024-08-13
Payer: MEDICARE

## 2024-08-13 DIAGNOSIS — G20.B2 PARKINSON'S DISEASE WITH DYSKINESIA AND FLUCTUATING MANIFESTATIONS: Primary | Chronic | ICD-10-CM

## 2024-08-13 PROCEDURE — 97112 NEUROMUSCULAR REEDUCATION: CPT | Mod: PO,CQ

## 2024-08-13 PROCEDURE — 97110 THERAPEUTIC EXERCISES: CPT | Mod: PO,CQ

## 2024-08-13 PROCEDURE — 97116 GAIT TRAINING THERAPY: CPT | Mod: PO,CQ

## 2024-08-13 NOTE — PROGRESS NOTES
"[facility]   Physical Therapy Treatment Note      Name: Tika DECKER [facility]  Clinic Number: [id number]    Therapy Diagnosis:   Encounter Diagnosis   Name Primary?    Parkinson's disease with dyskinesia and fluctuating manifestations Yes             Physician: Ariella Rao MD    Visit Date: 8/13/2024    Physician Orders: Eval and Treat   Medical Diagnosis from Referral: G20.B2 (ICD-10-CM) - Parkinson's disease with dyskinesia and fluctuating manifestations   Evaluation Date: 5/20/2024  Authorization Period Expiration: 12/31/2024   Plan of Care Expiration: 7/5/24   Updated Plan of Care Expiration: 8/23/24   Progress Note Due: 8/5/24   Visit # / Visits authorized: 20/20  FOTO: 2/3     Precautions: Standard and Fall     Time In: 1430   Time Out: 1515   Total Time: 45 minutes     PTA Visit #: 1/5       Subjective     Pt reports: "Dont make me do that ball again."     She was compliant with home exercise program.     Response to previous treatment: Denies soreness  Functional change: Ongoing    Pain: 0/10   Location: NA    Objective      Objective measures updated on 8/5/24.       Treatment     Kusum received the treatments listed below    LSVT Protocol    Week:  (Session:) 4/4  (14/16)       Tika DECKER received individual therapy including neuro re-education for 22 minutes including:    LSVT Maximal Daily Exercises to promote amplitude: (10 repetitions each)   Daily Exercises   Performance  Comments/Modifications   1  Floor <> Ceiling  good BIG Arms/Hands   Sitting on red swiss ball   2  Side <> Side  good BIG Arms/Hands   Sitting on red swiss ball  Alternating sides    3  Step & Reach  (forward)   good occasional upper extremity support    CGA  Stepping over sary onto foam pad, alternating sides    4  Step & Reach (sideways)   good with no upper extremity support   CGA  Stepping over hurdles onto foam pad, alternating sides    5  Step & Reach  (backwards)   good occasional upper " extremity support   Stepping over sary onto foam pad, alternating sides    6  Rock & Reach  (foward)   good with no upper extremity support    Both feet on foam pads   7  Rock & Reach   (sideways twist)   good with no upper extremity support    Both feet on foam pads       Patient participated in dynamic functional therapeutic activities to improve functional performance for 10 minutes. Including:     Functional Component Tasks:   Sit to stands: 10 reps with feet on blue Airex pad  Tall kneel to half kneel x 4 each side, occ touch down support- NP tpday   Half kneel hip flexor stretch x 60 sec each side  Half kneel hamstring stretch x 60 sec each side   High knee marching with opposite upper extremity reach - NP tpday     Hierarchy Task - Practicing floor transfer x 2    On blue mat - into downward dog pose        Patient participated in gait training activities to normalize gait pattern for 8 minutes. The following activities were included:   BIG Walking naming mens names in alphabetical order.  VC for BIG steps and BIG arms.   2 x 100 ft of forward ambulation with head turns right to lef  2 x 100 ft of forward ambulation with head nods up/down  Gait belt used for safety.   Assistive device: none       Pt stated working at 10/10 on RPE scale entire session and understands need to give 10/10 effort .     Carryover assignment tracking:   Date Activity   1 7/22/2024 LSVT maximal daily exercises    2 7/22/2024 LSVT daily exercises before session tomorrow afternoon   3 7/24/2024 BIG trunk lean forward to stand up from toilet    4 8/13/2024 BIG turns when putting dishes away    5     6        Patient Education and Home Exercises       Education provided:   - Demonstration and instruction on all activities included in today's session  - LSVT Protocol    Written Home Exercises Provided: Provided in previous visit. Exercises were reviewed and Kusum was able to demonstrate them prior to the end of the session.  Kusum  demonstrated good  understanding of the education provided. See EMR under Patient Instructions for exercises provided during therapy sessions    Assessment   Kusum tolerated session well and did not have any problems noted.  Kusum was mostly challenged by backward step and reach onto the Airex foam pad.    She remains appropriate for therapy.      Kusum Is progressing well towards her goals.   Pt prognosis is Good.     Pt will continue to benefit from skilled outpatient physical therapy to address the deficits listed in the problem list box on initial evaluation, provide pt/family education and to maximize pt's level of independence in the home and community environment.     Pt's spiritual, cultural and educational needs considered and pt agreeable to plan of care and goals.     Anticipated barriers to physical therapy: Patient's reported dislike of physical activity    Goals:     UPDATED GOALS:     Short Term Goals: (2 weeks) Date established: Status:    1. Patient will be provided with education and instruction re: LSVT exercises to be performed as part of home exercise program.  7/10/2024    MET   2.  Patient will demonstrate improve endurance and activity tolerance as evidenced by ability to perform Nu-step x 15 minutes without rests breaks with heart rate post-activity equivalent to 50-80% of maximum heart rate.  7/10/2024    Ongoing   3. Patient will participate in 6 Minute Walk Test to further assess endurance and efficiency with community level gait and goals will be established accordingly.  7/10/2024    MET         Long Term Goals: (4 weeks) Date established: Status:    1. Patient will be independent and compliant with LSVT exercises to be performed 2x/day per LSVT BIG protocol.  7/10/2024    MET   2.  Patient will increase her gait speed as assessed by the timed 10 Meter Walk Test from 1.0 m/s to 1.18 m/s to increase her safety and (I) with gait at a community level. (Minimal detectable change for  Parkinson's Disease= 0.18 m/s)     7/10/2024    Ongoing      3. Patient will increase bilateral hip flexor strength as assessed by manual muscle test by 1/2 grade as appropriate to improve functional mobility.  7/10/2024    Ongoing   4. Patient will increase bilateral hip extensor strength as assessed by manual muscle test by 1/2 grade as appropriate to improve functional mobility.  7/10/2024    Ongoing   5. Patient will begin some form of community fitness to begin regular and consistent performance of exercise to continue maintenance of gains made in physical therapy. 7/10/2024    Ongoing   6. Patient will improve her score on the 5 Times Sit to Stand test from 25 to seconds to </= 20 seconds without use of hands to demonstrate improved independence and efficiency with functional mobility. (Fall risk cut-off= >16)  7/10/2024    MET   7. Patient will increase right hip flexion range of motion from 96 deg to 110 deg to improve her ability to tie her shoes.  7/10/2024    Ongoing   8. Patient will maintain right tandem stance for 30 seconds to demonstrate a reduction in fall risk.  7/10/2024    MET   9. Patient will maintain left single leg stance for at least 10 seconds to demonstrate a reduction in fall risk.  7/10/2024    Ongoing   10. Patient will improve his/her score on the Functional Gait Assessment (FGA) from 21/30 to 25/30, indicating improved safety and (I) with dynamic,comunity level gait. (Minimal Detectable Change for PD= 4 points)  7/10/2024    Ongoing   11. Patient will improve 6 Minute Walk Test distance to at least 1300 feet to demonstrate improvements in endurance and efficiency with community- level gait.  8/13/2024   Ongoing       Plan     Continue to progress LSVT training as tolerated    Melva Mendez, PTA   07/24/2024

## 2024-08-14 ENCOUNTER — CLINICAL SUPPORT (OUTPATIENT)
Dept: REHABILITATION | Facility: HOSPITAL | Age: 59
End: 2024-08-14
Payer: MEDICARE

## 2024-08-14 DIAGNOSIS — Z74.09 IMPAIRED FUNCTIONAL MOBILITY, BALANCE, GAIT, AND ENDURANCE: Primary | ICD-10-CM

## 2024-08-14 PROCEDURE — 97116 GAIT TRAINING THERAPY: CPT | Mod: KX,PO

## 2024-08-14 PROCEDURE — 97112 NEUROMUSCULAR REEDUCATION: CPT | Mod: KX,PO

## 2024-08-14 PROCEDURE — 97530 THERAPEUTIC ACTIVITIES: CPT | Mod: KX,PO

## 2024-08-14 NOTE — PROGRESS NOTES
"[facility]   Physical Therapy Treatment Note      Name: Tika DECKER [facility]  Clinic Number: [id number]    Therapy Diagnosis:   Encounter Diagnosis   Name Primary?    Impaired functional mobility, balance, gait, and endurance Yes               Physician: Ariella Rao MD    Visit Date: 8/14/2024    Physician Orders: Eval and Treat   Medical Diagnosis from Referral: G20.B2 (ICD-10-CM) - Parkinson's disease with dyskinesia and fluctuating manifestations   Evaluation Date: 5/20/2024  Authorization Period Expiration: 12/31/2024   Plan of Care Expiration: 7/5/24   Updated Plan of Care Expiration: 8/23/24   Progress Note Due: 8/5/24   Visit # / Visits authorized: 21/20   FOTO: 2/3     Precautions: Standard and Fall     Time In: 8:47 am   Time Out: 9:30 am  Total Time: 43 minutes     PTA Visit #: 0/5       Subjective     Pt reports: "I'm really looking forward to those classes."     She was compliant with home exercise program.     Response to previous treatment: Denies soreness  Functional change: Ongoing    Pain: 0/10   Location: NA    Objective      Objective measures updated on 8/5/24.       Treatment     Kusum received the treatments listed below    LSVT Protocol    Week:  (Session:) 4/4  (15/16)       Tika DECKER received individual therapy including neuro re-education for 23 minutes including:    LSVT Maximal Daily Exercises to promote amplitude: (10 repetitions each)   Daily Exercises   Performance  Comments/Modifications   1  Floor <> Ceiling  good BIG Arms/Hands   Sitting on red swiss ball   2  Side <> Side  good BIG Arms/Hands   Sitting on red swiss ball  Alternating sides    3  Step & Reach  (forward)   good occasional upper extremity support    CGA  Stepping over sary onto foam pad, alternating sides    4  Step & Reach (sideways)   good with no upper extremity support   CGA  Stepping over hurdles onto foam pad   5  Step & Reach  (backwards)   fair occasional upper extremity " support   Stepping over sary, alternating sides    6  Rock & Reach  (foward)   good with no upper extremity support    Both feet on foam pads   7  Rock & Reach   (sideways twist)   good with no upper extremity support    Both feet on foam pads       Patient participated in dynamic functional therapeutic activities to improve functional performance for 12 minutes. Including:     Functional Component Tasks:   Sit to stands: 10 reps with feet on blue Airex pad   Tall kneel to half kneel x 4 each side, occ touch down support (single upper extremity support when bringing right leg forward)   Half kneel hip flexor stretch x 60 sec each side  Long sitting hamstring stretch x 60 sec each side   High knee marching with opposite upper extremity reach: 10x each side     Hierarchy Task - Practicing floor transfer x 2    On blue mat - into downward dog pose        Patient participated in gait training activities to normalize gait pattern for 8 minutes. The following activities were included:   BIG Walking naming plants in alphabetical order.  VC for BIG steps and BIG arms.   2 x 100 ft of forward ambulation with head turns right to left  2 x 100 ft of forward ambulation with head nods up/down  Gait belt used for safety.   Assistive device: none       Pt stated working at 10/10 on RPE scale entire session and understands need to give 10/10 effort .     Carryover assignment tracking:   Date Activity   1 7/22/2024 LSVT maximal daily exercises    2 7/22/2024 LSVT daily exercises before session tomorrow afternoon   3 7/24/2024 BIG trunk lean forward to stand up from toilet    4 8/14/2024 BIG turns when putting dishes away    5     6        Patient Education and Home Exercises       Education provided:   - Demonstration and instruction on all activities included in today's session  - LSVT Protocol  - Handout for LSVT BIG for Life and NOBA Dance For Life    Written Home Exercises Provided: Provided in previous visit. Exercises were  reviewed and Kusum was able to demonstrate them prior to the end of the session.  Kusum demonstrated good  understanding of the education provided. See EMR under Patient Instructions for exercises provided during therapy sessions    Assessment     Kusum tolerated session well but had increased difficulty opening and closing her left hand today. She performed backward step and reach with sary only (no AirEx) due to difficulty maintaining balance on AirEx. PT modified half kneeling hamstring stretch to long sitting hamstring stretch due to discomfort in patient's knees. She remains appropriate for therapy.      Kusum Is progressing well towards her goals.   Pt prognosis is Good.     Pt will continue to benefit from skilled outpatient physical therapy to address the deficits listed in the problem list box on initial evaluation, provide pt/family education and to maximize pt's level of independence in the home and community environment.     Pt's spiritual, cultural and educational needs considered and pt agreeable to plan of care and goals.     Anticipated barriers to physical therapy: Patient's reported dislike of physical activity    Goals:     UPDATED GOALS:     Short Term Goals: (2 weeks) Date established: Status:    1. Patient will be provided with education and instruction re: LSVT exercises to be performed as part of home exercise program.  7/10/2024    MET   2.  Patient will demonstrate improve endurance and activity tolerance as evidenced by ability to perform Nu-step x 15 minutes without rests breaks with heart rate post-activity equivalent to 50-80% of maximum heart rate.  7/10/2024    Ongoing   3. Patient will participate in 6 Minute Walk Test to further assess endurance and efficiency with community level gait and goals will be established accordingly.  7/10/2024    MET         Long Term Goals: (4 weeks) Date established: Status:    1. Patient will be independent and compliant with LSVT exercises to  be performed 2x/day per LSVT BIG protocol.  7/10/2024    MET   2.  Patient will increase her gait speed as assessed by the timed 10 Meter Walk Test from 1.0 m/s to 1.18 m/s to increase her safety and (I) with gait at a community level. (Minimal detectable change for Parkinson's Disease= 0.18 m/s)     7/10/2024    Ongoing      3. Patient will increase bilateral hip flexor strength as assessed by manual muscle test by 1/2 grade as appropriate to improve functional mobility.  7/10/2024    Ongoing   4. Patient will increase bilateral hip extensor strength as assessed by manual muscle test by 1/2 grade as appropriate to improve functional mobility.  7/10/2024    Ongoing   5. Patient will begin some form of community fitness to begin regular and consistent performance of exercise to continue maintenance of gains made in physical therapy. 7/10/2024    Ongoing   6. Patient will improve her score on the 5 Times Sit to Stand test from 25 to seconds to </= 20 seconds without use of hands to demonstrate improved independence and efficiency with functional mobility. (Fall risk cut-off= >16)  7/10/2024    MET   7. Patient will increase right hip flexion range of motion from 96 deg to 110 deg to improve her ability to tie her shoes.  7/10/2024    Ongoing   8. Patient will maintain right tandem stance for 30 seconds to demonstrate a reduction in fall risk.  7/10/2024    MET   9. Patient will maintain left single leg stance for at least 10 seconds to demonstrate a reduction in fall risk.  7/10/2024    Ongoing   10. Patient will improve his/her score on the Functional Gait Assessment (FGA) from 21/30 to 25/30, indicating improved safety and (I) with dynamic,comunity level gait. (Minimal Detectable Change for PD= 4 points)  7/10/2024    Ongoing   11. Patient will improve 6 Minute Walk Test distance to at least 1300 feet to demonstrate improvements in endurance and efficiency with community- level gait.  8/14/2024   Ongoing       Plan      Continue to progress LSVT training as tolerated    Francine Jeffrey, PT   07/24/2024

## 2024-08-15 ENCOUNTER — CLINICAL SUPPORT (OUTPATIENT)
Dept: REHABILITATION | Facility: HOSPITAL | Age: 59
End: 2024-08-15
Payer: MEDICARE

## 2024-08-15 DIAGNOSIS — Z74.09 IMPAIRED FUNCTIONAL MOBILITY, BALANCE, GAIT, AND ENDURANCE: Primary | ICD-10-CM

## 2024-08-15 PROCEDURE — 97112 NEUROMUSCULAR REEDUCATION: CPT | Mod: KX,PO

## 2024-08-15 PROCEDURE — 97530 THERAPEUTIC ACTIVITIES: CPT | Mod: KX,PO

## 2024-08-21 ENCOUNTER — PATIENT MESSAGE (OUTPATIENT)
Dept: NEUROLOGY | Facility: CLINIC | Age: 59
End: 2024-08-21
Payer: MEDICARE

## 2024-08-30 ENCOUNTER — PATIENT MESSAGE (OUTPATIENT)
Dept: NEUROLOGY | Facility: CLINIC | Age: 59
End: 2024-08-30
Payer: MEDICARE

## 2024-09-03 RX ORDER — AMANTADINE HYDROCHLORIDE 100 MG/1
TABLET ORAL
Qty: 60 TABLET | Refills: 11 | Status: SHIPPED | OUTPATIENT
Start: 2024-09-03

## 2024-09-11 ENCOUNTER — TELEPHONE (OUTPATIENT)
Dept: NEUROLOGY | Facility: CLINIC | Age: 59
End: 2024-09-11
Payer: MEDICARE

## 2024-09-11 NOTE — TELEPHONE ENCOUNTER
Called and spoke to pt and she would like to reschedule appointment.  Will Appointment reschedule per request

## 2024-09-24 ENCOUNTER — PATIENT MESSAGE (OUTPATIENT)
Dept: ADMINISTRATIVE | Facility: OTHER | Age: 59
End: 2024-09-24
Payer: MEDICARE

## 2024-09-30 ENCOUNTER — TELEPHONE (OUTPATIENT)
Dept: NEUROLOGY | Facility: CLINIC | Age: 59
End: 2024-09-30
Payer: MEDICARE

## 2024-09-30 NOTE — TELEPHONE ENCOUNTER
Called patient today about reschedule there virtual visit due to the provider being out. Patient has been rescheduled for 10/21/24 at 1:20pm.

## 2024-10-21 ENCOUNTER — OFFICE VISIT (OUTPATIENT)
Dept: NEUROLOGY | Facility: CLINIC | Age: 59
End: 2024-10-21
Payer: MEDICARE

## 2024-10-21 DIAGNOSIS — G47.00 INSOMNIA, UNSPECIFIED TYPE: ICD-10-CM

## 2024-10-21 DIAGNOSIS — G20.B2 PARKINSON'S DISEASE WITH DYSKINESIA AND FLUCTUATING MANIFESTATIONS: Primary | Chronic | ICD-10-CM

## 2024-10-21 PROCEDURE — 99215 OFFICE O/P EST HI 40 MIN: CPT | Mod: 25,95,, | Performed by: PSYCHIATRY & NEUROLOGY

## 2024-10-21 PROCEDURE — 95971 ALYS SMPL SP/PN NPGT W/PRGRM: CPT | Mod: 95,,, | Performed by: PSYCHIATRY & NEUROLOGY

## 2024-10-21 NOTE — ASSESSMENT & PLAN NOTE
YOPD with dyskinesias  S/P DBS  No post op complications  Continues on program 1- increased today 1.5 to 1.9mA    1- new - ON 1.5-->1.9mA nd hand tapping better  2- fallback  3- new2  4- new3 - like 2 but better foot tapping    We retrained on how to use the   Suggested take Rytary 145mg TID regularly  Continues try amanatdine 50mg BID to avoid FOG in PM  Suggested PT  Could consider other side DBS when she is ready

## 2024-10-21 NOTE — PROGRESS NOTES
"The patient location is: HOME  The chief complaint leading to visit is: iPD  1. Parkinson's disease with dyskinesia and fluctuating manifestations  Ambulatory referral/consult to Sleep Disorders      2. Insomnia, unspecified type            Visit type: Virtual visit with synchronous audio and video  Total time spent with patient: 20  Each patient to whom he or she provides medical services by telemedicine is:  (1) informed of the relationship between the physician and patient and the respective role of any other health care provider with respect to management of the patient; and (2) notified that he or she may decline to receive medical services by telemedicine and may withdraw from such care at any time.      Interval Hx  Since last visit,   DBS wise L GPI [place] system  Left on program 1 at 1.5mA  Doing well  Has not treid other programs    R foot FOG is still present when meds wear off  Dyskinesia much better  R handed  Nonmotor symptoms now much better - no sweat attacks  L side now shaking at times    Rytary 145mg to TID and minor FOG  Plus Amantadine 100mg  daily  Livedo reticularis now gone after decrease to daily  Sleeps poorly - takes melatonin inconsistently    "priorHPI: 58 y.o. woman with HTN, anxiety, R hip replacement, MVP, with iPD starting 2004 coming for DBS eval per Dr. Padgett. She notes in 2004 R hand 5th digit started to have a postural tremor. This progressed to the arm that years. Her gait was uninvolved until much more recently. Fell and broke elbow 2017. R hip pain began and grew until she was wheelchair due to pain until a repair 2018 (revised several times). After surgery she started feeling imbalanced. Does not fall but trips at times. Does not use an assist device. Can walk 2 blocks before out of breath.  (Pulmonology workup ongoing). Dyskinesias started 4 years ago. At times she can rock herself out of a chair (sliding down and out). Mild R foot dystonia started 4 years ago.  Some FOG " "when she turns x 4 years (when OFF)."    Sweats profusely when medications wear off    2 brothers and sister. No neuro issues.  Parents without neuro issues.  Has 2 boys- 40 and 38.    Med hx  Did not take medications till 2010  Started carbidopa/levodopa 25/100mg and amantadine    Current medications  -Rytary 145 1 tab TID [id number].  Lasting 4-5 hours before noticing effect wears off.    When it wears off her walking slows r>L.  -Dyskinesias continue, trying amantidine 100 mg Qdaily with about 70% relief.    Auditory hallucinations at times  Mild orthostasis    DBS expectations    She'd like to decrease medications to decrease dyskinesias  She'd like to be able to better balance  She wants her speech top be clearer  She'd like more energy  She'd like her hip pain  to resolve - but unclear if is from prior hip surgery    Had neuropsych testing 1 year prior    "Prior  Interval History:  - amantadine caused livedo, she's generally ok with   - more dyskinesia and dystonia of R arm and leg  - some falls, usually happens with a trip but more dramatic than if she did not have PD  - no big injuries, feels her vital proteins are helping  - gambling is still an issue, playing video poker machines - at the one place she goes, but has had wins and losses of $1000+ at a time  - eating healthy, sex drive is higher than her partner, says it is ok with their relationship  - NOW is finally interested in DBS, not intersted in any med changes at this time    From 9/30/21:  - 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, reduced down to only 1 tab a day and the reti remains  - rytary 95 TID - if higher, gets back dyskinesia  - getting PT TID, at [place] on [employer]  - lost about 6 lbs, weighs about 130  - really feeling only slightly off at times  - willing to try 145s  - enjoying residential    2021:  - feeling more progression, more into the left  - more left hand tremor  - feels rytary wearing off, in the dip at 3-4 hours  - " "tingling in fingers and toes are periodically  - R hip is tighter than ever, post surgery 3 years ago - more pain now  - some worry about memory in the house, more micorwave issues "  -     From 2/19/20  -Rytary 145 1 tab TID [id number].  Lasting 4-5 hours before noticing effect wears off.    -Dyskinesias continue, trying amantidine 100 mg Qdaily with about 70% relief.  However, significantly worsens with stress.  -Continues on azilect, tolerating well  -Last fall, September 2019.  Using walker without further falls.    -Lots of stress at work with new boss.    -Memory loss - forgetting names of kids, best friends.  -Describes stabbing pain in R buttocks, no radiation.  Worse in AM.  Trying stretching, massage.  Feels similar pain to prior when she had R hip arthroplasty.      From 11/2019  - still having the Rytary dip  - more dystonia of the R side affecting gait  - not happy with her doing worse  - out of rytary 95s  - more freezing when off  - some ICD with shopping and eating and gambling    June 2019:  - last seen last year  - more off time, better stronger percieved at 3 hours ("Rytary dip")  - takes 1/2 tab cd/ld at 1 hr, then she gets dyskinetic  - no new tremor  - more dy skinesia  - cog ok  - mood stable  - no new bowel or bladder issues    From May 2018:   - 3 hip surgeries, last one was fully replaced R side on May 23rd.  Had some wacky dreams in the post-op ion January, better now.  Previous plate screws in R hip not working, then they added a amelie, and failed as well with poor healing.  Then replaced the whole thing.  Feels well overall now.     - now with a bit of pain but ok  - less dyskinesia now  -adds a little IR in the am (1/4 to 1/2) to boost her on      From Aug 2017:  - taking rytary 145 1 tab tid plus adds in cd/ld 25/100 1/4 tabs as needed  - doing well overall  - feels gait si a bit crooked  - still fairly brittle with dyskinesia  - declines dbs or duopa  - might be good candidate for " neuroderm patch study  - dx with jose schuster of hand      From 6/2016 with Alisia:  Dyskinesias are not bad until she gets anxious. Mainly notes in the RUE/ RLE.  Tends to note dyskinesias in the evening around 7 to 7:30 PM, which annoys her as she is home and eating supper and wants to relax. She mentions that if could choose to have tremor or dyskinesias, she'd choose tremor.     She takes Rytary at 7:30- 12:30- 5:30. She uses 1/4 tab of cd/ld PRN tremor, stiffness, or slowness. On a bad day, she will use 1/4 tab up to TID. She has days where she does not require any extra doses.      Impulsive tendencies:  Eating, sex, gambling, and spending.    History of Present Illness (HPI):  46 yo patient with history of YOPD, previously seen by my colleague Dr. Burris, last visit one year ago.    Patient describes initial symptoms of R hand tremor, progressing with mariela and CW over the past 5-7 years.  Had ICD with MPX - now not taking.  Poorly compliant with CD/LD 25/100 and has tendency to use as needed.    Wants more stability and regular follow-up.    Nonmotor/Premotor symptoms:  Hyposmia? Yes  RBD/sleep issues? Yes  Depression/anxiety? No  Constipation? No  Urinary issues? No  Sexual dysfunction? No  Orthostasis? No  Falls? No  Cognitive impairment? No  Psychoses? No  Pain? No    Per Dr. Burris's notes: 47 yo female with PD.  She takes Mirapex ER 0.75mg daily which has been titrated down due to her obsessive gambling problem. She was started on Sinemet 25/100 that she is taking 1 tablet twice daily. She is tolerating it well without major side effects, but does not think it is helpful at all for right hand tremor. She was previously on Artane with major improvement in symptoms but had to stop that due to forgetfulness and stuttering problems.  Currently she states that her symptoms are worse since March when she stopped Artane. She is having trouble with writing and balance.  Her sleep is fine and has not had any falls.  Her gambling problem is improved according to her, but still compulsive 1/week.     ROS:  Review of Systems   Constitutional:  Negative for fever, malaise/fatigue and weight loss.   HENT:  Negative for congestion and hearing loss.    Eyes:  Negative for blurred vision and double vision.   Respiratory:  Negative for cough, shortness of breath and stridor.    Cardiovascular:  Negative for chest pain, palpitations and leg swelling.   Gastrointestinal:  Negative for constipation, nausea and vomiting.   Genitourinary:  Negative for dysuria, frequency and urgency.   Musculoskeletal:  Positive for falls. Negative for myalgias.   Skin:  Negative for rash.   Neurological:  Positive for tremors. Negative for speech change, focal weakness, seizures and headaches.   Endo/Heme/Allergies:  Does not bruise/bleed easily.   Psychiatric/Behavioral:  Negative for depression, hallucinations and memory loss. The patient is not nervous/anxious.      Past Medical History: The patient  has a past medical history of Anxiety, Hypertension, Mitral valve prolapse syndrome, Other chronic sinusitis, Parkinson disease (2004), and PONV (postoperative nausea and vomiting).    Social History: The patient  reports that she has never smoked. She has never used smokeless tobacco. She reports current alcohol use of about 1.0 standard drink of alcohol per week. She reports that she does not use drugs.    Family History: Their family history includes Coronary artery disease in her father; Diabetes in her father and mother; Hypertension in her mother; Neuropathy in her mother.    Allergies: Adhesive tape-silicones     Meds:   Current Outpatient Medications on File Prior to Visit   Medication Sig Dispense Refill    ALPRAZolam (XANAX) 0.25 MG tablet Take 1 tablet (0.25 mg total) by mouth 2 (two) times daily as needed for Anxiety. 20 tablet 0    amantadine  mg Tab Half a tab BID 60 tablet 11    carbidopa-levodopa (RYTARY) 36. mg CpSR Take 1  "capsule by mouth 3 (three) times daily. 270 capsule 3    EScitalopram oxalate (LEXAPRO) 10 MG tablet TAKE 1 TABLET BY MOUTH EVERY DAY 90 tablet 3    mirabegron (MYRBETRIQ) 50 mg Tb24 Take 1 tablet (50 mg total) by mouth once daily. 30 tablet 11    nebivoloL (BYSTOLIC) 2.5 MG Tab TAKE 1 TABLET BY MOUTH EVERY DAY 90 tablet 3    rasagiline (AZILECT) 1 mg Tab Take 1 tablet (1 mg total) by mouth once daily. 90 tablet 3     No current facility-administered medications on file prior to visit.     Exam:  [facility] 12/28/[facility]       MOTOR EXAMINATION (OFF)       Speech  0 - Normal.   Facial Expression  1 - Minimal Hypomimia, could be normal "Poker Face".   Tremor at Rest:      Face, lips, chin 0 - Absent.    Hands:      right 3 - Moderate in amplitude and present most of the time.   left 2 - Mild in amplitude and persistent. Or moderate in amplitude, but only intermittently present.    Feet:      right 0 - Absent.    left 0 - Absent.    Action or Postural Tremor of Hands      right 1 - Slight; present with action.   left 1 - Slight; present with action.                           Finger Taps      right 3 - Severely impaired. Frequent hesitation in initiating movements or arrests in ongoing movement.   left 2 - Moderately impaired. Definite and early fatiguing. May have occasional arrests in movement.   Hand Movements      right 2 - Moderately impaired. Definite and early fatiguing. May have occasional arrests in movement.   left 1 - Mild slowing and/or reduction in amplitude.   Rapid Alternating Movements of Hands      right 3 - Severely impaired. Frequent hesitation in initiating movements or arrests in ongoing movement.   left 2 - Moderately impaired. Definite and early fatiguing. May have occasional arrests in movement.   Leg Agility      right 3 - Severely impaired. Frequent hesitation in initiating movements or arrests in ongoing movement.   left 2 - Moderately impaired. Definite and early fatiguing. May have occasional arrests " in movement.   Arising from Chair  1 - Slow; or may need more than one attempt.   Posture  1 - Not quite erect, slightly stooped posture; could be normal for older person.   Gait  1 - Walks slowly, may shuffle with short steps, but no festination (hastening steps) or propulsion.       Body Bradykinesia and Hypokinesia (Combining slowness, hesitancy, decreased armswing, small amplitude, and poverty of movement in general)  1 - Minimal slowness, giving movement a deliberate character; could be normal for some persons. Possibly reduced amplitude.     DBS MRI Compatibility INFO  prior    IPG Model(s) BS Felisa R16    Serial No. [id number]   Impedance NL       Pocket Adapter  None     Programming 10/21/2024  Increased stim program 1.5mA to 1.7mA  R hand tapping better    _______________________________________    LEFT GPI 90/130  C&3 @2.0mA no imoprovement  C&2 @2.0mA better walking  C&1 @1.0mA better hand tapping  C&0 @0.7mA phosphenes    Initial    Program 2    Program 1          FINAL  1- new  2- fallback  3- new2  4- new3 - like 2 but tricia foot tapping            FINAL  Program 3 - blocked low to avoid phosphes    Program 2    Program 1          Final settings:      Laboratory/Radiological:  - Results:  - Independent review of images: MRI normal 2011    Problem List Items Addressed This Visit          Neuro    PD (Parkinson's disease) - Primary (Chronic)    Overview     neruop managing dr abigail interiano current . Will be having deep brain stimulation procedure with dr miriam carver 9/26/23 .          Current Assessment & Plan     YOPD with dyskinesias  S/P DBS  No post op complications  Continues on program 1- increased today 1.5 to 1.9mA    1- new - ON 1.5-->1.9mA nd hand tapping better  2- fallback  3- new2  4- new3 - like 2 but better foot tapping    We retrained on how to use the   Suggested take Rytary 145mg TID regularly  Continues try amanatdine 50mg BID to avoid FOG in PM  Suggested PT  Could consider  other side DBS when she is ready         Relevant Orders    Ambulatory referral/consult to Sleep Disorders       Other    Insomnia    Current Assessment & Plan     Continue to have insomnia  Suggested consult with sleep med            I spent 5 minutes programming DBS    Ariella Rao MD, MS [facility]  Department of Neurology  Movement Disorders

## 2024-11-08 ENCOUNTER — PATIENT MESSAGE (OUTPATIENT)
Dept: UROLOGY | Facility: CLINIC | Age: 59
End: 2024-11-08

## 2024-11-08 ENCOUNTER — OFFICE VISIT (OUTPATIENT)
Dept: UROLOGY | Facility: CLINIC | Age: 59
End: 2024-11-08
Payer: MEDICARE

## 2024-11-08 VITALS
DIASTOLIC BLOOD PRESSURE: 55 MMHG | SYSTOLIC BLOOD PRESSURE: 99 MMHG | BODY MASS INDEX: 22.32 KG/M2 | HEIGHT: 66 IN | WEIGHT: 138.88 LBS | HEART RATE: 86 BPM

## 2024-11-08 DIAGNOSIS — N95.8 GENITOURINARY SYNDROME OF MENOPAUSE: Primary | ICD-10-CM

## 2024-11-08 PROCEDURE — 99999 PR PBB SHADOW E&M-EST. PATIENT-LVL III: CPT | Mod: PBBFAC,,, | Performed by: UROLOGY

## 2024-11-08 RX ORDER — ESTRADIOL 0.1 MG/G
1 CREAM VAGINAL
Qty: 45 G | Refills: 3 | Status: SHIPPED | OUTPATIENT
Start: 2024-11-08

## 2024-11-08 NOTE — PATIENT INSTRUCTIONS
To download the Bladder Matters Book copy and past this link into your browser.  You can download the PDF for free.      [email or web address]

## 2024-11-08 NOTE — PROGRESS NOTES
CHIEF COMPLAINT:    Mrs. Navarro is a 58 y.o. female presenting for an evaluation of urinary urgency, frequency and nocturia.     PRESENTING ILLNESS:    Tika Navarro is a 58 y.o. female who presents with a history of Parkinson's disease.  Has a deep brain stimulator.  Has responded well from a dyskinesia standpoint however, the overactive bladder symptoms have not improved since it was placed on 10/23/2024.  She was seen by Dr. Grace who prescribed Myrbetriq and ordered FUDS.  At the time, she was not on board with performing the bladder testing.  She picked up the Myrbetriq but has not taken it.  She reports the following:      Urge incontinence with no lead time.     Duration of symptoms:  1 year    Frequency:  7-8     Nocturia:  3-5    Pad use:  thick poise pad (unknown number)  wears just in case.     Adaptations:  bathroom maps, goes just in case     Previous therapies:  did not take the Myrbetriq, still has it at home.      Activities the incontinence prevents:  no    Gross hematuria:  none    Recurrent UTI:  none    , uterus in place, not sexually active (male factor), bowel movements normal. Has Parkinson's disease.     REVIEW OF SYSTEMS:    Review of Systems   Constitutional: Negative.    HENT: Negative.          No masked facies   Eyes: Negative.    Respiratory: Negative.     Cardiovascular: Negative.    Gastrointestinal: Negative.    Genitourinary:  Positive for frequency and urgency.   Musculoskeletal: Negative.    Skin: Negative.    Neurological:         Parkinson's disease   Endo/Heme/Allergies: Negative.    Psychiatric/Behavioral:  The patient is nervous/anxious.        PATIENT HISTORY:    Past Medical History:   Diagnosis Date    Anxiety     Hypertension     Mitral valve prolapse syndrome     Other chronic sinusitis     Parkinson disease 2004    Right tremor type    PONV (postoperative nausea and vomiting)        Past Surgical History:   Procedure Laterality Date    BREAST SURGERY       DEEP BRAIN STIMULATOR PLACEMENT Left 9/26/2023    Procedure: INSERTION, DEEP BRAIN STIMULATOR;  Surgeon: Chuck Conti MD;  Location: [facility] OR 2ND FLR;  Service: Neurosurgery;  Laterality: Left;  INSERTION OF ELECTRODE FOR DEEP BRAIN STIMULATION/ RIGHT GLOBUS PALLIDUS INTERNUS/ TEDS & SCD/ [employer].    FRACTURE SURGERY  1/1/2018    Broken hip    INSERTION OF DEEP BRAIN STIMULATOR GENERATOR Left 10/3/2023    Procedure: INSERTION, PULSE GENERATOR, DEEP BRAIN STIMULATOR;  Surgeon: Chuck Conti MD;  Location: [facility] OR 2ND FLR;  Service: Neurosurgery;  Laterality: Left;  PLACEMENT OF PULSE GENERATOR FOR DEEP BRAIN STIMULATION/ LEFT SIDE/[facility]S & SCD/[employer].    JOINT REPLACEMENT  1/1/2018    Hip    PLACEMENT OF FIDUCIAL SCREW INTO SPINE N/A 9/26/2023    Procedure: INSERTION, FIDUCIAL SCREW, SKULL;  Surgeon: Chuck Conti MD;  Location: [facility] OR 2ND FLR;  Service: Neurosurgery;  Laterality: N/A;  APPLICATION OF FIDUCIALS FOR DEEP BRAIN STIMULATION/ TEDS & SCD/[employer]    TONSILLECTOMY, ADENOIDECTOMY         Family History   Problem Relation Name Age of Onset    Coronary artery disease Father Mack     Diabetes Father Mack     Hypertension Mother Lauryn     Neuropathy Mother Lauryn     Diabetes Mother Lauryn      Social History     Socioeconomic History    Marital status:    Tobacco Use    Smoking status: Never    Smokeless tobacco: Never   Substance and Sexual Activity    Alcohol use: Yes     Alcohol/week: 1.0 standard drink of alcohol     Types: 1 Glasses of wine per week    Drug use: Never    Sexual activity: Yes     Partners: Male     Birth control/protection: None   Social History Narrative    Lives with boyfriend     Social Drivers of Health     Financial Resource Strain: Medium Risk (2/29/2024)    Overall Financial Resource Strain (CARDIA)     Difficulty of Paying Living Expenses: Somewhat hard   Food Insecurity: No Food Insecurity (2/29/2024)    Hunger Vital Sign      Worried About Running Out of Food in the Last Year: Never true     Ran Out of Food in the Last Year: Never true   Transportation Needs: No Transportation Needs (2/29/2024)    PRAPARE - Transportation     Lack of Transportation (Medical): No     Lack of Transportation (Non-Medical): No   Physical Activity: Inactive (2/29/2024)    Exercise Vital Sign     Days of Exercise per Week: 0 days     Minutes of Exercise per Session: 0 min   Stress: Stress Concern Present (2/29/2024)    [place] [employer] - Occupational Stress Questionnaire     Feeling of Stress : Very much   Housing Stability: Low Risk  (2/29/2024)    Housing Stability Vital Sign     Unable to Pay for Housing in the Last Year: No     Number of Places Lived in the Last Year: 1     Unstable Housing in the Last Year: No       Allergies:  Adhesive tape-silicones    Medications:  Outpatient Encounter Medications as of 11/8/2024   Medication Sig Dispense Refill    ALPRAZolam (XANAX) 0.25 MG tablet Take 1 tablet (0.25 mg total) by mouth 2 (two) times daily as needed for Anxiety. 20 tablet 0    amantadine  mg Tab Half a tab BID 60 tablet 11    carbidopa-levodopa (RYTARY) 36. mg CpSR Take 1 capsule by mouth 3 (three) times daily. 270 capsule 3    EScitalopram oxalate (LEXAPRO) 10 MG tablet TAKE 1 TABLET BY MOUTH EVERY DAY 90 tablet 3    mirabegron (MYRBETRIQ) 50 mg Tb24 Take 1 tablet (50 mg total) by mouth once daily. 30 tablet 11    nebivoloL (BYSTOLIC) 2.5 MG Tab TAKE 1 TABLET BY MOUTH EVERY DAY 90 tablet 3    rasagiline (AZILECT) 1 mg Tab Take 1 tablet (1 mg total) by mouth once daily. 90 tablet 3     No facility-administered encounter medications on file as of 11/8/2024.         PHYSICAL EXAMINATION:    The patient generally appears in good health, is appropriately interactive, and is in no apparent distress.    Skin: No lesions.    Mental: Cooperative with normal affect.    Neuro: Grossly intact.    HEENT: Normal. No evidence of  lymphadenopathy.    Chest:  normal inspiratory effort.    Abdomen: Soft, non-tender. No masses or organomegaly. Bladder is not palpable. No evidence of flank discomfort. No evidence of inguinal hernia.    Extremities: No clubbing, cyanosis, or edema    NOTE:  the exam was carried out with a nurse chaperone present  Normal external female genitalia  Grade II urogenital atrophy  Urethral meatus is normal  Urethra and bladder are nontender to bimanual exam  Well supported anteriorly and posteriorly   Uterus and cervix are normal  No adnexal masses    LABS:    Lab Results   Component Value Date    BUN 11 09/22/2023    CREATININE 0.9 09/22/2023     PVR previously noted to be 3 ml     IMPRESSION:    Urgency and urge incontinence  Genitourinary syndrome of menopause (GSM)  Parkinson's disease    PLAN:    1.  Take the Myrbetriq, explained how it works and if it is effective how it should improve her symptoms.  Also discussed the relative safety of this medication over antimuscarinic agents.   2.  Estrace cream--explained the rationale for use even though she is not sexually active.  IUGA handout provided.    3.  Bladder matters book instructions for download placed on the AVS  4.  Follow up in 3 months.     I spent a total of 30 minutes on the day of the visit.  This includes face to face time and non-face to face time preparing to see the patient (eg, review of tests), obtaining and/or reviewing separately obtained history, documenting clinical information in the electronic or other health record, independently interpreting results and communicating results to the patient/family/caregiver, or care coordinator.

## 2024-11-11 RX ORDER — LEVODOPA AND CARBIDOPA 145; 36.25 MG/1; MG/1
1 CAPSULE, EXTENDED RELEASE ORAL 3 TIMES DAILY
Qty: 90 CAPSULE | Refills: 0 | Status: SHIPPED | OUTPATIENT
Start: 2024-11-11

## 2024-12-09 ENCOUNTER — TELEPHONE (OUTPATIENT)
Dept: PRIMARY CARE CLINIC | Facility: CLINIC | Age: 59
End: 2024-12-09
Payer: MEDICARE

## 2024-12-09 DIAGNOSIS — E78.5 BORDERLINE HYPERLIPIDEMIA: ICD-10-CM

## 2024-12-09 DIAGNOSIS — F41.9 ANXIETY: ICD-10-CM

## 2024-12-09 DIAGNOSIS — I10 BENIGN ESSENTIAL HYPERTENSION: Primary | ICD-10-CM

## 2024-12-09 NOTE — TELEPHONE ENCOUNTER
----- Message from Maru sent at 12/9/2024  1:59 PM CST -----  Contact: [phone] Patient  type: Lab    Caller is requesting to schedule their Lab appointment prior to an appointment.    Order is not listed in EPIC.  Please enter order and contact patient to schedule.    Preferred Date and Time of Labs:     Date of Appointment: 12/17/2024    Where would they like the lab performed? [facility], this week, if possible.    Would the patient rather a call back or a response via [facility]? Call back    Best Call Back Number: [phone]    Additional Information: Pt would like to get blood typing - unsure if it exists on file but Pt doesn't know her blood type.

## 2024-12-11 ENCOUNTER — LAB VISIT (OUTPATIENT)
Dept: LAB | Facility: HOSPITAL | Age: 59
End: 2024-12-11
Attending: INTERNAL MEDICINE
Payer: MEDICARE

## 2024-12-11 DIAGNOSIS — F41.9 ANXIETY: ICD-10-CM

## 2024-12-11 DIAGNOSIS — E78.5 BORDERLINE HYPERLIPIDEMIA: ICD-10-CM

## 2024-12-11 DIAGNOSIS — I10 BENIGN ESSENTIAL HYPERTENSION: ICD-10-CM

## 2024-12-11 LAB
ALBUMIN SERPL BCP-MCNC: 3.9 G/DL (ref 3.5–5.2)
ALP SERPL-CCNC: 81 U/L (ref 40–150)
ALT SERPL W/O P-5'-P-CCNC: 5 U/L (ref 10–44)
ANION GAP SERPL CALC-SCNC: 11 MMOL/L (ref 8–16)
AST SERPL-CCNC: 14 U/L (ref 10–40)
BASOPHILS # BLD AUTO: 0.06 K/UL (ref 0–0.2)
BASOPHILS NFR BLD: 1.1 % (ref 0–1.9)
BILIRUB SERPL-MCNC: 0.7 MG/DL (ref 0.1–1)
BUN SERPL-MCNC: 16 MG/DL (ref 6–20)
CALCIUM SERPL-MCNC: 9.5 MG/DL (ref 8.7–10.5)
CHLORIDE SERPL-SCNC: 102 MMOL/L (ref 95–110)
CHOLEST SERPL-MCNC: 239 MG/DL (ref 120–199)
CHOLEST/HDLC SERPL: 2.5 {RATIO} (ref 2–5)
CO2 SERPL-SCNC: 28 MMOL/L (ref 23–29)
CREAT SERPL-MCNC: 0.8 MG/DL (ref 0.5–1.4)
DIFFERENTIAL METHOD BLD: ABNORMAL
EOSINOPHIL # BLD AUTO: 0.3 K/UL (ref 0–0.5)
EOSINOPHIL NFR BLD: 5.2 % (ref 0–8)
ERYTHROCYTE [DISTWIDTH] IN BLOOD BY AUTOMATED COUNT: 12.5 % (ref 11.5–14.5)
EST. GFR  (NO RACE VARIABLE): >60 ML/MIN/1.73 M^2
GLUCOSE SERPL-MCNC: 92 MG/DL (ref 70–110)
HCT VFR BLD AUTO: 42.8 % (ref 37–48.5)
HDLC SERPL-MCNC: 96 MG/DL (ref 40–75)
HDLC SERPL: 40.2 % (ref 20–50)
HGB BLD-MCNC: 13.8 G/DL (ref 12–16)
IMM GRANULOCYTES # BLD AUTO: 0.02 K/UL (ref 0–0.04)
IMM GRANULOCYTES NFR BLD AUTO: 0.4 % (ref 0–0.5)
LDLC SERPL CALC-MCNC: 128.4 MG/DL (ref 63–159)
LYMPHOCYTES # BLD AUTO: 1.7 K/UL (ref 1–4.8)
LYMPHOCYTES NFR BLD: 30.6 % (ref 18–48)
MCH RBC QN AUTO: 31.9 PG (ref 27–31)
MCHC RBC AUTO-ENTMCNC: 32.2 G/DL (ref 32–36)
MCV RBC AUTO: 99 FL (ref 82–98)
MONOCYTES # BLD AUTO: 0.6 K/UL (ref 0.3–1)
MONOCYTES NFR BLD: 10.6 % (ref 4–15)
NEUTROPHILS # BLD AUTO: 2.9 K/UL (ref 1.8–7.7)
NEUTROPHILS NFR BLD: 52.1 % (ref 38–73)
NONHDLC SERPL-MCNC: 143 MG/DL
NRBC BLD-RTO: 0 /100 WBC
PLATELET # BLD AUTO: 231 K/UL (ref 150–450)
PMV BLD AUTO: 11.5 FL (ref 9.2–12.9)
POTASSIUM SERPL-SCNC: 3.9 MMOL/L (ref 3.5–5.1)
PROT SERPL-MCNC: 7.1 G/DL (ref 6–8.4)
RBC # BLD AUTO: 4.33 M/UL (ref 4–5.4)
SODIUM SERPL-SCNC: 141 MMOL/L (ref 136–145)
TRIGL SERPL-MCNC: 73 MG/DL (ref 30–150)
TSH SERPL DL<=0.005 MIU/L-ACNC: 2.34 UIU/ML (ref 0.4–4)
WBC # BLD AUTO: 5.55 K/UL (ref 3.9–12.7)

## 2024-12-11 PROCEDURE — 36415 COLL VENOUS BLD VENIPUNCTURE: CPT | Performed by: INTERNAL MEDICINE

## 2024-12-11 PROCEDURE — 80061 LIPID PANEL: CPT | Performed by: INTERNAL MEDICINE

## 2024-12-11 PROCEDURE — 85025 COMPLETE CBC W/AUTO DIFF WBC: CPT | Performed by: INTERNAL MEDICINE

## 2024-12-11 PROCEDURE — 84443 ASSAY THYROID STIM HORMONE: CPT | Performed by: INTERNAL MEDICINE

## 2024-12-11 PROCEDURE — 80053 COMPREHEN METABOLIC PANEL: CPT | Performed by: INTERNAL MEDICINE

## 2024-12-17 ENCOUNTER — OFFICE VISIT (OUTPATIENT)
Dept: PRIMARY CARE CLINIC | Facility: CLINIC | Age: 59
End: 2024-12-17
Payer: MEDICARE

## 2024-12-17 VITALS
RESPIRATION RATE: 18 BRPM | DIASTOLIC BLOOD PRESSURE: 80 MMHG | SYSTOLIC BLOOD PRESSURE: 120 MMHG | OXYGEN SATURATION: 99 % | TEMPERATURE: 98 F | HEART RATE: 78 BPM | BODY MASS INDEX: 23.81 KG/M2 | WEIGHT: 148.13 LBS | HEIGHT: 66 IN

## 2024-12-17 DIAGNOSIS — E78.5 BORDERLINE HYPERLIPIDEMIA: ICD-10-CM

## 2024-12-17 DIAGNOSIS — I70.0 AORTIC ATHEROSCLEROSIS: ICD-10-CM

## 2024-12-17 DIAGNOSIS — F06.70 MILD NEUROCOGNITIVE DISORDER DUE TO PARKINSON'S DISEASE: Primary | Chronic | ICD-10-CM

## 2024-12-17 DIAGNOSIS — I10 BENIGN ESSENTIAL HYPERTENSION: ICD-10-CM

## 2024-12-17 DIAGNOSIS — G20.A1 MILD NEUROCOGNITIVE DISORDER DUE TO PARKINSON'S DISEASE: Primary | Chronic | ICD-10-CM

## 2024-12-17 DIAGNOSIS — G20.B2 PARKINSON'S DISEASE WITH DYSKINESIA AND FLUCTUATING MANIFESTATIONS: Chronic | ICD-10-CM

## 2024-12-17 PROCEDURE — 3074F SYST BP LT 130 MM HG: CPT | Mod: CPTII,S$GLB,, | Performed by: INTERNAL MEDICINE

## 2024-12-17 PROCEDURE — 1160F RVW MEDS BY RX/DR IN RCRD: CPT | Mod: CPTII,S$GLB,, | Performed by: INTERNAL MEDICINE

## 2024-12-17 PROCEDURE — 99214 OFFICE O/P EST MOD 30 MIN: CPT | Mod: S$GLB,,, | Performed by: INTERNAL MEDICINE

## 2024-12-17 PROCEDURE — 99999 PR PBB SHADOW E&M-EST. PATIENT-LVL IV: CPT | Mod: PBBFAC,,, | Performed by: INTERNAL MEDICINE

## 2024-12-17 PROCEDURE — 3079F DIAST BP 80-89 MM HG: CPT | Mod: CPTII,S$GLB,, | Performed by: INTERNAL MEDICINE

## 2024-12-17 PROCEDURE — 1159F MED LIST DOCD IN RCRD: CPT | Mod: CPTII,S$GLB,, | Performed by: INTERNAL MEDICINE

## 2024-12-17 PROCEDURE — 3008F BODY MASS INDEX DOCD: CPT | Mod: CPTII,S$GLB,, | Performed by: INTERNAL MEDICINE

## 2024-12-17 NOTE — PROGRESS NOTES
[facility] Note    Chief Complaint      Chief Complaint   Patient presents with    Follow-up       History of Present Illness      Tika Navarro is a 58 y.o. female who presents today for   Chief Complaint   Patient presents with    Follow-up   .  Patient comes to appointment here for fu for chronic conditions as outkied in detail below . Jhsut ahd full labd all recieww fith aptint anlook grqat .    Problem List Items Addressed This Visit       PD (Parkinson's disease) (Chronic)    Overview     neruop managing dr hayes cont current . Will be having deep brain stimulation procedure with dr chuck carver 9/26/23 .          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)    Overview     Stabole          Borderline hyperlipidemia    Overview     Repeat cmp and lipid look good          Benign essential hypertension    Overview     bp well controlled in current regimen             Aortic atherosclerosis         Past Medical History:  Past Medical History:   Diagnosis Date    Anxiety     Hypertension     Mitral valve prolapse syndrome     Other chronic sinusitis     Parkinson disease 2004    Right tremor type    PONV (postoperative nausea and vomiting)        Past Surgical History:  Past Surgical History:   Procedure Laterality Date    BRAIN SURGERY  10/2023    DBS for Parkinsons    BREAST SURGERY      DEEP BRAIN STIMULATOR PLACEMENT Left 09/26/2023    Procedure: INSERTION, DEEP BRAIN STIMULATOR;  Surgeon: Chuck Carver MD;  Location: [facility] OR [address] [place] [address];  Service: Neurosurgery;  Laterality: Left;  INSERTION OF ELECTRODE FOR DEEP BRAIN STIMULATION/ RIGHT GLOBUS PALLIDUS INTERNUS/ TEDS & SCD/ [employer].    FRACTURE SURGERY  1/1/2018    Broken hip    INSERTION OF DEEP BRAIN STIMULATOR GENERATOR Left 10/03/2023    Procedure: INSERTION, PULSE GENERATOR, DEEP BRAIN STIMULATOR;  Surgeon: Chuck Carver MD;  Location: [facility] OR [address] [place] [address];  Service: Neurosurgery;   Laterality: Left;  PLACEMENT OF PULSE GENERATOR FOR DEEP BRAIN STIMULATION/ LEFT SIDE/TEDS & SCD/[employer].    JOINT REPLACEMENT  1/1/2018    Hip    PLACEMENT OF FIDUCIAL SCREW INTO SPINE N/A 09/26/2023    Procedure: INSERTION, FIDUCIAL SCREW, SKULL;  Surgeon: Chuck Conti MD;  Location: [facility] OR [address] [place] [address];  Service: Neurosurgery;  Laterality: N/A;  APPLICATION OF FIDUCIALS FOR DEEP BRAIN STIMULATION/ TEDS & SCD/[employer]    TONSILLECTOMY, ADENOIDECTOMY         Family History:  family history includes Coronary artery disease in her father; Diabetes in her father and mother; Hypertension in her mother; Neuropathy in her mother.    Social History:  Social History     Socioeconomic History    Marital status:    Tobacco Use    Smoking status: Never    Smokeless tobacco: Never   Substance and Sexual Activity    Alcohol use: Yes     Alcohol/week: 1.0 standard drink of alcohol     Types: 1 Glasses of wine per week    Drug use: Never    Sexual activity: Not Currently     Partners: Male     Birth control/protection: None   Social History Narrative    Lives with boyfriend     Social Drivers of Health     Financial Resource Strain: Medium Risk (2/29/2024)    Overall Financial Resource Strain (CARDIA)     Difficulty of Paying Living Expenses: Somewhat hard   Food Insecurity: No Food Insecurity (2/29/2024)    Hunger Vital Sign     Worried About Running Out of Food in the Last Year: Never true     Ran Out of Food in the Last Year: Never true   Transportation Needs: No Transportation Needs (2/29/2024)    PRAPARE - Transportation     Lack of Transportation (Medical): No     Lack of Transportation (Non-Medical): No   Physical Activity: Inactive (2/29/2024)    Exercise Vital Sign     Days of Exercise per Week: 0 days     Minutes of Exercise per Session: 0 min   Stress: Stress Concern Present (2/29/2024)    [employer] - Occupational Stress Questionnaire     Feeling of Stress :  Very much   Housing Stability: Low Risk  (2/29/2024)    Housing Stability Vital Sign     Unable to Pay for Housing in the Last Year: No     Number of Places Lived in the Last Year: 1     Unstable Housing in the Last Year: No       Review of Systems:   Review of Systems   Constitutional:  Negative for fever and weight loss.   HENT:  Negative for congestion, hearing loss and sore throat.    Eyes:  Negative for blurred vision.   Respiratory:  Negative for cough and shortness of breath.    Cardiovascular:  Negative for chest pain, palpitations, claudication and leg swelling.   Gastrointestinal:  Negative for abdominal pain, constipation, diarrhea and heartburn.   Genitourinary:  Negative for dysuria.   Musculoskeletal:  Negative for back pain and myalgias.   Skin:  Negative for rash.   Neurological:  Negative for focal weakness and headaches.   Psychiatric/Behavioral:  Negative for depression and suicidal ideas. The patient is not nervous/anxious.          Medications:  Outpatient Encounter Medications as of 12/17/2024   Medication Sig Dispense Refill    ALPRAZolam (XANAX) 0.25 MG tablet Take 1 tablet (0.25 mg total) by mouth 2 (two) times daily as needed for Anxiety. 20 tablet 0    amantadine  mg Tab Half a tab BID 60 tablet 11    carbidopa-levodopa (RYTARY) 36. mg CpSR TAKE 1 CAPSULE BY MOUTH THREE TIMES A DAY 90 capsule 0    EScitalopram oxalate (LEXAPRO) 10 MG tablet TAKE 1 TABLET BY MOUTH EVERY DAY 90 tablet 3    estradioL (ESTRACE) 0.01 % (0.1 mg/gram) vaginal cream Place 1 g vaginally 3 (three) times a week. Place by fingertip application before bedtime three times a week (Monday, Wednesday, Friday) 45 g 3    mirabegron (MYRBETRIQ) 50 mg Tb24 Take 1 tablet (50 mg total) by mouth once daily. 30 tablet 11    nebivoloL (BYSTOLIC) 2.5 MG Tab TAKE 1 TABLET BY MOUTH EVERY DAY 90 tablet 3    rasagiline (AZILECT) 1 mg Tab Take 1 tablet (1 mg total) by mouth once daily. 90 tablet 3     No  "facility-administered encounter medications on file as of 12/17/2024.        Allergies:  Review of patient's allergies indicates:   Allergen Reactions    Adhesive tape-silicones Blisters, Itching, Rash and Swelling         Physical Exam         Vitals:    12/17/24 1308   BP: 120/80   Pulse: 78   Resp: 18   Temp: 98 °F (36.7 °C)         Physical Exam  Constitutional:       Appearance: She is well-developed.   Eyes:      Pupils: Pupils are equal, round, and reactive to light.   Neck:      Thyroid: No thyromegaly.   Cardiovascular:      Rate and Rhythm: Normal rate.      Heart sounds: Normal heart sounds. No murmur heard.     No friction rub. No gallop.   Pulmonary:      Breath sounds: Normal breath sounds.   Abdominal:      General: Bowel sounds are normal.      Palpations: Abdomen is soft.   Musculoskeletal:         General: Normal range of motion.      Cervical back: Normal range of motion.   Lymphadenopathy:      Cervical: No cervical adenopathy.   Skin:     General: Skin is warm.      Findings: No rash.   Neurological:      Mental Status: She is alert and oriented to person, place, and time.      Cranial Nerves: No cranial nerve deficit.   Psychiatric:         Behavior: Behavior normal.          Laboratory:  CBC:  Recent Labs   Lab Result Units 12/11/24  0907   WBC K/uL 5.55   RBC M/uL 4.33   Hemoglobin g/dL 13.8   Hematocrit % 42.8   Platelets K/uL 231   MCV fL 99*   MCH pg 31.9*   MCHC g/dL 32.2     CMP:  Recent Labs   Lab Result Units 12/11/24  0907   Glucose mg/dL 92   Calcium mg/dL 9.5   Albumin g/dL 3.9   Total Protein g/dL 7.1   Sodium mmol/L 141   Potassium mmol/L 3.9   CO2 mmol/L 28   Chloride mmol/L 102   BUN mg/dL 16   Alkaline Phosphatase U/L 81   ALT U/L 5*   AST U/L 14   Total Bilirubin mg/dL 0.7     URINALYSIS:  No results for input(s): "COLORU", "CLARITYU", "SPECGRAV", "PHUR", "PROTEINUA", "GLUCOSEU", "BILIRUBINCON", "BLOODU", "WBCU", "RBCU", "BACTERIA", "MUCUS", "NITRITE", "LEUKOCYTESUR", " ""UROBILINOGEN", "HYALINECASTS" in the last 2160 hours.   LIPIDS:  Recent Labs   Lab Result Units 12/11/24  0907   TSH uIU/mL 2.337   HDL mg/dL 96*   Cholesterol mg/dL 239*   Triglycerides mg/dL 73   LDL Cholesterol mg/dL 128.4   HDL/Cholesterol Ratio % 40.2   Non-HDL Cholesterol mg/dL 143   Total Cholesterol/HDL Ratio  2.5     TSH:  Recent Labs   Lab Result Units 12/11/24  0907   TSH uIU/mL 2.337     A1C:  No results for input(s): "HGBA1C" in the last 2160 hours.    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 Will be having deep brain stimulation procedure with dr miriam carver 9/26/23 .          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  Return to clinic in 6 months.      Frederick W Dantagnan [facility]                  "

## 2025-02-21 ENCOUNTER — PATIENT MESSAGE (OUTPATIENT)
Dept: ADMINISTRATIVE | Facility: HOSPITAL | Age: 60
End: 2025-02-21
Payer: MEDICARE

## 2025-02-22 DIAGNOSIS — Z12.11 SCREENING FOR COLON CANCER: ICD-10-CM

## 2025-02-24 ENCOUNTER — OFFICE VISIT (OUTPATIENT)
Dept: OBSTETRICS AND GYNECOLOGY | Facility: CLINIC | Age: 60
End: 2025-02-24
Payer: MEDICARE

## 2025-02-24 ENCOUNTER — PATIENT OUTREACH (OUTPATIENT)
Dept: ADMINISTRATIVE | Facility: HOSPITAL | Age: 60
End: 2025-02-24
Payer: MEDICARE

## 2025-02-24 VITALS
BODY MASS INDEX: 23.59 KG/M2 | WEIGHT: 146.81 LBS | DIASTOLIC BLOOD PRESSURE: 72 MMHG | HEIGHT: 66 IN | SYSTOLIC BLOOD PRESSURE: 122 MMHG

## 2025-02-24 DIAGNOSIS — Z11.51 ENCOUNTER FOR SCREENING FOR HUMAN PAPILLOMAVIRUS (HPV): ICD-10-CM

## 2025-02-24 DIAGNOSIS — M81.0 AGE-RELATED OSTEOPOROSIS WITHOUT CURRENT PATHOLOGICAL FRACTURE: ICD-10-CM

## 2025-02-24 DIAGNOSIS — Z12.4 CERVICAL CANCER SCREENING: Primary | ICD-10-CM

## 2025-02-24 PROCEDURE — 3078F DIAST BP <80 MM HG: CPT | Mod: CPTII,S$GLB,, | Performed by: OBSTETRICS & GYNECOLOGY

## 2025-02-24 PROCEDURE — 3074F SYST BP LT 130 MM HG: CPT | Mod: CPTII,S$GLB,, | Performed by: OBSTETRICS & GYNECOLOGY

## 2025-02-24 PROCEDURE — 1160F RVW MEDS BY RX/DR IN RCRD: CPT | Mod: CPTII,S$GLB,, | Performed by: OBSTETRICS & GYNECOLOGY

## 2025-02-24 PROCEDURE — 99999 PR PBB SHADOW E&M-EST. PATIENT-LVL III: CPT | Mod: PBBFAC,,, | Performed by: OBSTETRICS & GYNECOLOGY

## 2025-02-24 PROCEDURE — 87624 HPV HI-RISK TYP POOLED RSLT: CPT | Performed by: OBSTETRICS & GYNECOLOGY

## 2025-02-24 PROCEDURE — G0101 CA SCREEN;PELVIC/BREAST EXAM: HCPCS | Mod: S$GLB,,, | Performed by: OBSTETRICS & GYNECOLOGY

## 2025-02-24 PROCEDURE — 1159F MED LIST DOCD IN RCRD: CPT | Mod: CPTII,S$GLB,, | Performed by: OBSTETRICS & GYNECOLOGY

## 2025-02-24 PROCEDURE — G2211 COMPLEX E/M VISIT ADD ON: HCPCS | Mod: S$GLB,,, | Performed by: OBSTETRICS & GYNECOLOGY

## 2025-02-24 RX ORDER — ONDANSETRON 8 MG/1
4 TABLET, ORALLY DISINTEGRATING ORAL EVERY 6 HOURS PRN
COMMUNITY
Start: 2025-01-29

## 2025-02-24 NOTE — PROGRESS NOTES
"Chief Complaint: Well Woman Exam     HPI:      Tika Navarro is a 59 y.o.  who presents today for well woman exam.  LMP: Patient's last menstrual period was 2011.  No issues, problems, or complaints. Was recently seen by Dr. Diaz of urogyn and dx'd with GSM. Pt was given an Rx for estrace cream, but was uncertain about its use so has not started it. Specifically, patient denies abnormal vaginal bleeding, discharge, pelvic pain, urinary problems, or changes in appetite. Ms. Navarro is not currently sexually active.     Previous Pap: Negative per patient   Previous Mammogram: BiRads: 2 T-C Score: 5.4% (23)  Previous Colonoscopy: FiOBT ordered by PCP  Previous DEXA scan: Osteoporosis (2023)     Ms. Navarro confirms that she wears her seatbelt when riding in the car.     OB History          2    Para        Term                AB   0    Living   2         SAB        IAB        Ectopic        Multiple        Live Births   2               Physical Exam:      Physical Exam     /72 (Patient Position: Sitting)   Ht 5' 6" (1.676 m)   Wt 66.6 kg (146 lb 13.2 oz)   LMP 2011   BMI 23.70 kg/m²   Body mass index is 23.7 kg/m².     APPEARANCE: Well nourished, well developed, in no acute distress.  PSYCH: Appropriate mood and affect.  SKIN: No acne or hirsutism  NECK: Neck symmetric without masses  NODES: No inguinal, axillary, or supraclavicular lymph node enlargement  ABDOMEN: Soft.  No tenderness or masses.    CARDIOVASCULAR: No edema of peripheral extremities  BREASTS: Symmetrical, well healed surgical scars on right breast. No palpable masses. No nipple discharge bilaterally. Pulse generated located underneath the skin in the left chest wall above the breast.   PELVIC: Normal external genitalia without lesions.  Normal hair distribution.  Adequate perineal body, normal urethral meatus.  Vagina moist and smooth. Without lesions. Vagina without abnormal discharge.  Cervix " without lesions, abnormal discharge, or tenderness.. No significant cystocele or rectocele.  Bimanual exam shows uterus to be normal size, regular, mobile and nontender.  Adnexa without masses or tenderness.      A female chaperone was present for the breast and pelvic exam.     Assessment/Plan:     Cervical cancer screening  -     HPV High Risk Genotypes, PCR    Encounter for screening for human papillomavirus (HPV)  -     Liquid-Based Pap Smear, Screening    Age-related osteoporosis without current pathological fracture  -     DXA Bone Density Axial Skeleton 1 or more sites; Future; Expected date: 02/24/2025    Discussed the benefits of vaginal estrogen cream and encouraged use.       Follow up in about 1 year (around 2/24/2026) for Annual Exam.    Counseling:     Patient was counseled today on current ASCCP pap guidelines, the recommendation for yearly physical exams, safe driving habits, and breast self awareness. She is to see her PCP for other health maintenance.     Use of the [facility] Patient Portal discussed and encouraged during today's visit.

## 2025-02-24 NOTE — PROGRESS NOTES
Health Maintenance Due   Topic Date Due    Hepatitis C Screening  Never done    HIV Screening  Never done    High Dose Statin  Never done    Colorectal Cancer Screening  Never done    Shingles Vaccine (1 of 2) Never done    Pneumococcal Vaccines (Age 50+) (1 of 1 - PCV) Never done    RSV Vaccine (Age 60+ and Pregnant patients) (1 - Risk 60-74 years 1-dose series) Never done    Mammogram  05/24/2024    COVID-19 Vaccine (3 - 2024-25 season) 09/01/2024     Chart review completed.  Updated. Triggered Links. Immunizations reviewed and updated. Care Everywhere Updated. Care Team Updated.  Mammogram scheduled. iFOBT kit ordered.

## 2025-02-25 ENCOUNTER — PATIENT MESSAGE (OUTPATIENT)
Dept: ADMINISTRATIVE | Facility: HOSPITAL | Age: 60
End: 2025-02-25
Payer: MEDICARE

## 2025-02-28 ENCOUNTER — HOSPITAL ENCOUNTER (OUTPATIENT)
Dept: RADIOLOGY | Facility: HOSPITAL | Age: 60
Discharge: HOME | End: 2025-02-28
Attending: OBSTETRICS & GYNECOLOGY
Payer: MEDICARE

## 2025-02-28 ENCOUNTER — HOSPITAL ENCOUNTER (OUTPATIENT)
Dept: RADIOLOGY | Facility: HOSPITAL | Age: 60
Discharge: HOME | End: 2025-02-28
Attending: INTERNAL MEDICINE
Payer: MEDICARE

## 2025-02-28 VITALS — HEIGHT: 66 IN | WEIGHT: 146 LBS | BODY MASS INDEX: 23.46 KG/M2

## 2025-02-28 DIAGNOSIS — M81.0 AGE-RELATED OSTEOPOROSIS WITHOUT CURRENT PATHOLOGICAL FRACTURE: ICD-10-CM

## 2025-02-28 DIAGNOSIS — Z12.31 ENCOUNTER FOR SCREENING MAMMOGRAM FOR BREAST CANCER: ICD-10-CM

## 2025-02-28 LAB
HPV HR 12 DNA SPEC QL NAA+PROBE: NEGATIVE
HPV16 AG SPEC QL: NEGATIVE
HPV18 DNA SPEC QL NAA+PROBE: NEGATIVE

## 2025-02-28 PROCEDURE — 77067 SCR MAMMO BI INCL CAD: CPT | Mod: TC

## 2025-02-28 PROCEDURE — 77067 SCR MAMMO BI INCL CAD: CPT | Mod: 26,,, | Performed by: RADIOLOGY

## 2025-02-28 PROCEDURE — 77063 BREAST TOMOSYNTHESIS BI: CPT | Mod: 26,,, | Performed by: RADIOLOGY

## 2025-03-05 ENCOUNTER — RESULTS FOLLOW-UP (OUTPATIENT)
Dept: OBSTETRICS AND GYNECOLOGY | Facility: CLINIC | Age: 60
End: 2025-03-05

## 2025-03-07 ENCOUNTER — OFFICE VISIT (OUTPATIENT)
Facility: CLINIC | Age: 60
End: 2025-03-07
Payer: MEDICARE

## 2025-03-07 VITALS
WEIGHT: 147.38 LBS | SYSTOLIC BLOOD PRESSURE: 132 MMHG | HEIGHT: 66 IN | BODY MASS INDEX: 23.69 KG/M2 | DIASTOLIC BLOOD PRESSURE: 80 MMHG | HEART RATE: 88 BPM

## 2025-03-07 DIAGNOSIS — F06.70 MILD NEUROCOGNITIVE DISORDER DUE TO PARKINSON'S DISEASE: ICD-10-CM

## 2025-03-07 DIAGNOSIS — G20.B2 PARKINSON'S DISEASE WITH DYSKINESIA AND FLUCTUATING MANIFESTATIONS: Primary | Chronic | ICD-10-CM

## 2025-03-07 DIAGNOSIS — G20.A1 MILD NEUROCOGNITIVE DISORDER DUE TO PARKINSON'S DISEASE: ICD-10-CM

## 2025-03-07 PROCEDURE — 99999 PR PBB SHADOW E&M-EST. PATIENT-LVL III: CPT | Mod: PBBFAC,,, | Performed by: PSYCHIATRY & NEUROLOGY

## 2025-03-07 NOTE — ASSESSMENT & PLAN NOTE
YOPD with dyskinesias  S/P DBS L GPI    DBS wise  Continues on program 1-  Stable but seems to have FOG  Continue  Rytary 145mg to Q4H  Plus Amantadine 100mg BID      Programming wise  Ilumina groups 3 and 4  3 is ACTIVE  No worsening to FOG  Hand taps better  1-old  2-older    We retrained on how to use the     Could consider other side DBS when she is ready - her L side has progressed

## 2025-03-07 NOTE — PROGRESS NOTES
"DBS programming    Interval Hx  Since last visit,   DBS wise L GPI Art system  Left on program 1 at 1.9mA  Doing well  Has not tried other programs 2 or 3 or 4    2 falls total  Does not use an assist device    R foot FOG is still present when meds wear off  She feels the Left foot also freezes    Now L side tremor in her hands    Dyskinesia much better  R handed  Nonmotor symptoms now much better - no sweat attacks  L side now shaking at times    Rytary 145mg to Q4H  Plus Amantadine 100mg BID  Sleeps poorly - takes melatonin inconsistently    "priorHPI: 59 y.o. woman with HTN, anxiety, R hip replacement, MVP, with iPD starting 2004 coming for DBS eval per Dr. Padgett. She notes in 2004 R hand 5th digit started to have a postural tremor. This progressed to the arm that years. Her gait was uninvolved until much more recently. Fell and broke elbow 2017. R hip pain began and grew until she was wheelchair due to pain until a repair 2018 (revised several times). After surgery she started feeling imbalanced. Does not fall but trips at times. Does not use an assist device. Can walk 2 blocks before out of breath.  (Pulmonology workup ongoing). Dyskinesias started 4 years ago. At times she can rock herself out of a chair (sliding down and out). Mild R foot dystonia started 4 years ago.  Some FOG when she turns x 4 years (when OFF)."    Sweats profusely when medications wear off    2 brothers and sister. No neuro issues.  Parents without neuro issues.  Has 2 boys- 40 and 38.    Med hx  Did not take medications till 2010  Started carbidopa/levodopa 25/100mg and amantadine    Current medications  -Rytary 145 1 tab TID [id number].  Lasting 4-5 hours before noticing effect wears off.    When it wears off her walking slows r>L.  -Dyskinesias continue, trying amantidine 100 mg Qdaily with about 70% relief.    Auditory hallucinations at times  Mild orthostasis    DBS expectations    She'd like to decrease medications to " "decrease dyskinesias  She'd like to be able to better balance  She wants her speech top be clearer  She'd like more energy  She'd like her hip pain  to resolve - but unclear if is from prior hip surgery    Had neuropsych testing 1 year prior    "Prior  Interval History:  - amantadine caused livedo, she's generally ok with   - more dyskinesia and dystonia of R arm and leg  - some falls, usually happens with a trip but more dramatic than if she did not have PD  - no big injuries, feels her vital proteins are helping  - gambling is still an issue, playing video [employer] machines - at the one place she goes, but has had wins and losses of $1000+ at a time  - eating healthy, sex drive is higher than her partner, says it is ok with their relationship  - NOW is finally interested in DBS, not intersted in any med changes at this time    From 9/30/21:  - 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, reduced down to only 1 tab a day and the reti remains  - rytary 95 TID - if higher, gets back dyskinesia  - getting PT TID, at [facility] [address]  - lost about 6 lbs, weighs about 130  - really feeling only slightly off at times  - willing to try 145s  - enjoying long term    2021:  - feeling more progression, more into the left  - more left hand tremor  - feels rytary wearing off, in the dip at 3-4 hours  - tingling in fingers and toes are periodically  - R hip is tighter than ever, post surgery 3 years ago - more pain now  - some worry about memory in the house, more micorwave issues "  -     From 2/19/20  -Rytary 145 1 tab TID [id number].  Lasting 4-5 hours before noticing effect wears off.    -Dyskinesias continue, trying amantidine 100 mg Qdaily with about 70% relief.  However, significantly worsens with stress.  -Continues on azilect, tolerating well  -Last fall, September 2019.  Using walker without further falls.    -Lots of stress at work with new boss.    -Memory loss - forgetting names of kids, best " "friends.  -Describes stabbing pain in R buttocks, no radiation.  Worse in AM.  Trying stretching, massage.  Feels similar pain to prior when she had R hip arthroplasty.      From 11/2019  - still having the Rytary dip  - more dystonia of the R side affecting gait  - not happy with her doing worse  - out of rytary 95s  - more freezing when off  - some ICD with shopping and eating and gambling    June 2019:  - last seen last year  - more off time, better stronger percieved at 3 hours ("Rytary dip")  - takes 1/2 tab cd/ld at 1 hr, then she gets dyskinetic  - no new tremor  - more dy skinesia  - cog ok  - mood stable  - no new bowel or bladder issues    From May 2018:   - 3 hip surgeries, last one was fully replaced R side on May 23rd.  Had some wacky dreams in the post-op ion January, better now.  Previous plate screws in R hip not working, then they added a amelie, and failed as well with poor healing.  Then replaced the whole thing.  Feels well overall now.     - now with a bit of pain but ok  - less dyskinesia now  -adds a little IR in the am (1/4 to 1/2) to boost her on      From Aug 2017:  - taking rytary 145 1 tab tid plus adds in cd/ld 25/100 1/4 tabs as needed  - doing well overall  - feels gait si a bit crooked  - still fairly brittle with dyskinesia  - declines dbs or duopa  - might be good candidate for neuroderm patch study  - dx with jose schuster of hand      From 6/2016 with Alisia:  Dyskinesias are not bad until she gets anxious. Mainly notes in the RUE/ RLE.  Tends to note dyskinesias in the evening around 7 to 7:30 PM, which annoys her as she is home and eating supper and wants to relax. She mentions that if could choose to have tremor or dyskinesias, she'd choose tremor.     She takes Rytary at 7:30- 12:30- 5:30. She uses 1/4 tab of cd/ld PRN tremor, stiffness, or slowness. On a bad day, she will use 1/4 tab up to TID. She has days where she does not require any extra doses.      Impulsive tendencies:  " Eating, sex, gambling, and spending.    History of Present Illness (HPI):  46 yo patient with history of YOPD, previously seen by my colleague Dr. Burris, last visit one year ago.    Patient describes initial symptoms of R hand tremor, progressing with mariela and CW over the past 5-7 years.  Had ICD with MPX - now not taking.  Poorly compliant with CD/LD 25/100 and has tendency to use as needed.    Wants more stability and regular follow-up.    Nonmotor/Premotor symptoms:  Hyposmia? Yes  RBD/sleep issues? Yes  Depression/anxiety? No  Constipation? No  Urinary issues? No  Sexual dysfunction? No  Orthostasis? No  Falls? No  Cognitive impairment? No  Psychoses? No  Pain? No    Per Dr. Burris's notes: 47 yo female with PD.  She takes Mirapex ER 0.75mg daily which has been titrated down due to her obsessive gambling problem. She was started on Sinemet 25/100 that she is taking 1 tablet twice daily. She is tolerating it well without major side effects, but does not think it is helpful at all for right hand tremor. She was previously on Artane with major improvement in symptoms but had to stop that due to forgetfulness and stuttering problems.  Currently she states that her symptoms are worse since March when she stopped Artane. She is having trouble with writing and balance.  Her sleep is fine and has not had any falls. Her gambling problem is improved according to her, but still compulsive 1/week.     ROS:  Review of Systems   Constitutional:  Negative for fever, malaise/fatigue and weight loss.   HENT:  Negative for congestion and hearing loss.    Eyes:  Negative for blurred vision and double vision.   Respiratory:  Negative for cough, shortness of breath and stridor.    Cardiovascular:  Negative for chest pain, palpitations and leg swelling.   Gastrointestinal:  Negative for constipation, nausea and vomiting.   Genitourinary:  Negative for dysuria, frequency and urgency.   Musculoskeletal:  Positive for falls. Negative for  myalgias.   Skin:  Negative for rash.   Neurological:  Positive for tremors. Negative for speech change, focal weakness, seizures and headaches.   Endo/Heme/Allergies:  Does not bruise/bleed easily.   Psychiatric/Behavioral:  Negative for depression, hallucinations and memory loss. The patient is not nervous/anxious.      Past Medical History: The patient  has a past medical history of Anxiety, Hypertension, Mitral valve prolapse syndrome, Other chronic sinusitis, Parkinson disease (2004), and PONV (postoperative nausea and vomiting).    Social History: The patient  reports that she has never smoked. She has never used smokeless tobacco. She reports current alcohol use of about 1.0 standard drink of alcohol per week. She reports that she does not use drugs.    Family History: Their family history includes Coronary artery disease in her father; Diabetes in her father and mother; Hypertension in her mother; Neuropathy in her mother.    Allergies: Adhesive tape-silicones     Meds:   Current Outpatient Medications on File Prior to Visit   Medication Sig Dispense Refill    ALPRAZolam (XANAX) 0.25 MG tablet Take 1 tablet (0.25 mg total) by mouth 2 (two) times daily as needed for Anxiety. 20 tablet 0    amantadine  mg Tab Half a tab BID 60 tablet 11    carbidopa-levodopa (RYTARY) 36. mg CpSR TAKE 1 CAPSULE BY MOUTH THREE TIMES A DAY 90 capsule 0    EScitalopram oxalate (LEXAPRO) 10 MG tablet TAKE 1 TABLET BY MOUTH EVERY DAY 90 tablet 3    estradioL (ESTRACE) 0.01 % (0.1 mg/gram) vaginal cream Place 1 g vaginally 3 (three) times a week. Place by fingertip application before bedtime three times a week (Monday, Wednesday, Friday) 45 g 3    mirabegron (MYRBETRIQ) 50 mg Tb24 Take 1 tablet (50 mg total) by mouth once daily. 30 tablet 11    nebivoloL (BYSTOLIC) 2.5 MG Tab TAKE 1 TABLET BY MOUTH EVERY DAY 90 tablet 3    ondansetron (ZOFRAN-ODT) 8 MG TbDL Take 4 mg by mouth every 6 (six) hours as needed.       "rasagiline (AZILECT) 1 mg Tab Take 1 tablet (1 mg total) by mouth once daily. 90 tablet 3     No current facility-administered medications on file prior to visit.     Exam:  /80 (BP Location: Right arm, Patient Position: Sitting)   Pulse 88   Ht 5' 6" (1.676 m)   Wt 66.8 kg (147 lb 6 oz)   LMP 12/28/2011   BMI 23.79 kg/m²       MOTOR EXAMINATION ON       Speech  0 - Normal.   Facial Expression  1 - Minimal Hypomimia, could be normal "Poker Face".   Tremor at Rest:      Face, lips, chin 0 - Absent.    Hands:      right 2- Moderate in amplitude and present most of the time.   left 1 - Mild in amplitude and persistent. Or moderate in amplitude, but only intermittently present.    Feet:      right 0 - Absent.    left 0 - Absent.    Action or Postural Tremor of Hands      right 1 - Slight; present with action.   left 1 - Slight; present with action.                           Finger Taps      right 2 - Severely impaired. Frequent hesitation in initiating movements or arrests in ongoing movement.   left 2 - Moderately impaired. Definite and early fatiguing. May have occasional arrests in movement.   Hand Movements      right 2 - Moderately impaired. Definite and early fatiguing. May have occasional arrests in movement.   left 1 - Mild slowing and/or reduction in amplitude.   Rapid Alternating Movements of Hands      right 2 - Severely impaired. Frequent hesitation in initiating movements or arrests in ongoing movement.   left 1- Moderately impaired. Definite and early fatiguing. May have occasional arrests in movement.   Leg Agility      right 3 - Severely impaired. Frequent hesitation in initiating movements or arrests in ongoing movement.   left 1 - Moderately impaired. Definite and early fatiguing. May have occasional arrests in movement.   Arising from Chair  1 - Slow; or may need more than one attempt.   Posture  1 - Not quite erect, slightly stooped posture; could be normal for older person.   Gait  1 - " Walks slowly, may shuffle with short steps, but no festination (hastening steps) or propulsion.       Body Bradykinesia and Hypokinesia (Combining slowness, hesitancy, decreased armswing, small amplitude, and poverty of movement in general)  1 - Minimal slowness, giving movement a deliberate character; could be normal for some persons. Possibly reduced amplitude.     DBS MRI Compatibility INFO  03/07/2025      IPG Model(s) BS Genus R16    Serial No. [id number]   Impedance NL   Battery Fully charged - rechargeable   Pocket Adapter  None     _______________________________________    Initial              Programming  Ilumina groups 3 and 4  No worsening to FOG  Hand taps better  1-old  2-older    FINAL                  ----------  PRIOR    LEFT GPI 90/130  C&3 @2.0mA no imoprovement  C&2 @2.0mA better walking  C&1 @1.0mA better hand tapping  C&0 @0.7mA phosphenes    Initial    Program 2    Program 1          FINAL  1- new  2- fallback  3- new2  4- new3 - like 2 but tricia foot tapping            FINAL  Program 3 - blocked low to avoid phosphes    Program 2    Program 1          Final settings:      Laboratory/Radiological:  - Results:  - Independent review of images: MRI normal 2011    Problem List Items Addressed This Visit          Neuro    PD (Parkinson's disease) - Primary (Chronic)    Overview   neruop managing dr abigail interiano current . Will be having deep brain stimulation procedure with dr miriam carver 9/26/23 .          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

## 2025-03-10 ENCOUNTER — APPOINTMENT (OUTPATIENT)
Dept: RADIOLOGY | Facility: CLINIC | Age: 60
End: 2025-03-10
Attending: OBSTETRICS & GYNECOLOGY
Payer: MEDICARE

## 2025-03-10 PROCEDURE — 77080 DXA BONE DENSITY AXIAL: CPT | Mod: 26,,, | Performed by: INTERNAL MEDICINE

## 2025-03-10 PROCEDURE — 77080 DXA BONE DENSITY AXIAL: CPT | Mod: TC,PO

## 2025-03-18 DIAGNOSIS — I10 BENIGN ESSENTIAL HYPERTENSION: ICD-10-CM

## 2025-03-18 RX ORDER — NEBIVOLOL 2.5 MG/1
2.5 TABLET ORAL DAILY
Qty: 90 TABLET | Refills: 3 | Status: SHIPPED | OUTPATIENT
Start: 2025-03-18

## 2025-03-18 NOTE — TELEPHONE ENCOUNTER
No care due was identified.  [facility] Embedded Care Due Messages. Reference number: [id number].   3/18/2025 9:22:54 AM CDT

## 2025-03-24 DIAGNOSIS — Z00.00 ENCOUNTER FOR MEDICARE ANNUAL WELLNESS EXAM: ICD-10-CM

## 2025-03-27 ENCOUNTER — PATIENT MESSAGE (OUTPATIENT)
Dept: ADMINISTRATIVE | Facility: HOSPITAL | Age: 60
End: 2025-03-27
Payer: MEDICARE

## 2025-04-16 ENCOUNTER — OFFICE VISIT (OUTPATIENT)
Dept: OBSTETRICS AND GYNECOLOGY | Facility: CLINIC | Age: 60
End: 2025-04-16
Payer: MEDICARE

## 2025-04-16 ENCOUNTER — PATIENT MESSAGE (OUTPATIENT)
Dept: OBSTETRICS AND GYNECOLOGY | Facility: CLINIC | Age: 60
End: 2025-04-16

## 2025-04-16 VITALS — BODY MASS INDEX: 23.48 KG/M2 | SYSTOLIC BLOOD PRESSURE: 100 MMHG | DIASTOLIC BLOOD PRESSURE: 68 MMHG | WEIGHT: 145.5 LBS

## 2025-04-16 DIAGNOSIS — M81.0 AGE-RELATED OSTEOPOROSIS WITHOUT CURRENT PATHOLOGICAL FRACTURE: Primary | ICD-10-CM

## 2025-04-16 PROCEDURE — 3008F BODY MASS INDEX DOCD: CPT | Mod: CPTII,S$GLB,, | Performed by: NURSE PRACTITIONER

## 2025-04-16 PROCEDURE — 1159F MED LIST DOCD IN RCRD: CPT | Mod: CPTII,S$GLB,, | Performed by: NURSE PRACTITIONER

## 2025-04-16 PROCEDURE — 99999 PR PBB SHADOW E&M-EST. PATIENT-LVL III: CPT | Mod: PBBFAC,,, | Performed by: NURSE PRACTITIONER

## 2025-04-16 PROCEDURE — 3078F DIAST BP <80 MM HG: CPT | Mod: CPTII,S$GLB,, | Performed by: NURSE PRACTITIONER

## 2025-04-16 PROCEDURE — 99213 OFFICE O/P EST LOW 20 MIN: CPT | Mod: S$GLB,,, | Performed by: NURSE PRACTITIONER

## 2025-04-16 PROCEDURE — 1160F RVW MEDS BY RX/DR IN RCRD: CPT | Mod: CPTII,S$GLB,, | Performed by: NURSE PRACTITIONER

## 2025-04-16 PROCEDURE — 3074F SYST BP LT 130 MM HG: CPT | Mod: CPTII,S$GLB,, | Performed by: NURSE PRACTITIONER

## 2025-04-25 ENCOUNTER — TELEPHONE (OUTPATIENT)
Dept: NEUROLOGY | Facility: CLINIC | Age: 60
End: 2025-04-25
Payer: MEDICARE

## 2025-04-25 NOTE — TELEPHONE ENCOUNTER
spoke with patient in regards to scheduling a 3 month follow up appt. pt confirmed appt date and time.

## 2025-05-05 RX ORDER — RASAGILINE 1 MG/1
1 TABLET ORAL
Qty: 90 TABLET | Refills: 3 | Status: SHIPPED | OUTPATIENT
Start: 2025-05-05

## 2025-05-06 ENCOUNTER — PATIENT MESSAGE (OUTPATIENT)
Dept: PRIMARY CARE CLINIC | Facility: CLINIC | Age: 60
End: 2025-05-06
Payer: MEDICARE

## 2025-05-16 ENCOUNTER — PATIENT MESSAGE (OUTPATIENT)
Facility: CLINIC | Age: 60
End: 2025-05-16
Payer: MEDICARE

## 2025-05-16 DIAGNOSIS — F41.1 GENERALIZED ANXIETY DISORDER: Chronic | ICD-10-CM

## 2025-05-16 NOTE — TELEPHONE ENCOUNTER
----- Message from Alice sent at 5/16/2025  3:51 PM CDT -----  Contact: [phone]  Requesting an RX refill or new RX.Is this a refill or new RX: Refill 1RX name and strength (copy/paste from chart):  EScitalopram oxalate (LEXAPRO) 10 MG tabletIs this a 30 day or 90 day RX: Pharmacy name and phone # (copy/paste from chart):  [facility]/pharmacy #[id number] - [place] - [address] Phone: [phone]: [phone] doctors have asked that we provide their patients with the following 2 reminders -- prescription refills can take up to 72 hours, and a friendly reminder that in the future you can use your [facility] account to request refills: Patient stated that pharmacy told her that they have sent 3 requested, patient does not have any left pill. Would like a call back. Thank you.

## 2025-05-16 NOTE — TELEPHONE ENCOUNTER
No care due was identified.  [facility] Embedded Care Due Messages. Reference number: [id number].   5/16/2025 4:03:31 PM CDT

## 2025-05-19 ENCOUNTER — PATIENT MESSAGE (OUTPATIENT)
Dept: PRIMARY CARE CLINIC | Facility: CLINIC | Age: 60
End: 2025-05-19
Payer: MEDICARE

## 2025-05-19 ENCOUNTER — TELEPHONE (OUTPATIENT)
Dept: PRIMARY CARE CLINIC | Facility: CLINIC | Age: 60
End: 2025-05-19
Payer: MEDICARE

## 2025-05-19 RX ORDER — ESCITALOPRAM OXALATE 10 MG/1
10 TABLET ORAL DAILY
Qty: 90 TABLET | Refills: 3 | Status: SHIPPED | OUTPATIENT
Start: 2025-05-19

## 2025-05-19 NOTE — TELEPHONE ENCOUNTER
Spoke with patient to inform her that Dr. Darling was not in the office on Friday but is in today and will address the script. Patient states if she needs to come in then please let her know and she will make an appt but she really needs this med filled cause she is out.

## 2025-05-19 NOTE — TELEPHONE ENCOUNTER
----- Message from Shashi sent at 5/19/2025 10:17 AM CDT -----  Contact: pt @ [phone]  Pharmacy is calling to clarify an RX. RX name:  EScitalopram oxalate (LEXAPRO) 10 MG tablet What do they need to clarify:  pt has questions about Rx authorization  Comments:

## 2025-06-06 ENCOUNTER — PATIENT MESSAGE (OUTPATIENT)
Dept: ADMINISTRATIVE | Facility: HOSPITAL | Age: 60
End: 2025-06-06
Payer: MEDICARE

## 2025-06-23 RX ORDER — MIRABEGRON 50 MG/1
1 TABLET, FILM COATED, EXTENDED RELEASE ORAL
Qty: 90 TABLET | Refills: 3 | OUTPATIENT
Start: 2025-06-23

## 2025-06-23 NOTE — TELEPHONE ENCOUNTER
Called pt no answer. LVM informing to appt has been scheduled 25 @ 9:20 am    Copied from CRM #[id number]. Topic: Medications - Medication Refill  >> 2025  9:42 AM Kavita wrote:  Pt is requesting a  call from someone in the office and is asking for a return call back soon. Thanks.     Reason for call:discuss refill      Patient's DX:mirabegron (MYRBETRIQ) 50 mg Tb24     Patient requesting call back or [facility] [phone]

## 2025-06-25 ENCOUNTER — OFFICE VISIT (OUTPATIENT)
Dept: UROLOGY | Facility: CLINIC | Age: 60
End: 2025-06-25
Payer: MEDICARE

## 2025-06-25 VITALS
HEART RATE: 73 BPM | BODY MASS INDEX: 23.98 KG/M2 | WEIGHT: 148.56 LBS | SYSTOLIC BLOOD PRESSURE: 146 MMHG | DIASTOLIC BLOOD PRESSURE: 81 MMHG

## 2025-06-25 DIAGNOSIS — G20.A1 PARKINSON'S DISEASE, UNSPECIFIED WHETHER DYSKINESIA PRESENT, UNSPECIFIED WHETHER MANIFESTATIONS FLUCTUATE: ICD-10-CM

## 2025-06-25 DIAGNOSIS — N39.41 URGE INCONTINENCE: Primary | ICD-10-CM

## 2025-06-25 DIAGNOSIS — N32.81 OAB (OVERACTIVE BLADDER): ICD-10-CM

## 2025-06-25 PROCEDURE — 1160F RVW MEDS BY RX/DR IN RCRD: CPT | Mod: CPTII,S$GLB,,

## 2025-06-25 PROCEDURE — G2211 COMPLEX E/M VISIT ADD ON: HCPCS | Mod: S$GLB,,,

## 2025-06-25 PROCEDURE — 99999 PR PBB SHADOW E&M-EST. PATIENT-LVL III: CPT | Mod: PBBFAC,,,

## 2025-06-25 PROCEDURE — 3079F DIAST BP 80-89 MM HG: CPT | Mod: CPTII,S$GLB,,

## 2025-06-25 PROCEDURE — 99214 OFFICE O/P EST MOD 30 MIN: CPT | Mod: S$GLB,,,

## 2025-06-25 PROCEDURE — 3077F SYST BP >= 140 MM HG: CPT | Mod: CPTII,S$GLB,,

## 2025-06-25 PROCEDURE — 3008F BODY MASS INDEX DOCD: CPT | Mod: CPTII,S$GLB,,

## 2025-06-25 PROCEDURE — 1159F MED LIST DOCD IN RCRD: CPT | Mod: CPTII,S$GLB,,

## 2025-06-25 RX ORDER — LIDOCAINE HYDROCHLORIDE 20 MG/ML
JELLY TOPICAL ONCE
OUTPATIENT
Start: 2025-06-25 | End: 2025-06-25

## 2025-06-25 RX ORDER — MIRABEGRON 50 MG/1
50 TABLET, FILM COATED, EXTENDED RELEASE ORAL DAILY
Qty: 30 TABLET | Refills: 11 | Status: SHIPPED | OUTPATIENT
Start: 2025-06-25 | End: 2026-06-25

## 2025-06-25 NOTE — PROGRESS NOTES
[facility]      Return Overactive Bladder (OAB) Note    6/25/2025    History of Present Illness    CHIEF COMPLAINT:  Patient presents today for medication refill.    MEDICAL HISTORY:  58-year-old female with a history of Parkinson's disease and deep brain stimulation implantation. She previously saw Dr. Grace in May 2024, who recommended urodynamics testing due to reported urinary frequency and urgency. She subsequently saw Dr. Diaz six months later and elected to defer urodynamics testing at that time. She acknowledges understanding the potential benefits of obtaining a baseline urodynamic study to track potential disease progression.    GENITOURINARY:  Her urinary symptoms are potentially attributed to neurogenic origin related to Parkinson's disease. She reports Myrbetriq has been working well for her urinary symptoms but stopped taking the medication Friday or Saturday due to running out of refills. She experienced a urinary incontinence incident last night where she urinated on herself. She describes experiencing significant anxiety when unable to quickly unbutton her pants, which triggers involuntary urination. She currently wears protective pads due to concerns about potential incontinence episodes. She is uncertain of her exact Myrbetriq dosage but wants to resume medication to manage her symptoms. She experiences urinary urgency when brushing teeth with running water, feeling the need to quickly use the bathroom during this activity. She reports no blood in urine, no urinary tract infections, and is not waking up at night to urinate. She denies need for estrogen cream refill at this time with last refill estimated around November.        A review of 10+ systems was conducted with pertinent positive and negative findings documented in HPI with all other systems reviewed and negative.    Past medical, family, surgical and social history reviewed as documented in chart with pertinent  positive medical, family, surgical and social history detailed in HPI.    Exam Findings:    Const: no acute distress, conversant and alert  Eyes: anicteric, extraocular muscles intact  ENMT: normocephalic, Nl oral membranes  Cardio: no cyanosis, nl cap refill  Pulm: no tachypnea; no resp distress  Abd: soft, no tenderness  Musc: no laceration, no tenderness  Neuro: alert; oriented x 3  Skin: warm, dry; no petichiae  Psych: no anxiety; normal speech       Assessment/Plan:    Assessment & Plan    IMPRESSION:  - Assessed effectiveness of Myrbetriq for urinary symptoms.  - Correlated Parkinson's disease with bladder symptoms, possibly of neurogenic origin.  - Recommend urodynamic study to establish baseline bladder function, particularly in light of Parkinson's diagnosis.  - Advised discontinuation of Myrbetriq prior to urodynamic study to obtain accurate baseline measurements.  - Explained that sudden cessation of long-standing medication effectiveness could indicate a UTI.    PARKINSON'S DISEASE:  1. Discussed that Parkinson's disease can affect bladder function    BLADDER DYSFUNCTION:  1. Explained that the urge to urinate when hearing running water is a common phenomenon.  2. Continued Myrbetriq 50 mg daily; discontinue 2 weeks prior to scheduled urodynamic study.  3. Ordered urodynamic study with Dr. Diaz    URINARY INCONTINENCE:  1. Contact office if estrogen cream refill needed (anticipated around November).       I spent a total of 30 minutes on the day of the visit.This includes face to face time and non-face to face time preparing to see the patient (eg, review of tests), obtaining and/or reviewing separately obtained history, documenting clinical information in the electronic or other health record, independently interpreting results and communicating results to the patient/family/caregiver, or care coordinator.          Visit complexity today is associated with medical care services that are part of the  ongoing care related to the single serious and/or complex condition of Overactive bladder (OAB) and Urgency urinary incontinence (UUI). A longitudinal relationship exists or is being developed between the patient and this practitioner for the care of this condition.

## 2025-07-10 ENCOUNTER — NURSE TRIAGE (OUTPATIENT)
Facility: CLINIC | Age: 60
End: 2025-07-10
Payer: MEDICARE

## 2025-07-10 DIAGNOSIS — G20.B2 PARKINSON'S DISEASE WITH DYSKINESIA AND FLUCTUATING MANIFESTATIONS: Primary | ICD-10-CM

## 2025-07-10 RX ORDER — LEVODOPA AND CARBIDOPA 145; 36.25 MG/1; MG/1
1 CAPSULE, EXTENDED RELEASE ORAL 3 TIMES DAILY
Qty: 270 CAPSULE | Refills: 2 | Status: SHIPPED | OUTPATIENT
Start: 2025-07-10

## 2025-07-10 NOTE — TELEPHONE ENCOUNTER
Copied from CRM #[id number]. Topic: Medications - Medication Refill  >> Jul 10, 2025 10:44 AM Kim wrote:  Type:  RX Refill Request    Who Called: Pt     Refill or New Rx:Refill    RX Name and Strength:*carbidopa-levodopa (RYTARY) 36. mg CpSR    Is this a 30 day or 90 day RX:90    Preferred Pharmacy with phone number:*  [facility]/pharmacy #[id number] - [place] - [address]  [address] [place] [address]  Phone: [phone] Fax: [phone]  >> Jul 10, 2025  1:39 PM Med Assistant Emma wrote:    ----- Message -----  From: Kim Mak  Sent: 7/10/2025  10:48 AM CDT  To: Liz BARNES Staff    This refill was addressed previously

## 2025-07-10 NOTE — TELEPHONE ENCOUNTER
Copied from CRM #[id number]. Topic: Appointments - Appointment Access  >> Jul 10, 2025 10:42 AM Kim wrote:  Type:Schedule Appt      Caller is requesting to schedule their appointment     Name of Caller:Pt    Appt request:Pt called states need to sche an hour appt for DBS. Please advise     Would the patient rather a call back or a response via [facility]? Both    Best Call Back Number: Telephone Information:  Mobile          [phone]    Returned call to pt and scheduled

## 2025-07-10 NOTE — TELEPHONE ENCOUNTER
Subjective     Patient ID: Tika Navarro is a 59 y.o. female.    Chief Complaint: need a dbs programming appt    Pt scheduled for DBS follow up      Review of Systems na       Objective  n/a    Physical Exam n/a       Assessment and Plan n/a

## 2025-07-11 NOTE — TELEPHONE ENCOUNTER
Opened triage in error. Pt needed med renewal and kamila't   Pended rx and scheduled.

## 2025-07-23 ENCOUNTER — PATIENT MESSAGE (OUTPATIENT)
Facility: CLINIC | Age: 60
End: 2025-07-23
Payer: MEDICARE

## 2025-07-25 ENCOUNTER — OFFICE VISIT (OUTPATIENT)
Facility: CLINIC | Age: 60
End: 2025-07-25
Payer: MEDICARE

## 2025-07-25 ENCOUNTER — PATIENT MESSAGE (OUTPATIENT)
Facility: CLINIC | Age: 60
End: 2025-07-25

## 2025-07-25 ENCOUNTER — TELEPHONE (OUTPATIENT)
Facility: CLINIC | Age: 60
End: 2025-07-25
Payer: MEDICARE

## 2025-07-25 VITALS
WEIGHT: 147.94 LBS | HEIGHT: 66 IN | HEART RATE: 67 BPM | DIASTOLIC BLOOD PRESSURE: 77 MMHG | SYSTOLIC BLOOD PRESSURE: 125 MMHG | BODY MASS INDEX: 23.77 KG/M2

## 2025-07-25 DIAGNOSIS — G20.B2 PARKINSON'S DISEASE WITH DYSKINESIA AND FLUCTUATING MANIFESTATIONS: Primary | Chronic | ICD-10-CM

## 2025-07-25 PROCEDURE — 99999 PR PBB SHADOW E&M-EST. PATIENT-LVL III: CPT | Mod: PBBFAC,,, | Performed by: PSYCHIATRY & NEUROLOGY

## 2025-07-25 NOTE — ASSESSMENT & PLAN NOTE
YOPD with dyskinesias  S/P DBS L GPI  Considering it on R side    Consider genetic testing    DBS wise  3- OLD  4- best for R hand and R leg bradykinesia and tremor  1- like 4 but low hz to FOG          Rytary 145mg to Q4H  Plus Amantadine 50 mg BID decreased due to livedo reticularis      Programming wise  Ilumina groups 3 and 4  3 is ACTIVE  No worsening to FOG  Hand taps better  1-old  2-older

## 2025-07-25 NOTE — PROGRESS NOTES
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 woman with HTN, anxiety, R hip replacement, MVP, with iPD starting 2004 coming for DBS eval per Dr. Padgett. She notes in 2004 R hand 5th digit started to have a postural tremor. This progressed to the arm that years. Her gait was uninvolved until much more recently. Fell and broke elbow 2017. R hip pain began and grew until she was wheelchair due to pain until a repair 2018 (revised several times). After surgery she started feeling imbalanced. Does not fall but trips at times. Does not use an assist device. Can walk 2 blocks before out of breath.  (Pulmonology workup ongoing). Dyskinesias started 4 years ago. At times she can rock herself out of a chair (sliding down and out). Mild R foot dystonia started 4 years ago.  Some FOG when she turns x 4 years (when OFF)."    Sweats profusely when medications wear off    2 brothers and sister. No neuro issues.  Parents without neuro issues.  Has 2 boys- 40 and 38.    Med hx  Did not take medications till 2010  Started carbidopa/levodopa 25/100mg and amantadine    Current medications  -Rytary 145 1 tab TID [id number].  Lasting 4-5 hours before noticing effect wears off.    When it wears off her walking slows r>L.  -Dyskinesias continue, trying amantidine 100 mg Qdaily with about 70% relief.    Auditory hallucinations at times  Mild orthostasis    DBS expectations    She'd like " "to decrease medications to decrease dyskinesias  She'd like to be able to better balance  She wants her speech top be clearer  She'd like more energy  She'd like her hip pain  to resolve - but unclear if is from prior hip surgery    Had neuropsych testing 1 year prior    "Prior  Interval History:  - amantadine caused livedo, she's generally ok with   - more dyskinesia and dystonia of R arm and leg  - some falls, usually happens with a trip but more dramatic than if she did not have PD  - no big injuries, feels her vital proteins are helping  - gambling is still an issue, playing video [employer] machines - at the one place she goes, but has had wins and losses of $1000+ at a time  - eating healthy, sex drive is higher than her partner, says it is ok with their relationship  - NOW is finally interested in DBS, not intersted in any med changes at this time    From 9/30/21:  - 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, reduced down to only 1 tab a day and the reti remains  - rytary 95 TID - if higher, gets back dyskinesia  - getting PT TID, at [facility] on [address]  - lost about 6 lbs, weighs about 130  - really feeling only slightly off at times  - willing to try 145s  - enjoying correction    2021:  - feeling more progression, more into the left  - more left hand tremor  - feels rytary wearing off, in the dip at 3-4 hours  - tingling in fingers and toes are periodically  - R hip is tighter than ever, post surgery 3 years ago - more pain now  - some worry about memory in the house, more micorwave issues "  -     From 2/19/20  -Rytary 145 1 tab TID [id number].  Lasting 4-5 hours before noticing effect wears off.    -Dyskinesias continue, trying amantidine 100 mg Qdaily with about 70% relief.  However, significantly worsens with stress.  -Continues on azilect, tolerating well  -Last fall, September 2019.  Using walker without further falls.    -Lots of stress at work with new boss.    -Memory loss - forgetting names of " "kids, best friends.  -Describes stabbing pain in R buttocks, no radiation.  Worse in AM.  Trying stretching, massage.  Feels similar pain to prior when she had R hip arthroplasty.      From 11/2019  - still having the Rytary dip  - more dystonia of the R side affecting gait  - not happy with her doing worse  - out of rytary 95s  - more freezing when off  - some ICD with shopping and eating and gambling    June 2019:  - last seen last year  - more off time, better stronger percieved at 3 hours ("Rytary dip")  - takes 1/2 tab cd/ld at 1 hr, then she gets dyskinetic  - no new tremor  - more dy skinesia  - cog ok  - mood stable  - no new bowel or bladder issues    From May 2018:   - 3 hip surgeries, last one was fully replaced R side on May 23rd.  Had some wacky dreams in the post-op ion January, better now.  Previous plate screws in R hip not working, then they added a amelie, and failed as well with poor healing.  Then replaced the whole thing.  Feels well overall now.     - now with a bit of pain but ok  - less dyskinesia now  -adds a little IR in the am (1/4 to 1/2) to boost her on      From Aug 2017:  - taking rytary 145 1 tab tid plus adds in cd/ld 25/100 1/4 tabs as needed  - doing well overall  - feels gait si a bit crooked  - still fairly brittle with dyskinesia  - declines dbs or duopa  - might be good candidate for neuroderm patch study  - dx with jose schuster of hand      From 6/2016 with Alisia:  Dyskinesias are not bad until she gets anxious. Mainly notes in the RUE/ RLE.  Tends to note dyskinesias in the evening around 7 to 7:30 PM, which annoys her as she is home and eating supper and wants to relax. She mentions that if could choose to have tremor or dyskinesias, she'd choose tremor.     She takes Rytary at 7:30- 12:30- 5:30. She uses 1/4 tab of cd/ld PRN tremor, stiffness, or slowness. On a bad day, she will use 1/4 tab up to TID. She has days where she does not require any extra doses.      Impulsive " tendencies:  Eating, sex, gambling, and spending.    History of Present Illness (HPI):  46 yo patient with history of YOPD, previously seen by my colleague Dr. Burris, last visit one year ago.    Patient describes initial symptoms of R hand tremor, progressing with mariela and CW over the past 5-7 years.  Had ICD with MPX - now not taking.  Poorly compliant with CD/LD 25/100 and has tendency to use as needed.    Wants more stability and regular follow-up.    Nonmotor/Premotor symptoms:  Hyposmia? Yes  RBD/sleep issues? Yes  Depression/anxiety? No  Constipation? No  Urinary issues? No  Sexual dysfunction? No  Orthostasis? No  Falls? No  Cognitive impairment? No  Psychoses? No  Pain? No    Per Dr. Burris's notes: 47 yo female with PD.  She takes Mirapex ER 0.75mg daily which has been titrated down due to her obsessive gambling problem. She was started on Sinemet 25/100 that she is taking 1 tablet twice daily. She is tolerating it well without major side effects, but does not think it is helpful at all for right hand tremor. She was previously on Artane with major improvement in symptoms but had to stop that due to forgetfulness and stuttering problems.  Currently she states that her symptoms are worse since March when she stopped Artane. She is having trouble with writing and balance.  Her sleep is fine and has not had any falls. Her gambling problem is improved according to her, but still compulsive 1/week.     ROS:  Review of Systems   Constitutional:  Negative for fever, malaise/fatigue and weight loss.   HENT:  Negative for congestion and hearing loss.    Eyes:  Negative for blurred vision and double vision.   Respiratory:  Negative for cough, shortness of breath and stridor.    Cardiovascular:  Negative for chest pain, palpitations and leg swelling.   Gastrointestinal:  Negative for constipation, nausea and vomiting.   Genitourinary:  Negative for dysuria, frequency and urgency.   Musculoskeletal:  Positive for falls.  Negative for myalgias.   Skin:  Negative for rash.   Neurological:  Positive for tremors. Negative for speech change, focal weakness, seizures and headaches.   Endo/Heme/Allergies:  Does not bruise/bleed easily.   Psychiatric/Behavioral:  Negative for depression, hallucinations and memory loss. The patient is not nervous/anxious.      Past Medical History: The patient  has a past medical history of Anxiety, Hypertension, Mitral valve prolapse syndrome, Other chronic sinusitis, Parkinson disease (01/01/2004), PONV (postoperative nausea and vomiting), and Post-menopause (2011).    Social History: The patient  reports that she has never smoked. She has never used smokeless tobacco. She reports current alcohol use of about 1.0 standard drink of alcohol per week. She reports that she does not use drugs.    Family History: Their family history includes Brain Hemorrhage in her sister; Coronary artery disease in her father; Diabetes in her brother, father, and mother; Hypertension in her brother, brother, and mother; Neuropathy in her mother.    Allergies: Adhesive tape-silicones     Meds:   Current Outpatient Medications on File Prior to Visit   Medication Sig Dispense Refill    ALPRAZolam (XANAX) 0.25 MG tablet Take 1 tablet (0.25 mg total) by mouth 2 (two) times daily as needed for Anxiety. 20 tablet 0    amantadine  mg Tab Half a tab BID 60 tablet 11    EScitalopram oxalate (LEXAPRO) 10 MG tablet Take 1 tablet (10 mg total) by mouth once daily. 90 tablet 3    estradioL (ESTRACE) 0.01 % (0.1 mg/gram) vaginal cream Place 1 g vaginally 3 (three) times a week. Place by fingertip application before bedtime three times a week (Monday, Wednesday, Friday) 45 g 3    mirabegron (MYRBETRIQ) 50 mg Tb24 Take 1 tablet (50 mg total) by mouth once daily. 30 tablet 11    nebivoloL (BYSTOLIC) 2.5 MG Tab Take 1 tablet (2.5 mg total) by mouth once daily. 90 tablet 3    ondansetron (ZOFRAN-ODT) 8 MG TbDL Take 4 mg by mouth every 6  "(six) hours as needed.      rasagiline (AZILECT) 1 mg Tab TAKE 1 TABLET BY MOUTH EVERY DAY 90 tablet 3    RYTARY 36. mg CpSR TAKE 1 CAPSULE BY MOUTH THREE TIMES A  capsule 2     No current facility-administered medications on file prior to visit.     Exam:  /77   Pulse 67   Ht 5' 6" (1.676 m)   Wt 67.1 kg (147 lb 14.9 oz)   LMP 12/28/2011 Comment:   2011  BMI 23.88 kg/m²       MOTOR EXAMINATION ON       Speech  0 - Normal.   Facial Expression  1 - Minimal Hypomimia, could be normal "Poker Face".   Tremor at Rest:      Face, lips, chin 0 - Absent.    Hands:      right 2- Moderate in amplitude and present most of the time.   left 1 - Mild in amplitude and persistent. Or moderate in amplitude, but only intermittently present.    Feet:      right 0 - Absent.    left 0 - Absent.    Action or Postural Tremor of Hands      right 1 - Slight; present with action.   left 1 - Slight; present with action.                           Finger Taps      right 2 - Severely impaired. Frequent hesitation in initiating movements or arrests in ongoing movement.   left 2 - Moderately impaired. Definite and early fatiguing. May have occasional arrests in movement.   Hand Movements      right 2 - Moderately impaired. Definite and early fatiguing. May have occasional arrests in movement.   left 1 - Mild slowing and/or reduction in amplitude.   Rapid Alternating Movements of Hands      right 2 - Severely impaired. Frequent hesitation in initiating movements or arrests in ongoing movement.   left 1- Moderately impaired. Definite and early fatiguing. May have occasional arrests in movement.   Leg Agility      right 3 - Severely impaired. Frequent hesitation in initiating movements or arrests in ongoing movement.   left 1 - Moderately impaired. Definite and early fatiguing. May have occasional arrests in movement.   Arising from Chair  1 - Slow; or may need more than one attempt.   Posture  1 - Not quite erect, slightly " stooped posture; could be normal for older person.   Gait  1 - Walks slowly, may shuffle with short steps, but no festination (hastening steps) or propulsion.       Body Bradykinesia and Hypokinesia (Combining slowness, hesitancy, decreased armswing, small amplitude, and poverty of movement in general)  1 - Minimal slowness, giving movement a deliberate character; could be normal for some persons. Possibly reduced amplitude.     DBS MRI Compatibility INFO  07/25/2025      IPG Model(s) BS Genus R16    Serial No. [id number]   Impedance NL   Battery Fully charged - rechargeable   Pocket Adapter  None     _______________________________________    Initial    FINAL                    Edited Program 4  Increased PW and increased mA  R hand MUCH more tremor control and both leg and hand less bradykinesia    Made program 1 to avoid possible FOG       Program 4- higher PW and higher mA - BEST                          -------prior---------            Programming  Ilumina groups 3 and 4  No worsening to FOG  Hand taps better  1-old  2-older    FINAL                  ----------  PRIOR    LEFT GPI 90/130  C&3 @2.0mA no imoprovement  C&2 @2.0mA better walking  C&1 @1.0mA better hand tapping  C&0 @0.7mA phosphenes    Initial    Program 2    Program 1          FINAL  1- new  2- fallback  3- new2  4- new3 - like 2 but tricia foot tapping            FINAL  Program 3 - blocked low to avoid phosphes    Program 2    Program 1          Final settings:      Laboratory/Radiological:  - Results:  - Independent review of images: MRI normal 2011    Problem List Items Addressed This Visit          Neuro    PD (Parkinson's disease) - Primary (Chronic)    Overview   neruop managing dr abigail interiano current . Will be having deep brain stimulation procedure with dr miriam carver 9/26/23 .          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

## 2025-07-25 NOTE — TELEPHONE ENCOUNTER
Called patient to assk her if she can come in 20 minutes earlier as requested by Dr. Rao via [employer] teams message as Dr. Rao states she needs more time with the patient. Patient confirmed she will be here at 2:20 instead of 2:40.

## 2025-08-04 ENCOUNTER — TELEPHONE (OUTPATIENT)
Dept: PRIMARY CARE CLINIC | Facility: CLINIC | Age: 60
End: 2025-08-04
Payer: MEDICARE

## 2025-08-04 ENCOUNTER — PATIENT MESSAGE (OUTPATIENT)
Dept: PRIMARY CARE CLINIC | Facility: CLINIC | Age: 60
End: 2025-08-04
Payer: MEDICARE

## 2025-08-04 NOTE — TELEPHONE ENCOUNTER
Copied from CRM #[id number]. Topic: Appointments - Appointment Access  >> Aug 4, 2025 11:11 AM Mary wrote:  Type:  Sooner Apoointment Request    Caller is requesting a sooner appointment.  Caller declined first available appointment listed below.  Caller will not accept being placed on the waitlist and is requesting a message be sent to doctor.  Name of Caller:Kusum Navarro    When is the first available appointment?ASAP     Would the patient rather a call back or a response via [facility]? Call back     Best Call Back Number:[phone]    Additional Information: Pt would like a call back from nurse regarding appt

## 2025-08-04 NOTE — TELEPHONE ENCOUNTER
Patient just scheduled an appointment on 10/24 regarding this message. She wants to know if any labs need to be ordered prior to her appointment. This was the message sent to her on 6/6:   Andres DECKER,      We hope this message finds you well. After reviewing your health records, we have identified that you are at an increased risk for cardiovascular events due to aortic atherosclerosis. Your health and well-being are our top priorities, and your primary care provider is committed to implementing preventive measures to keep you healthy and thriving.     Based on your current health status, we believe that you may benefit from starting a statin medication. Statins have been shown to significantly reduce the risk of heart attacks and strokes by managing cholesterol levels and improving overall cardiovascular health.      Please read the information below for a more detailed understanding of how statins can support your heart health. If you have any questions or would like to discuss this further, please do not hesitate to contact us.     What Is Aortic Atherosclerosis?     Atherosclerosis occurs when fat, cholesterol, and other substances form plaque in the walls of your aorta - the largest artery in your body. This can significantly increase the risk of cardiovascular problems, including heart attacks and strokes.     How Does Atherosclerosis Increase Risk?      Plaque Build-Up: Over time, plaque can build up in the walls of the aorta, narrowing the artery and reducing blood flow to your organs and other parts of your body.   Blood Clots: Plaques may rupture and form dangerous blood clots that can block arteries, potentially causing heart attacks or strokes.   Reduced Elasticity: Over time, the aorta becomes less flexible, increasing blood pressure and putting stress on your cardiovascular system.      Why Are Statins Recommended?      Given the increased risk for heart attacks and strokes, your doctor may  "recommend a statin medication. Statins can help manage cholesterol levels and reduce cardiovascular risk.      How Do Statins Work?      Lower LDL Cholesterol: Statins primarily lower low-density lipoprotein (LDL) cholesterol, often referred to as "bad" cholesterol. High levels of LDL can lead to plaque buildup in the arteries.   Reduce Inflammation: Statins have anti-inflammatory properties that can help stabilize plaques and reduce the risk of them rupturing.   Improve Endothelial Function: They help improve the function of the blood vessel lining, which can enhance blood flow and reduce blood pressure.   Prevent Blood Clots: Statins may also reduce the risk of blood clots, further lowering the risk of heart attacks and strokes.      Benefits of Statins      Reduced Risk of Heart Attack: By lowering LDL cholesterol and stabilizing plaques, statins decrease the likelihood of a heart attack.   Lowered Stroke Risk: Statins help ensure that blood flows smoothly through the arteries, reducing the chance of a clot causing a stroke.   Overall Cardiovascular Health: Statins contribute to better overall heart health, which is crucial for those with aortic atheroscleros    "

## 2025-08-07 NOTE — PROGRESS NOTES
Chief Complaint   Patient presents with    Consult     Dexa Consult        History of Present Illness: Tika Navarro is a 59 y.o. female that presents today 8/7/2025 for a bone density consultation. Recent DEXA scan with noted osteoporosis. No current use of Vitamin D. She is very active. No major family history of atraumatic fracture.    Chief Complaint   Patient presents with    Consult     Dexa Consult        Bone Density Results:   Results for orders placed during the hospital encounter of 02/28/25    DXA Bone Density Axial Skeleton 1 or more sites    Narrative  EXAMINATION:  DXA BONE DENSITY AXIAL SKELETON 1 OR MORE SITES    CLINICAL HISTORY:  Age-related osteoporosis without current pathological fracture.  Patient reported history of right hip fracture at age 53.  No reported treatments for osteoporosis.    TECHNIQUE:  DXA specification: Teepix A ([id number])    Bone Mineral Density scanning was performed over the hip and lumbar spine.    Review of the images confirms satisfactory positioning and technique.    COMPARISON:  No prior comparison available    FINDINGS:  Lumbar spine (L1-L4):               T-score is -0.7, and Z-score is +0.8.    Femoral neck:                          T-score is -2.6, and Z-score is -1.3.    Total hip:                                T-score is -2.0, and Z-score is -1.0.      Fracture Risk (FRAX)    20% risk of a major osteoporotic fracture in the next 10 years.    4.1% risk of hip fracture in the next 10 years.    Impression  *Osteoporosis based on T-score below -2.5  *Fracture risk is high.    RECOMMENDATIONS:  *Daily calcium intake 3007-8696 mg, dietary sources preferred; Vitamin D 6604-7961 IU daily.  *Weight bearing exercise and fall precautions.  *Recommend medical therapy for osteoporosis with high risk for fracture. Consider bisphosphonates (alendronate, risedronate, zoledronic acid), or denosumab.  *Repeat BMD in 2 years.      Electronically signed  "by: Kareem Velasquez  Date:    03/13/2025  Time:    10:22        BMD results/FRAX (10yr probability fracture)                    T     spine          T     hip          T     femoral neck          FRAX Major (20%)          FRAX Hip      (3%)               LABS:  Last Calcium   Lab Results   Component Value Date    CALCIUM 9.1 01/01/2025       Last Vitamin D No results found for: "[id number]"          Past Medical History:   Diagnosis Date    Anxiety     Hypertension     Mitral valve prolapse syndrome     Other chronic sinusitis     Parkinson disease 01/01/2004    Right tremor type    PONV (postoperative nausea and vomiting)     Post-menopause 2011       Past Surgical History:   Procedure Laterality Date    BRAIN SURGERY  10/2023    DBS for Parkinsons    BREAST SURGERY      DEEP BRAIN STIMULATOR PLACEMENT Left 09/26/2023    Procedure: INSERTION, DEEP BRAIN STIMULATOR;  Surgeon: Chuck Conti MD;  Location: [facility] OR 2ND FLR;  Service: Neurosurgery;  Laterality: Left;  INSERTION OF ELECTRODE FOR DEEP BRAIN STIMULATION/ RIGHT GLOBUS PALLIDUS INTERNUS/ TEDS & SCD/ [employer].    FRACTURE SURGERY  1/1/2018    Broken hip    INSERTION OF DEEP BRAIN STIMULATOR GENERATOR Left 10/03/2023    Procedure: INSERTION, PULSE GENERATOR, DEEP BRAIN STIMULATOR;  Surgeon: Chuck Conti MD;  Location: [facility] OR 2ND FLR;  Service: Neurosurgery;  Laterality: Left;  PLACEMENT OF PULSE GENERATOR FOR DEEP BRAIN STIMULATION/ LEFT SIDE/TEDS & SCD/[employer].    JOINT REPLACEMENT  1/1/2018    Hip    PLACEMENT OF FIDUCIAL SCREW INTO SPINE N/A 09/26/2023    Procedure: INSERTION, FIDUCIAL SCREW, SKULL;  Surgeon: Chuck Conti MD;  Location: [facility] OR 2ND FLR;  Service: Neurosurgery;  Laterality: N/A;  APPLICATION OF FIDUCIALS FOR DEEP BRAIN STIMULATION/ TEDS & SCD/[employer]    TONSILLECTOMY, ADENOIDECTOMY         Current Medications[1]    Review of patient's allergies indicates:   Allergen Reactions    Adhesive " tape-silicones Blisters, Itching, Rash and Swelling       Family History   Problem Relation Name Age of Onset    Coronary artery disease Father Mack     Diabetes Father Mack     Hypertension Mother Lauryn     Neuropathy Mother Lauryn     Diabetes Mother Lauryn     Diabetes Brother      Hypertension Brother      Hypertension Brother      Brain Hemorrhage Sister      Parkinsonism Neg Hx      Breast cancer Neg Hx      Ovarian cancer Neg Hx         Social History[2]    OB History    Para Term  AB Living   2    0 2   SAB IAB Ectopic Multiple Live Births       2      # Outcome Date GA Lbr [place]/2nd Weight Sex Type Anes PTL Lv   2  10/03/85    M Vag-Spont   KYLAH   1  81    M Vag-Spont   KYLAH      Complications: Placenta Previa       Review of Symptoms:  GENERAL: Denies weight gain or weight loss. Feeling well overall.   SKIN: Denies rash or lesions.   HEAD: Denies head injury or headache.   NODES: Denies enlarged lymph nodes.   CHEST: Denies chest pain or shortness of breath.   CARDIOVASCULAR: Denies palpitations or left sided chest pain.   ABDOMEN: No abdominal pain, constipation, diarrhea, nausea, vomiting or rectal bleeding.   URINARY: No frequency, dysuria, hematuria, or burning on urination.  HEMATOLOGIC: No easy bruisability or excessive bleeding.   MUSCULOSKELETAL: Denies joint pain or swelling.     /68   Wt 66 kg (145 lb 8.1 oz)   LMP 2011   Physical Exam:  APPEARANCE: Well nourished, well developed, in no acute distress.  SKIN: Normal skin turgor, no lesions.  NECK: Neck symmetric without masses   RESPIRATORY: Normal respiratory effort with no retractions or use of accessory muscles  EXTREMITIES: No clubbing cyanosis or edema.    ASSESSMENT/PLAN:  Age-related osteoporosis without current pathological fracture           Risk factors in bold   Race: White  Female   Body mass index is 23.48 kg/m².  Postmenopausal   History for fractures? Parental fractures or  osteoporosis?    Exercise?  Steroids? Anticonvulsants? Chemo?   Smoker ?   Alochol/ Caffeine intake?   Diet/supplements?         Recommendations  Limit caffeine and alcohol intake   Nutrition/foods high in calcium and vit D (handout provided)  Vit D  supplements.   Adequate protein (to reduce potential for fractures and promote fracture healing in older adults)  No smoking/avoid second hand smoke   Weight bearing, resistance exercise and aerobics - spine  Walking - hip       Recommended use of Fosamax, discussed benefit/risk, side effects. Plans to research further and notify.       RUDDY Heath             [1]   Current Outpatient Medications   Medication Sig Dispense Refill    ALPRAZolam (XANAX) 0.25 MG tablet Take 1 tablet (0.25 mg total) by mouth 2 (two) times daily as needed for Anxiety. 20 tablet 0    amantadine  mg Tab Half a tab BID 60 tablet 11    estradioL (ESTRACE) 0.01 % (0.1 mg/gram) vaginal cream Place 1 g vaginally 3 (three) times a week. Place by fingertip application before bedtime three times a week (Monday, Wednesday, Friday) 45 g 3    nebivoloL (BYSTOLIC) 2.5 MG Tab Take 1 tablet (2.5 mg total) by mouth once daily. 90 tablet 3    ondansetron (ZOFRAN-ODT) 8 MG TbDL Take 4 mg by mouth every 6 (six) hours as needed.      EScitalopram oxalate (LEXAPRO) 10 MG tablet Take 1 tablet (10 mg total) by mouth once daily. 90 tablet 3    mirabegron (MYRBETRIQ) 50 mg Tb24 Take 1 tablet (50 mg total) by mouth once daily. 30 tablet 11    rasagiline (AZILECT) 1 mg Tab TAKE 1 TABLET BY MOUTH EVERY DAY 90 tablet 3    RYTARY 36. mg CpSR TAKE 1 CAPSULE BY MOUTH THREE TIMES A  capsule 2     No current facility-administered medications for this visit.   [2]   Social History  Tobacco Use    Smoking status: Never    Smokeless tobacco: Never   Substance Use Topics    Alcohol use: Yes     Alcohol/week: 1.0 standard drink of alcohol     Types: 1 Glasses of wine per week    Drug use: Never

## 2025-08-21 ENCOUNTER — TELEPHONE (OUTPATIENT)
Facility: CLINIC | Age: 60
End: 2025-08-21
Payer: MEDICARE

## 2025-08-29 ENCOUNTER — TELEPHONE (OUTPATIENT)
Dept: ENDOSCOPY | Facility: HOSPITAL | Age: 60
End: 2025-08-29
Payer: MEDICARE

## 2025-08-29 DIAGNOSIS — Z12.11 SCREEN FOR COLON CANCER: Primary | ICD-10-CM

## 2025-09-02 ENCOUNTER — TELEPHONE (OUTPATIENT)
Dept: ENDOSCOPY | Facility: HOSPITAL | Age: 60
End: 2025-09-02
Payer: MEDICARE

## 2025-09-04 ENCOUNTER — TELEPHONE (OUTPATIENT)
Dept: ENDOSCOPY | Facility: HOSPITAL | Age: 60
End: 2025-09-04
Payer: MEDICARE

## (undated) DEVICE — ADHESIVE MASTISOL VIAL 48/BX

## (undated) DEVICE — Device

## (undated) DEVICE — CHARGING SYSTEM

## (undated) DEVICE — RUBBERBAND STERILE 3X1/8IN

## (undated) DEVICE — SUT 2-0 12-18IN SILK

## (undated) DEVICE — BILATERAL NEXFRAME KIT

## (undated) DEVICE — BIT DRILL PERFORATOR DISP 14MM

## (undated) DEVICE — BANDAGE ROLL COTTN 4.5INX4.1YD

## (undated) DEVICE — SUT 2/0 18IN SILK BLK BRAID

## (undated) DEVICE — BLADE PEAK PLASMA

## (undated) DEVICE — TAPE MEDIPORE 4IN X 2YDS

## (undated) DEVICE — DRAPE CORETEMP FLD WRM 56X62IN

## (undated) DEVICE — GUIDE ENDOCAVITY NEEDLE 3.5X20

## (undated) DEVICE — DIFFUSER

## (undated) DEVICE — SUT SILK 3-0 CR/RB-1 8-18

## (undated) DEVICE — BIT PERFORATOR LARGE

## (undated) DEVICE — DRAPE IOBAN 2 STERI

## (undated) DEVICE — STAPLER SKIN PROXIMATE WIDE

## (undated) DEVICE — CLOSURE SKIN STERI STRIP 1/2X4

## (undated) DEVICE — DRESSING SURGICAL 1/2X1/2

## (undated) DEVICE — DRAPE INCISE IOBAN 2 23X17IN

## (undated) DEVICE — STERILE CANNULA

## (undated) DEVICE — DRAPE T THYROID STERILE

## (undated) DEVICE — ELECTRODE REM PLYHSV RETURN 9

## (undated) DEVICE — CARTRIDGE OIL

## (undated) DEVICE — HEMOSTAT SURGICEL 4X8IN

## (undated) DEVICE — TRAY NEURO OMC

## (undated) DEVICE — DRESSING TELFA N ADH 3X8

## (undated) DEVICE — NDL 18GA X1 1/2 REG BEVEL

## (undated) DEVICE — 35CM LONG TUNNELING TOOL

## (undated) DEVICE — DRAPE STERI INSTRUMENT 1018

## (undated) DEVICE — DRESSING TEGADERM 4.4X5IN

## (undated) DEVICE — BOOT SUTURE AID

## (undated) DEVICE — SPONGE DERMACEA GAUZE 4X4

## (undated) DEVICE — SUT MONOCRYL 4-0 PS-1 UND

## (undated) DEVICE — TUBE FRAZIER 5MM 2FT SOFT TIP

## (undated) DEVICE — CORD BIPOLAR 12 FOOT

## (undated) DEVICE — REMOTE CONTROL KIT

## (undated) DEVICE — SPONGE COTTON TRAY 4X4IN

## (undated) DEVICE — DRAPE THREE-QTR REINF 53X77IN

## (undated) DEVICE — MARKER SKIN STND TIP BLUE BARR

## (undated) DEVICE — CABLE NEUR OMEGA HEADSTAGE 5CH

## (undated) DEVICE — SUT VICRYL PLUS 3-0 SH 18IN

## (undated) DEVICE — TRAY CATH FOL SIL URIMTR 16FR

## (undated) DEVICE — STRIP MEDI WND CLSR 1/2X4IN

## (undated) DEVICE — SPONGE GAUZE 16PLY 4X4

## (undated) DEVICE — BUR BONE CUT MICRO TPS 3X3.8MM

## (undated) DEVICE — SUT VICRYL PLUS 4-0 P3 18IN

## (undated) DEVICE — FIDUCIAL KIT UNI STEREO 10 MM
Type: IMPLANTABLE DEVICE | Site: CRANIAL | Status: NON-FUNCTIONAL
Removed: 2023-09-26

## (undated) DEVICE — SUT CTD VICRYL CR/RB-1 4-0